# Patient Record
Sex: MALE | Race: WHITE | NOT HISPANIC OR LATINO | Employment: OTHER | URBAN - METROPOLITAN AREA
[De-identification: names, ages, dates, MRNs, and addresses within clinical notes are randomized per-mention and may not be internally consistent; named-entity substitution may affect disease eponyms.]

---

## 2018-04-24 ENCOUNTER — OFFICE VISIT (OUTPATIENT)
Dept: HEMATOLOGY | Facility: CLINIC | Age: 68
End: 2018-04-24
Attending: INTERNAL MEDICINE
Payer: COMMERCIAL

## 2018-04-24 VITALS
SYSTOLIC BLOOD PRESSURE: 133 MMHG | WEIGHT: 153.9 LBS | BODY MASS INDEX: 22.8 KG/M2 | HEART RATE: 81 BPM | RESPIRATION RATE: 12 BRPM | OXYGEN SATURATION: 96 % | HEIGHT: 69 IN | DIASTOLIC BLOOD PRESSURE: 81 MMHG | TEMPERATURE: 97.8 F

## 2018-04-24 DIAGNOSIS — Z86.718 PERSONAL HISTORY OF VENOUS THROMBOSIS AND EMBOLISM: ICD-10-CM

## 2018-04-24 DIAGNOSIS — D47.1 CHRONIC MYELOPROLIFERATIVE DISORDER (H): Primary | ICD-10-CM

## 2018-04-24 LAB
ALBUMIN SERPL-MCNC: 3.5 G/DL (ref 3.4–5)
ALP SERPL-CCNC: 145 U/L (ref 40–150)
ALT SERPL W P-5'-P-CCNC: 16 U/L (ref 0–70)
ANION GAP SERPL CALCULATED.3IONS-SCNC: 11 MMOL/L (ref 3–14)
AST SERPL W P-5'-P-CCNC: 23 U/L (ref 0–45)
BASOPHILS # BLD AUTO: 0.3 10E9/L (ref 0–0.2)
BASOPHILS NFR BLD AUTO: 0.9 %
BILIRUB SERPL-MCNC: 0.8 MG/DL (ref 0.2–1.3)
BUN SERPL-MCNC: 20 MG/DL (ref 7–30)
CALCIUM SERPL-MCNC: 8.6 MG/DL (ref 8.5–10.1)
CHLORIDE SERPL-SCNC: 105 MMOL/L (ref 94–109)
CO2 SERPL-SCNC: 22 MMOL/L (ref 20–32)
CREAT SERPL-MCNC: 1.11 MG/DL (ref 0.66–1.25)
DIFFERENTIAL METHOD BLD: ABNORMAL
EOSINOPHIL # BLD AUTO: 0.5 10E9/L (ref 0–0.7)
EOSINOPHIL NFR BLD AUTO: 1.4 %
ERYTHROCYTE [DISTWIDTH] IN BLOOD BY AUTOMATED COUNT: 19.8 % (ref 10–15)
GFR SERPL CREATININE-BSD FRML MDRD: 66 ML/MIN/1.7M2
GLUCOSE SERPL-MCNC: 83 MG/DL (ref 70–99)
HCT VFR BLD AUTO: 57.8 % (ref 40–53)
HGB BLD-MCNC: 17.9 G/DL (ref 13.3–17.7)
IMM GRANULOCYTES # BLD: 0.3 10E9/L (ref 0–0.4)
IMM GRANULOCYTES NFR BLD: 0.8 %
LYMPHOCYTES # BLD AUTO: 1.8 10E9/L (ref 0.8–5.3)
LYMPHOCYTES NFR BLD AUTO: 5.1 %
MCH RBC QN AUTO: 22.6 PG (ref 26.5–33)
MCHC RBC AUTO-ENTMCNC: 31 G/DL (ref 31.5–36.5)
MCV RBC AUTO: 73 FL (ref 78–100)
MONOCYTES # BLD AUTO: 0.6 10E9/L (ref 0–1.3)
MONOCYTES NFR BLD AUTO: 1.7 %
NEUTROPHILS # BLD AUTO: 31.4 10E9/L (ref 1.6–8.3)
NEUTROPHILS NFR BLD AUTO: 90.1 %
NRBC # BLD AUTO: 0.7 10*3/UL
NRBC BLD AUTO-RTO: 2 /100
PLATELET # BLD AUTO: 704 10E9/L (ref 150–450)
POTASSIUM SERPL-SCNC: 5 MMOL/L (ref 3.4–5.3)
PROT SERPL-MCNC: 8.5 G/DL (ref 6.8–8.8)
RBC # BLD AUTO: 7.91 10E12/L (ref 4.4–5.9)
SODIUM SERPL-SCNC: 138 MMOL/L (ref 133–144)
WBC # BLD AUTO: 34.9 10E9/L (ref 4–11)

## 2018-04-24 PROCEDURE — 85025 COMPLETE CBC W/AUTO DIFF WBC: CPT | Performed by: INTERNAL MEDICINE

## 2018-04-24 PROCEDURE — 99203 OFFICE O/P NEW LOW 30 MIN: CPT | Performed by: INTERNAL MEDICINE

## 2018-04-24 PROCEDURE — 80053 COMPREHEN METABOLIC PANEL: CPT | Performed by: INTERNAL MEDICINE

## 2018-04-24 PROCEDURE — G0463 HOSPITAL OUTPT CLINIC VISIT: HCPCS

## 2018-04-24 ASSESSMENT — PAIN SCALES - GENERAL: PAINLEVEL: NO PAIN (0)

## 2018-04-24 NOTE — MR AVS SNAPSHOT
After Visit Summary   4/24/2018    Jose Luis Parker    MRN: [id number]           Patient Information     Date Of Birth          1950        Visit Information        Provider Department      4/24/2018 1:30 PM Kwan Esposito MD [facility]        Today's Diagnoses     Chronic myeloproliferative disorder (H)    -  1    Personal history of venous thrombosis and embolism          Care Instructions    1. Continue anticoagulation with Eliquis.  2. Return to clinic in one month.                            [facility] [phone]    What is Apixaban (Eliquis  )? Eliquis   is a prescription medicine used to treat deep vein thrombosis (DVT) and pulmonary embolism, and to help reduce the risk of these conditions reoccurring.  It is also used to prevent clotting after knee & hip replacement.  It is an oral factor Xa inhibitor (it stops one of the clotting proteins). Your diet (or Vitamin K intake) does not affect this medication. This drug was FDA approved to treat DVT in 2014, but has been used in [place] since 2012 for atrial fibrillation.    How is Eliquis  usually dosed? For treatment for a new clot, Eliquis   is dosed 10mg twice daily for 7 days.  After that you take 5 mg twice daily. The dosing to prevent clots in your veins after surgery, long flights, or other high risk times: 2.5 mg twice daily  How do I know if I am taking the right dose? Everyone takes the same dose, no matter their weight.  It was not studied in obese patients.  Currently there is no lab test to check the Eliquis   blood level.  However, such testing is being developed and will likely be available in the future.  What are the possible side effects of Eliquis  ? The most common side effect of Eliquis   is bleeding (i.e. bleeding from gums, nosebleeds, heavier than normal menstrual bleeding, blood in urine or stool). This risk of bleeding is similar to other oral  blood thinners.    Get emergency medical help if you have any of these signs of allergic reaction: hives; difficulty breathing; swelling of your face, lips, tongue, throat; or if you should experience any significant bleeding.   Other side effects include: headaches, feeling dizzy or weak  What if I have surgery or a procedure scheduled? Please call your doctor about holding this medication prior to surgeries, injections into your joints or spine, or other invasive procedures (i.e. endoscopy/colonoscopy if biopsy or polyp removal needed).  Please notify your dentist that you are taking a blood thinner, although certain procedures may not require holding your Eliquis    Should I be worried that there is no reversal agent? There is no reversal agent for Eliquis   (i.e. like plasma or Vitamin K for Coumadin) However, it wears off fairly quickly. If emergent surgery is required or life-threatening bleeding should occur, blood products and medications that promote clotting may be given. Please get examined for potential bleeding after any trauma or head injury.  What should I tell my doctor before starting Eliquis  ? Please tell your doctor about past bleeding problems, liver or kidney problems, if you are pregnant or breastfeeding, or taking the following medications: mifepristone, certain antidepressants (including SSRIs such as fluoxetine, SNRIs such as venlafaxine), certain azole antifungals (such as itraconazole, ketoconazole, posaconazole), clarithromycin, conivaptan, HIV protease inhibitors (such as lopinavir, ritonavir), rifamycins (such as rifabutin), Lazara's wort, or drugs used to treat seizures (such as carbamazepine, phenytoin).            Follow-ups after your visit        Follow-up notes from your care team     Return in about 4 weeks (around 5/22/2018).      Who to contact     If you have questions or need follow up information about today's clinic visit or your schedule please contact [facility] directly at [phone].  Normal or non-critical lab and imaging results will be communicated to you by MyChart, letter or phone within 4 business days after the clinic has received the results. If you do not hear from us within 7 days, please contact the clinic through [facility] or phone. If you have a critical or abnormal lab result, we will notify you by phone as soon as possible.  Submit refill requests through [employer] or call your pharmacy and they will forward the refill request to us. Please allow 3 business days for your refill to be completed.          Additional Information About Your Visit        [facility] Information     [employer] lets you send messages to your doctor, view your test results, renew your prescriptions, schedule appointments and more. To sign up, go to [email or web address] . Click on \"Log in\" on the left side of the screen, which will take you to the Welcome page. Then click on \"Sign up Now\" on the right side of the page.     You will be asked to enter the access code listed below, as well as some personal information. Please follow the directions to create your username and password.     Your access code is: [email or web address]  Expires: 2018  3:27 PM     Your access code will  in 90 days. If you need help or a new code, please call your [facility] clinic or [phone].        Care EveryWhere ID     This is your Care EveryWhere ID. This could be used by other organizations to access your [facility] medical records  [id number]        Your Vitals Were     Pulse Temperature Respirations Height Pulse Oximetry BMI (Body Mass Index)    81 97.8  F (36.6  C) (Oral) 12 1.745 m (5' 8.7\") 96% 22.93 kg/m2       Blood Pressure from Last 3 Encounters:   18 133/81    Weight from Last 3 Encounters:   18 69.8 kg (153 lb 14.4 oz)               Primary Care Provider Office Phone # Fax #    Vivek Tamia [phone]       [facility] [address] [facility] [place] [address]        Equal Access to Services     PHYLLISANUPAMA REA : Hadii aris Hunter, wajonathan skelton, dongjessica garridomalitzy dudley, krissy ramirezmichelleliudmila proctor. So [facility] [phone].    ATENCIÓN: Si habla español, tiene a gonzales disposición servicios gratuitos de asistencia lingüística. Llame al [phone].    We comply with applicable federal civil rights laws and [place] laws. We do not discriminate on the basis of race, color, national origin, age, disability, sex, sexual orientation, or gender identity.            Thank you!     Thank you for choosing [facility]  for your care. Our goal is always to provide you with excellent care. Hearing back from our patients is one way we can continue to improve our services. Please take a few minutes to complete the written survey that you may receive in the mail after your visit with us. Thank you!             Your Updated Medication List - Protect others around you: Learn how to safely use, store and throw away your medicines at www.disposemymeds.org.          This list is accurate as of 4/24/18  3:29 PM.  Always use your most recent med list.                   Brand Name Dispense Instructions for use Diagnosis    ELIQUIS PO      Take 5 mg by mouth every 12 hours    Chronic myeloproliferative disorder (H), Personal history of venous thrombosis and embolism

## 2018-04-24 NOTE — PROGRESS NOTES
[facility]  Outpatient Clinic Visit    Jose Luis Parker is a 67-year-old male who is here today to establish care.  He is accompanied by his wife.  Coincidentally, I previously cared for her father at the [facility] many years ago.      Jose Luis presented in late 12/2017 with a left lower extremity deep vein thrombosis and bilateral pulmonary embolism.  This was diagnosed about a week after he arrived in [place] for the winter.  They did drive there so initially the thought was that this clot was provoked by a long travel.  However, upon further investigation, he was noted to have elevated blood counts (white blood cell count, hemoglobin and platelets were all elevated) along with mild hepatosplenomegaly.  This led to further investigation including a bone marrow biopsy, which was diagnostic of a chronic myeloproliferative neoplasm.  He is JAK2 positive.  No other mutations were identified.  Cytogenetics and flow cytometry were normal.      He was started on anticoagulation with apixaban and also placed on hydroxyurea.  Because they only spend the winter months in [place], they really did not have established medical care there.  There seemed to be some confusion and inconsistency with regard to the recommendations they were receiving.  Over the first few weeks, he struggled with intermittent episodes of pleuritic chest pain which he thought might be a side effect of hydroxyurea and so he eventually stopped taking it.  He has been off that now since sometime in late February (approximately 2 months ago).  Overall, he feels well.  He is tolerating the apixaban without any difficulty.  He feels he has returned to his full functional baseline.  He has no residual leg swelling or pain and no chest pain or shortness of breath.  He remains physically quite active.  He is having no bleeding issues.      They had blood counts checked in [place] about a month ago before they traveled  back to [place].  His white blood cell count was reportedly 22,000 with a platelet count of 417,000.  We do not know the hemoglobin.  He had been off hydroxyurea for about a month when these counts were obtained.      Otherwise, he has been quite healthy and has no chronic medical problems and previously had not been taking any medications.      PHYSICAL EXAMINATION:  He looks fit and in good overall health.  A detailed exam was not performed today due to time constraints.      LABORATORY DATA:  Pending at time of this dictation.        ASSESSMENT AND PLAN:   1.  Chronic myeloproliferative disorder.   2.  Left lower extremity deep vein thrombosis and bilateral pulmonary embolism in 12/2017.      I spent a total of 60 minutes today with Jose Luis and Tom, all of which was in counseling and coordination of care.  He presented with an episode of venous thromboembolism which was initially felt to be provoked by prolonged car travel.  However, in retrospect, he had a previously undiagnosed chronic myeloproliferative disorder which I explained is actually a much stronger risk factor for venous thrombosis than the long car trip.  We do not know how long he has had this chronic myeloproliferative disorder given that we do not have baseline labs.      I explained the natural history of chronic myeloproliferative disorders and the association with risk for thrombosis.  Given that he has this diagnosis and it is not curative (short of stem cell transplant), it will be imperative that he remain on anticoagulation long term.  In addition, I also explained the importance of making sure his blood counts are controlled.  I described use of Hydrea in this regard and tried my best to reassure him that it is almost always a very well tolerated and safe medication.  I do not believe the perceived side effects he was having were from the Hydrea but rather from evolution of his pulmonary emboli.  Again, all of that seems to have resolved  over the last few weeks.      Our plan is to obtain baseline labs today.  If his CBC is stable, we do not need to start Hydrea.  Either way, we will plan to see him back in 4 weeks for repeat labs and clinical evaluation.  In the meantime, he should remain on apixaban.  We did explain the importance of taking this at strict 12-hour dosing intervals.       Kwan Esposito MD  Associate Professor of Medicine  Division of Hematology, Oncology, and Transplantation  Director, [facility]

## 2018-04-24 NOTE — PATIENT INSTRUCTIONS
1. Continue anticoagulation with Eliquis.  2. Return to clinic in one month.                            [facility] [phone]    What is Apixaban (Eliquis  )? Eliquis   is a prescription medicine used to treat deep vein thrombosis (DVT) and pulmonary embolism, and to help reduce the risk of these conditions reoccurring.  It is also used to prevent clotting after knee & hip replacement.  It is an oral factor Xa inhibitor (it stops one of the clotting proteins). Your diet (or Vitamin K intake) does not affect this medication. This drug was FDA approved to treat DVT in 2014, but has been used in [place] since 2012 for atrial fibrillation.    How is Eliquis  usually dosed? For treatment for a new clot, Eliquis   is dosed 10mg twice daily for 7 days.  After that you take 5 mg twice daily. The dosing to prevent clots in your veins after surgery, long flights, or other high risk times: 2.5 mg twice daily  How do I know if I am taking the right dose? Everyone takes the same dose, no matter their weight.  It was not studied in obese patients.  Currently there is no lab test to check the Eliquis   blood level.  However, such testing is being developed and will likely be available in the future.  What are the possible side effects of Eliquis  ? The most common side effect of Eliquis   is bleeding (i.e. bleeding from gums, nosebleeds, heavier than normal menstrual bleeding, blood in urine or stool). This risk of bleeding is similar to other oral blood thinners.    Get emergency medical help if you have any of these signs of allergic reaction: hives; difficulty breathing; swelling of your face, lips, tongue, throat; or if you should experience any significant bleeding.   Other side effects include: headaches, feeling dizzy or weak  What if I have surgery or a procedure scheduled? Please call your doctor about holding this medication prior to surgeries, injections into your joints or spine, or other  invasive procedures (i.e. endoscopy/colonoscopy if biopsy or polyp removal needed).  Please notify your dentist that you are taking a blood thinner, although certain procedures may not require holding your Eliquis    Should I be worried that there is no reversal agent? There is no reversal agent for Eliquis   (i.e. like plasma or Vitamin K for Coumadin) However, it wears off fairly quickly. If emergent surgery is required or life-threatening bleeding should occur, blood products and medications that promote clotting may be given. Please get examined for potential bleeding after any trauma or head injury.  What should I tell my doctor before starting Eliquis  ? Please tell your doctor about past bleeding problems, liver or kidney problems, if you are pregnant or breastfeeding, or taking the following medications: mifepristone, certain antidepressants (including SSRIs such as fluoxetine, SNRIs such as venlafaxine), certain azole antifungals (such as itraconazole, ketoconazole, posaconazole), clarithromycin, conivaptan, HIV protease inhibitors (such as lopinavir, ritonavir), rifamycins (such as rifabutin), Kirkpatrick's wort, or drugs used to treat seizures (such as carbamazepine, phenytoin).

## 2018-04-25 ENCOUNTER — TELEPHONE (OUTPATIENT)
Dept: HEMATOLOGY | Facility: CLINIC | Age: 68
End: 2018-04-25

## 2018-04-25 DIAGNOSIS — D47.1 CHRONIC MYELOPROLIFERATIVE DISEASE (H): Primary | ICD-10-CM

## 2018-04-25 RX ORDER — HYDROXYUREA 500 MG/1
500 CAPSULE ORAL 2 TIMES DAILY
Status: DISCONTINUED | OUTPATIENT
Start: 2018-04-25 | End: 2018-07-19

## 2018-04-25 NOTE — TELEPHONE ENCOUNTER
After talking to Dr. Esposito about Mr. Torres's laboratory studies from yesterday (see below) I called him and requested that he begin hydroxyurea 500 mg BID.  He was in agreement with that plan and a prescription was sent to his pharmacy. We will check another CBC next Thursday   Results for JOHNATHAN TORRES (MRN [id number]) as of 4/25/2018 13:54   Ref. Range 4/24/2018 15:37   WBC Latest Ref Range: 4.0 - 11.0 10e9/L 34.9 (H)   Hemoglobin Latest Ref Range: 13.3 - 17.7 g/dL 17.9 (H)   Hematocrit Latest Ref Range: 40.0 - 53.0 % 57.8 (H)   Platelet Count Latest Ref Range: 150 - 450 10e9/L 704 (H)   RBC Count Latest Ref Range: 4.4 - 5.9 10e12/L 7.91 (H)   MCV Latest Ref Range: 78 - 100 fl 73 (L)   MCH Latest Ref Range: 26.5 - 33.0 pg 22.6 (L)   MCHC Latest Ref Range: 31.5 - 36.5 g/dL 31.0 (L)   RDW Latest Ref Range: 10.0 - 15.0 % 19.8 (H)   Diff Method Unknown Automated Method   % Neutrophils Latest Units: % 90.1   % Lymphocytes Latest Units: % 5.1   % Monocytes Latest Units: % 1.7   % Eosinophils Latest Units: % 1.4   % Basophils Latest Units: % 0.9   % Immature Granulocytes Latest Units: % 0.8   Nucleated RBCs Latest Ref Range: 0 /100 2 (H)   Absolute Neutrophil Latest Ref Range: 1.6 - 8.3 10e9/L 31.4 (H)   Absolute Lymphocytes Latest Ref Range: 0.8 - 5.3 10e9/L 1.8   Absolute Monocytes Latest Ref Range: 0.0 - 1.3 10e9/L 0.6   Absolute Eosinophils Latest Ref Range: 0.0 - 0.7 10e9/L 0.5   Absolute Basophils Latest Ref Range: 0.0 - 0.2 10e9/L 0.3 (H)   Abs Immature Granulocytes Latest Ref Range: 0 - 0.4 10e9/L 0.3   Results for JOHNATHAN TORRES (MRN [id number]) as of 4/25/2018 13:54   Ref. Range 4/24/2018 15:37   Sodium Latest Ref Range: 133 - 144 mmol/L 138   Potassium Latest Ref Range: 3.4 - 5.3 mmol/L 5.0   Chloride Latest Ref Range: 94 - 109 mmol/L 105   Carbon Dioxide Latest Ref Range: 20 - 32 mmol/L 22   Urea Nitrogen Latest Ref Range: 7 - 30 mg/dL 20   Creatinine Latest Ref Range: 0.66 - 1.25 mg/dL  1.11   GFR Estimate Latest Ref Range: >60 mL/min/1.7m2 66   GFR Estimate If Black Latest Ref Range: >60 mL/min/1.7m2 80   Calcium Latest Ref Range: 8.5 - 10.1 mg/dL 8.6   Anion Gap Latest Ref Range: 3 - 14 mmol/L 11   Albumin Latest Ref Range: 3.4 - 5.0 g/dL 3.5   Protein Total Latest Ref Range: 6.8 - 8.8 g/dL 8.5   Bilirubin Total Latest Ref Range: 0.2 - 1.3 mg/dL 0.8   Alkaline Phosphatase Latest Ref Range: 40 - 150 U/L 145   ALT Latest Ref Range: 0 - 70 U/L 16   AST Latest Ref Range: 0 - 45 U/L 23

## 2018-04-25 NOTE — NURSING NOTE
Patient with chronic myeloproliferative disorder and history of left LE DVT and pulmonary embolism (12/2017) here with his wife.  After discussion with Dr. Esposito it was decided that he would continue anticoagulation with abixaban and obtain current laboratory studies to evaluate the status of his myeloproliferative disorder.  VTE risk factors, signs, and symptoms discussed.  Abixaban reviewed.  Relationship between VTE and chronic myeloproliferative disorders discussed by Dr. Esposito.  Patient instructions reviewed.  Patient has contact information for the Center and was encouraged to call with questions or concerns.

## 2018-04-30 ENCOUNTER — TELEPHONE (OUTPATIENT)
Dept: HEMATOLOGY | Facility: CLINIC | Age: 68
End: 2018-04-30

## 2018-05-01 NOTE — TELEPHONE ENCOUNTER
During a dental check up Mr. Parker's dentist saw some possible oral thrush and prescribed a course of Nystatin. I explained that there was no direct relationship between his recent diagnosis of chronic myeloproliferative disorder and the possible thrush. I recommended that he use the nystatin as directed. He agreed to this plan.

## 2018-05-04 DIAGNOSIS — D47.1 CHRONIC MYELOPROLIFERATIVE DISORDER (H): ICD-10-CM

## 2018-05-04 DIAGNOSIS — D47.1 CHRONIC MYELOPROLIFERATIVE DISORDER (H): Primary | ICD-10-CM

## 2018-05-04 LAB
BASOPHILS # BLD AUTO: 0.2 10E9/L (ref 0–0.2)
BASOPHILS NFR BLD AUTO: 1 %
DIFFERENTIAL METHOD BLD: ABNORMAL
EOSINOPHIL # BLD AUTO: 0.4 10E9/L (ref 0–0.7)
EOSINOPHIL NFR BLD AUTO: 2 %
ERYTHROCYTE [DISTWIDTH] IN BLOOD BY AUTOMATED COUNT: 23.3 % (ref 10–15)
HCT VFR BLD AUTO: 55.2 % (ref 40–53)
HGB BLD-MCNC: 17.4 G/DL (ref 13.3–17.7)
LYMPHOCYTES # BLD AUTO: 1.7 10E9/L (ref 0.8–5.3)
LYMPHOCYTES NFR BLD AUTO: 9 %
MCH RBC QN AUTO: 22.6 PG (ref 26.5–33)
MCHC RBC AUTO-ENTMCNC: 31.5 G/DL (ref 31.5–36.5)
MCV RBC AUTO: 72 FL (ref 78–100)
MONOCYTES # BLD AUTO: 0.2 10E9/L (ref 0–1.3)
MONOCYTES NFR BLD AUTO: 1 %
NEUTROPHILS # BLD AUTO: 16.2 10E9/L (ref 1.6–8.3)
NEUTROPHILS NFR BLD AUTO: 87 %
PLATELET # BLD AUTO: 500 10E9/L (ref 150–450)
PLATELET # BLD EST: ABNORMAL 10*3/UL
POIKILOCYTOSIS BLD QL SMEAR: SLIGHT
RBC # BLD AUTO: 7.71 10E12/L (ref 4.4–5.9)
STOMATOCYTES BLD QL SMEAR: SLIGHT
VARIANT LYMPHS BLD QL SMEAR: PRESENT
WBC # BLD AUTO: 18.7 10E9/L (ref 4–11)

## 2018-05-04 PROCEDURE — 36415 COLL VENOUS BLD VENIPUNCTURE: CPT | Performed by: INTERNAL MEDICINE

## 2018-05-04 PROCEDURE — 85025 COMPLETE CBC W/AUTO DIFF WBC: CPT | Performed by: INTERNAL MEDICINE

## 2018-05-08 ENCOUNTER — TELEPHONE (OUTPATIENT)
Dept: HEMATOLOGY | Facility: CLINIC | Age: 68
End: 2018-05-08

## 2018-05-08 DIAGNOSIS — D47.1 CHRONIC MYELOPROLIFERATIVE DISORDER (H): Primary | ICD-10-CM

## 2018-05-08 NOTE — TELEPHONE ENCOUNTER
After speaking to Dr. Esposito I called Mr. Torres to review his CBC with differential which demonstrated improvement since he initiated the hydroxyurea (500 mg two times a day) on 4/25/2018.   Results for JOHNATHAN TORRES (MRN [id number]) as of 5/8/2018 05:54   Ref. Range 4/24/2018 15:37 5/4/2018 08:57   WBC Latest Ref Range: 4.0 - 11.0 10e9/L 34.9 (H) 18.7 (H)   Hemoglobin Latest Ref Range: 13.3 - 17.7 g/dL 17.9 (H) 17.4   Hematocrit Latest Ref Range: 40.0 - 53.0 % 57.8 (H) 55.2 (H)   Platelet Count Latest Ref Range: 150 - 450 10e9/L 704 (H) 500 (H)   RBC Count Latest Ref Range: 4.4 - 5.9 10e12/L 7.91 (H) 7.71 (H)   MCV Latest Ref Range: 78 - 100 fl 73 (L) 72 (L)   MCH Latest Ref Range: 26.5 - 33.0 pg 22.6 (L) 22.6 (L)   MCHC Latest Ref Range: 31.5 - 36.5 g/dL 31.0 (L) 31.5   RDW Latest Ref Range: 10.0 - 15.0 % 19.8 (H) 23.3 (H)   Diff Method Unknown Automated Method Automated Method   % Neutrophils Latest Units: % 90.1 87.0   % Lymphocytes Latest Units: % 5.1 9.0   % Monocytes Latest Units: % 1.7 1.0   % Eosinophils Latest Units: % 1.4 2.0   % Basophils Latest Units: % 0.9 1.0   % Immature Granulocytes Latest Units: % 0.8    Nucleated RBCs Latest Ref Range: 0 /100 2 (H)    Absolute Neutrophil Latest Ref Range: 1.6 - 8.3 10e9/L 31.4 (H) 16.2 (H)   Absolute Lymphocytes Latest Ref Range: 0.8 - 5.3 10e9/L 1.8 1.7   Absolute Monocytes Latest Ref Range: 0.0 - 1.3 10e9/L 0.6 0.2   Absolute Eosinophils Latest Ref Range: 0.0 - 0.7 10e9/L 0.5 0.4   Absolute Basophils Latest Ref Range: 0.0 - 0.2 10e9/L 0.3 (H) 0.2   Abs Immature Granulocytes Latest Ref Range: 0 - 0.4 10e9/L 0.3    Absolute Nucleated RBC Unknown 0.7    Poikilocytosis Unknown  Slight   Stomatocytes Unknown  Slight   Reactive Lymphs Unknown  Present   Platelet Estimate Unknown  Automated count c...     The plan is to continue the hydroxyurea at this dose and to check the CBC in two weeks.

## 2018-05-18 ENCOUNTER — TELEPHONE (OUTPATIENT)
Dept: HEMATOLOGY | Facility: CLINIC | Age: 68
End: 2018-05-18

## 2018-05-18 NOTE — TELEPHONE ENCOUNTER
Jose Luis Parker has an appt on 5/21 and needing lab work ahead of time.  I did reach out to [facility] to verify that  patient could get labs drawn there with results available for visit with Dr. Esposito.  He will plan to go there early on Monday to get his labs drawn. Confirmed appt time with patient and he plans to attend.  Trisha Marroquin, RN - Nurse Clinician - [facility] - [phone]

## 2018-05-21 ENCOUNTER — OFFICE VISIT (OUTPATIENT)
Dept: HEMATOLOGY | Facility: CLINIC | Age: 68
End: 2018-05-21
Attending: INTERNAL MEDICINE
Payer: COMMERCIAL

## 2018-05-21 VITALS
HEIGHT: 69 IN | SYSTOLIC BLOOD PRESSURE: 136 MMHG | DIASTOLIC BLOOD PRESSURE: 77 MMHG | OXYGEN SATURATION: 98 % | TEMPERATURE: 97.4 F | BODY MASS INDEX: 23.37 KG/M2 | RESPIRATION RATE: 12 BRPM | HEART RATE: 76 BPM | WEIGHT: 157.8 LBS

## 2018-05-21 DIAGNOSIS — D47.1 CHRONIC MYELOPROLIFERATIVE DISORDER (H): Primary | ICD-10-CM

## 2018-05-21 DIAGNOSIS — D47.1 CHRONIC MYELOPROLIFERATIVE DISORDER (H): ICD-10-CM

## 2018-05-21 DIAGNOSIS — Z86.718 PERSONAL HISTORY OF VENOUS THROMBOSIS AND EMBOLISM: ICD-10-CM

## 2018-05-21 LAB
ANISOCYTOSIS BLD QL SMEAR: SLIGHT
BASOPHILS # BLD AUTO: 0.3 10E9/L (ref 0–0.2)
BASOPHILS NFR BLD AUTO: 2 %
DIFFERENTIAL METHOD BLD: ABNORMAL
EOSINOPHIL # BLD AUTO: 0.3 10E9/L (ref 0–0.7)
EOSINOPHIL NFR BLD AUTO: 2 %
ERYTHROCYTE [DISTWIDTH] IN BLOOD BY AUTOMATED COUNT: 28.3 % (ref 10–15)
HCT VFR BLD AUTO: 54.7 % (ref 40–53)
HGB BLD-MCNC: 17.4 G/DL (ref 13.3–17.7)
LYMPHOCYTES # BLD AUTO: 1.3 10E9/L (ref 0.8–5.3)
LYMPHOCYTES NFR BLD AUTO: 9 %
MACROCYTES BLD QL SMEAR: PRESENT
MCH RBC QN AUTO: 24.6 PG (ref 26.5–33)
MCHC RBC AUTO-ENTMCNC: 31.8 G/DL (ref 31.5–36.5)
MCV RBC AUTO: 77 FL (ref 78–100)
MONOCYTES # BLD AUTO: 0.3 10E9/L (ref 0–1.3)
MONOCYTES NFR BLD AUTO: 2 %
NEUTROPHILS # BLD AUTO: 12.4 10E9/L (ref 1.6–8.3)
NEUTROPHILS NFR BLD AUTO: 85 %
PLATELET # BLD AUTO: 241 10E9/L (ref 150–450)
PLATELET # BLD EST: ABNORMAL 10*3/UL
RBC # BLD AUTO: 7.07 10E12/L (ref 4.4–5.9)
STOMATOCYTES BLD QL SMEAR: SLIGHT
VARIANT LYMPHS BLD QL SMEAR: PRESENT
WBC # BLD AUTO: 14.6 10E9/L (ref 4–11)

## 2018-05-21 PROCEDURE — 99215 OFFICE O/P EST HI 40 MIN: CPT | Performed by: INTERNAL MEDICINE

## 2018-05-21 PROCEDURE — G0463 HOSPITAL OUTPT CLINIC VISIT: HCPCS

## 2018-05-21 PROCEDURE — 85025 COMPLETE CBC W/AUTO DIFF WBC: CPT | Performed by: INTERNAL MEDICINE

## 2018-05-21 PROCEDURE — 36415 COLL VENOUS BLD VENIPUNCTURE: CPT | Performed by: INTERNAL MEDICINE

## 2018-05-21 RX ORDER — TAMSULOSIN HYDROCHLORIDE 0.4 MG/1
0.4 CAPSULE ORAL PRN
COMMUNITY
Start: 2016-10-14

## 2018-05-21 ASSESSMENT — PAIN SCALES - GENERAL: PAINLEVEL: NO PAIN (0)

## 2018-05-21 NOTE — NURSING NOTE
Jose Luis Parker is here for follow up visit on anticoagulation.  They are being seen history of DVT and chronic myeloproliferative disorder.  Welcomed and introduced them to our center and provided them with a contact card with our contact phone numbers.    I had helped them arrange for a CBC to be done at local clinic and I did let them know that those results were available and would be discussed with Dr. Esposito. a   Medications and allergies were reviewed and updated with outside records as available.    I thanked them for coming in and encouraged them to call with any concerns or questions.    Trisha Marroquin, RN - Nurse Clinician - [facility] - [phone]

## 2018-05-21 NOTE — PROGRESS NOTES
[facility]  Outpatient Clinic Visit    Jose Luis Parker is a 67-year-old male who returns today for followup.  He is accompanied by his wife.  As outlined in my previous note from 04/24/2018, he was diagnosed in 12/2017 with a left lower extremity deep vein thrombosis and bilateral pulmonary emboli.  These were initially felt to be provoked by prolonged car travel from [place] to [place].  However, soon thereafter, he was identified as having an underlying chronic myeloproliferative disorder (JAK2 positive).  Please see my previous note for full details.      We started Víctor on hydroxyurea 500 mg twice daily 1 month ago.  He is tolerating it without any side effects.  He also remains on apixaban, which he also is tolerating without any bleeding issues.  He continues to struggle with all of this as he was previously not on any medications.  He would very much like not to be on medications but does understand the importance of treating both of these conditions to prevent further potentially life-threatening complications.      He had questions today regarding possible environmental cause of his chronic myeloproliferative disorder.  Apparently, a neighbor was recently diagnosed with chronic myeloproliferative disorder and is also on hydroxyurea.      The remainder of his review of systems is negative.      PHYSICAL EXAMINATION:  He looks well.  His complexion is less xiomara than when I saw him a month ago.  A more detailed exam was not performed today.      LABORATORY:  CBC today shows a white count of 14.6 with 85% neutrophils, 9% lymphocytes, 2% monocytes, 2% eosinophils and 2% basophils.  Hemoglobin 17.4 with hematocrit of 54.7.  RBC count remains elevated at 7.07 with an MCV of 77.  His platelet count is 241,000.      We reviewed his CBC trends over the last month.  His white count and platelet count have decreased nicely since starting hydroxyurea.  His hemoglobin has decreased only  slightly, but overall things are all trending in the right direction.        ASSESSMENT AND PLAN:   1.  Chronic myeloproliferative disorder (JAK2 positive).   2.  History of left lower extremity deep vein thrombosis and bilateral pulmonary emboli 12/2017.      I spent a total of 40 minutes today with Víctor and Tom, all of which was counseling and coordination of care.  He is doing well on hydroxyurea and his blood counts are responding as expected.  We are going to keep him on the same dose of 500 mg twice daily for now given that his platelet count is continuing to trend down.  Hopefully, things will level off soon.  I reminded him that he is on a Hydrea dose that is lower than what we typically need to control this condition.  I think it is unlikely he will be able to decrease the dose further, but we will see as time goes on in terms of how his blood counts respond.  We did revisit the importance of controlling his blood counts to prevent the complications of chronic myeloproliferative disorders including both arterial and venous thrombosis.      He is doing well on apixaban and we will make no change in that today.  He did ask about decreasing the dose of the medication, again from the perspective of wanting to be on as little medication as possible.  I explained to him that he is on an appropriate dose for his current indication.  We did review the data regarding long-term secondary prevention with prophylactic doses of the direct oral anticoagulant drugs.  He would not have qualified for any of those studies given the underlying chronic myeloproliferative disorder which does represent an ongoing risk factor for thrombosis.  Thus, my recommendation is to remain on full-intensity anticoagulation unless he has other reasons to consider reducing the dose such as increased nuisance bleeding, with which he currently is not having any difficulty.      Thus, we will continue the same dose of hydroxyurea and apixaban.   He should have a CBC checked again in 2 weeks and we will contact him with that result.  Our plan is to see him back in 3 months for repeat clinical evaluation.  In the meantime, we will continue to monitor his blood counts, hopefully with increasing intervals between lab checks but depending on how the numbers look.  They were encouraged to call with any new questions or concerns in the meantime.       Kwan Esposito MD  Associate Professor of Medicine  Division of Hematology, Oncology, and Transplantation  Director, [facility]

## 2018-06-11 ENCOUNTER — DOCUMENTATION ONLY (OUTPATIENT)
Dept: LAB | Facility: CLINIC | Age: 68
End: 2018-06-11

## 2018-06-11 DIAGNOSIS — D47.1 CHRONIC MYELOPROLIFERATIVE DISORDER (H): Primary | ICD-10-CM

## 2018-06-11 NOTE — PROGRESS NOTES
..Please place or confirm orders for upcoming lab appointment on 06/12/2018    Thanks  Indira Cole

## 2018-06-12 DIAGNOSIS — D47.1 CHRONIC MYELOPROLIFERATIVE DISORDER (H): ICD-10-CM

## 2018-06-12 LAB
ANISOCYTOSIS BLD QL SMEAR: ABNORMAL
BASOPHILS # BLD AUTO: 0.1 10E9/L (ref 0–0.2)
BASOPHILS NFR BLD AUTO: 1 %
DIFFERENTIAL METHOD BLD: ABNORMAL
EOSINOPHIL # BLD AUTO: 0.1 10E9/L (ref 0–0.7)
EOSINOPHIL NFR BLD AUTO: 1 %
ERYTHROCYTE [DISTWIDTH] IN BLOOD BY AUTOMATED COUNT: ABNORMAL % (ref 10–15)
HCT VFR BLD AUTO: 50.8 % (ref 40–53)
HGB BLD-MCNC: 16.5 G/DL (ref 13.3–17.7)
LYMPHOCYTES # BLD AUTO: 1.6 10E9/L (ref 0.8–5.3)
LYMPHOCYTES NFR BLD AUTO: 12 %
MACROCYTES BLD QL SMEAR: PRESENT
MCH RBC QN AUTO: 27.2 PG (ref 26.5–33)
MCHC RBC AUTO-ENTMCNC: 32.5 G/DL (ref 31.5–36.5)
MCV RBC AUTO: 84 FL (ref 78–100)
MICROCYTES BLD QL SMEAR: PRESENT
MONOCYTES # BLD AUTO: 0.3 10E9/L (ref 0–1.3)
MONOCYTES NFR BLD AUTO: 2 %
NEUTROPHILS # BLD AUTO: 11 10E9/L (ref 1.6–8.3)
NEUTROPHILS NFR BLD AUTO: 84 %
PLATELET # BLD AUTO: 183 10E9/L (ref 150–450)
PLATELET # BLD EST: ABNORMAL 10*3/UL
RBC # BLD AUTO: 6.07 10E12/L (ref 4.4–5.9)
WBC # BLD AUTO: 13.1 10E9/L (ref 4–11)

## 2018-06-12 PROCEDURE — 36415 COLL VENOUS BLD VENIPUNCTURE: CPT | Performed by: INTERNAL MEDICINE

## 2018-06-12 PROCEDURE — 85025 COMPLETE CBC W/AUTO DIFF WBC: CPT | Performed by: INTERNAL MEDICINE

## 2018-07-10 ENCOUNTER — TELEPHONE (OUTPATIENT)
Dept: HEMATOLOGY | Facility: CLINIC | Age: 68
End: 2018-07-10

## 2018-07-12 NOTE — TELEPHONE ENCOUNTER
Phone call from Jose Luis Parker and his wife (on speaker phone) to report that he has gotten stung twice on his left hand by wasps on July 10.  Mr. Parker is on Eliquis and Hydroxyurea and was wondering if he should be specifically concerned.    He said that they have been applying cold to the area for pain and itch relief.  The swelling was more pronounced the day of the stings and the hand is improving since then.  I did tell them that there is nothing specific about being on those medications that should be of concern.  I did review signs of localized infection/cewllulitis to watch for and recommended that they see their primary care with new symptoms.    They appreciated the advice and will call with future concerns.    Trisha Marroquin, RN - Nurse Clinician - [facility] - [phone]

## 2018-07-19 DIAGNOSIS — D47.1 CHRONIC MYELOPROLIFERATIVE DISEASE (H): Primary | ICD-10-CM

## 2018-07-19 DIAGNOSIS — Z15.89 JAK2 GENE MUTATION: ICD-10-CM

## 2018-07-19 RX ORDER — HYDROXYUREA 500 MG/1
1000 CAPSULE ORAL DAILY
Qty: 60 CAPSULE | Refills: 2 | Status: SHIPPED | OUTPATIENT
Start: 2018-07-19 | End: 2018-10-30

## 2018-08-14 DIAGNOSIS — D47.1 CHRONIC MYELOPROLIFERATIVE DISORDER (H): ICD-10-CM

## 2018-08-14 DIAGNOSIS — Z86.718 PERSONAL HISTORY OF VENOUS THROMBOSIS AND EMBOLISM: ICD-10-CM

## 2018-08-14 NOTE — TELEPHONE ENCOUNTER
Jose Luis Parker has a history of LE DVT and PE and diagnosed with underlying myeloproliferative disorder.  He is on Hydroxy Urea and Eliquis.  He is calling today for a refill on his Eliquis.  He was last seen in clinic on 05/21/18. The prescription for Eliquis was placed and sent electronically to his [facility] pharmacy.  He is on the schedule for a follow up visit with Dr. Esposito on 9/4/18.  I left Víctor a voicemail message telling him that the Rx had been placed and reminded him about his clinic visit in September. Trisha Marroquin, RN - Nurse Clinician - [facility] - [phone]

## 2018-09-02 DIAGNOSIS — D47.1 CHRONIC MYELOPROLIFERATIVE DISORDER (H): ICD-10-CM

## 2018-09-02 LAB
BASOPHILS # BLD AUTO: 0.2 10E9/L (ref 0–0.2)
BASOPHILS NFR BLD AUTO: 1.2 %
DIFFERENTIAL METHOD BLD: ABNORMAL
EOSINOPHIL # BLD AUTO: 0.2 10E9/L (ref 0–0.7)
EOSINOPHIL NFR BLD AUTO: 1.5 %
ERYTHROCYTE [DISTWIDTH] IN BLOOD BY AUTOMATED COUNT: 15.1 % (ref 10–15)
HCT VFR BLD AUTO: 51 % (ref 40–53)
HGB BLD-MCNC: 16.8 G/DL (ref 13.3–17.7)
LYMPHOCYTES # BLD AUTO: 1.9 10E9/L (ref 0.8–5.3)
LYMPHOCYTES NFR BLD AUTO: 14.3 %
MCH RBC QN AUTO: 35.4 PG (ref 26.5–33)
MCHC RBC AUTO-ENTMCNC: 32.9 G/DL (ref 31.5–36.5)
MCV RBC AUTO: 107 FL (ref 78–100)
MONOCYTES # BLD AUTO: 0.4 10E9/L (ref 0–1.3)
MONOCYTES NFR BLD AUTO: 2.8 %
NEUTROPHILS # BLD AUTO: 10.4 10E9/L (ref 1.6–8.3)
NEUTROPHILS NFR BLD AUTO: 80.2 %
PLATELET # BLD AUTO: 233 10E9/L (ref 150–450)
RBC # BLD AUTO: 4.75 10E12/L (ref 4.4–5.9)
WBC # BLD AUTO: 13 10E9/L (ref 4–11)

## 2018-09-02 PROCEDURE — 36415 COLL VENOUS BLD VENIPUNCTURE: CPT | Performed by: INTERNAL MEDICINE

## 2018-09-02 PROCEDURE — 85025 COMPLETE CBC W/AUTO DIFF WBC: CPT | Performed by: INTERNAL MEDICINE

## 2018-09-04 ENCOUNTER — OFFICE VISIT (OUTPATIENT)
Dept: HEMATOLOGY | Facility: CLINIC | Age: 68
End: 2018-09-04
Attending: INTERNAL MEDICINE
Payer: COMMERCIAL

## 2018-09-04 VITALS
HEART RATE: 75 BPM | HEIGHT: 69 IN | DIASTOLIC BLOOD PRESSURE: 72 MMHG | RESPIRATION RATE: 12 BRPM | BODY MASS INDEX: 22.64 KG/M2 | OXYGEN SATURATION: 98 % | TEMPERATURE: 97.9 F | SYSTOLIC BLOOD PRESSURE: 139 MMHG | WEIGHT: 152.9 LBS

## 2018-09-04 DIAGNOSIS — D47.1 CHRONIC MYELOPROLIFERATIVE DISORDER (H): ICD-10-CM

## 2018-09-04 DIAGNOSIS — Z86.718 HISTORY OF DEEP VENOUS THROMBOSIS: Primary | ICD-10-CM

## 2018-09-04 DIAGNOSIS — Z86.711 HISTORY OF PULMONARY EMBOLISM: ICD-10-CM

## 2018-09-04 PROCEDURE — 99214 OFFICE O/P EST MOD 30 MIN: CPT | Performed by: INTERNAL MEDICINE

## 2018-09-04 ASSESSMENT — PAIN SCALES - GENERAL: PAINLEVEL: NO PAIN (0)

## 2018-09-04 NOTE — PROGRESS NOTES
[facility]  [facility] [place] [address]  Phone: [phone], Fax: [phone]      Outpatient Visit Note:    Patient: Jose Luis Parker  MRN: [id number]  : 1950  HANNAH: Sep 4, 2018    Reason for Visit:  Follow up for DVT and pulmonary embolism (provoked by travel and chronic myeloproliferative disorder)    Forward History:  Dec 2017 diagnosed with provoked DVT and pulmonary embolism after travel to [place] by car.  He was noted to have leuko and erythrocytoses, and bone marrow biopsy confirmed chronic myeloproliferative disorder, Camden-2 mutated.  Started on HU and apixaban and seen to establish care in [place] in 2018    Interval History: Jose Luis Parker is a 67 year old man with a history of Jak2 positive chronic myeloproliferative disorder (NOS) and DVT plus pulmonary embolism who returns for routine follow up.  He has been doing well since he was seen last by Dr Esposito in .  He was recently stung by a hornet in the left forearm which is extremely swollen today but he says is not have the ordinary for him.  He has been tolerating the hydroxyurea without any gastrointestinal complaints or any other intolerance to the medication.  He reports no chest pain shortness of breath or hemoptysis today.  He notes no swelling in his legs or pain.  In addition he notes no symptoms to suggest stroke such as cognitive changes or focal neurologic deficits.  He and his wife do express some concerns about the underlying cause of this disease given his high exposure to pesticides in the past.    I reviewed the patient's Past Medical History, Medicines and Allergies in EPIC    Objective:  Vitals: B/P: 139/72, T: 97.9, P: 75, R: 12, Wt: 152 lbs 14.4 oz  Exam:   Gen: Appears well, no distress, jose alfredo-faced  HEENT: no scleral icterus or hemorrhage, no wet purpura, no lymphadenopathy  Ext: no edema, large edematous left arm consistent with severe allergic  reaction    Labs:  Results for JOHNATHAN PARKER (MRN [id number]) as of 9/4/2018 16:53   Ref. Range 5/21/2018 09:48 6/12/2018 10:49 9/2/2018 14:09   WBC Latest Ref Range: 4.0 - 11.0 10e9/L 14.6 (H) 13.1 (H) 13.0 (H)   Hemoglobin Latest Ref Range: 13.3 - 17.7 g/dL 17.4 16.5 16.8   Hematocrit Latest Ref Range: 40.0 - 53.0 % 54.7 (H) 50.8 51.0   Platelet Count Latest Ref Range: 150 - 450 10e9/L 241 183 233       Assessment:  In summary, Johnathan Parker is a 67 year old man with a history of:  1. DVT and pulmonary embolism: He has been treated for > 6 months with apixaban and therefore has been fully treated. Given the high risk of recurrence with stopping anticoagulation, he will remain on indefinite anticoagulation for secondary prophylaxis.    2. Chronic myeloproliferative disorder (NOS, bone marrow pathology not available to review today).  His counts are stable but not at goal as of this visit.  I explained that generally with MPNs such as P Vera (a more common Jak2 pos disease), our goal would be to decrease his hematocrit to 45 as it is known to reduce risk of death and thrombotic disease significantly when compared with a more liberal goal of > 45.  I suggested that 2 options to reach this goal would be to add phlebotomy vs increasing his HU dose.  I explained that I would favor increasing HU as he is unlikely to experience problematic leukopenia or thrombocytopenia at this point.  My experience is that phlebotomy is more likely to result in toxicity due to iron deficiency.      Plan:  1. Majority of today's visit was spent counseling the patient regarding goals of treatment of his pulmonary embolism and chronic myeloproliferative disorder.  2. I will discuss with Dr Esposito about our clinic visit today and he will follow up with them regarding any changes in management as they are reticent to make changes today.  3. Our follow up and lab monitoring will change based on any plans to change therapy so I  have not made specific follow up plans as of this visit.    The patient is given our center's contact information and is instructed to call if he should have any further questions or concerns..      Total Time Spent:  I spent a total of 25 minutes face-to-face with Jose Luis Parker during today's office visit.  Over 50% of this time was spent counseling the patient and/or coordinating care regarding DVT and pulmonary embolism and its relationship with myeloid neoplasms.      Daniel Jc MD   of Medicine  [facility] [employer]

## 2018-09-04 NOTE — MR AVS SNAPSHOT
After Visit Summary   9/4/2018    Johnathan Torres    MRN: [id number]           Patient Information     Date Of Birth          1950        Visit Information        Provider Department      9/4/2018 1:00 PM Daniel Jc MD [facility]        Care Instructions    1.  Goal Hematocrit is 45.  2.  Consider increasing Hydroxyurea or phlebotomy  3.  Dr. Jc to discuss above with Dr. Esposito.  4.  Follow up per Dr. Esposito.    Results for JOHNATHAN TORRES (MRN [id number]) as of 9/4/2018 13:42   Ref. Range 4/24/2018 15:37 5/4/2018 08:57 5/21/2018 09:48 6/12/2018 10:49 9/2/2018 14:09   WBC Latest Ref Range: 4.0 - 11.0 10e9/L 34.9 (H) 18.7 (H) 14.6 (H) 13.1 (H) 13.0 (H)   Hemoglobin Latest Ref Range: 13.3 - 17.7 g/dL 17.9 (H) 17.4 17.4 16.5 16.8   Hematocrit Latest Ref Range: 40.0 - 53.0 % 57.8 (H) 55.2 (H) 54.7 (H) 50.8 51.0   Platelet Count Latest Ref Range: 150 - 450 10e9/L 704 (H) 500 (H) 241 183 233             Follow-ups after your visit        Who to contact     If you have questions or need follow up information about today's clinic visit or your schedule please contact [facility] directly at [phone].  Normal or non-critical lab and imaging results will be communicated to you by [employer]hart, letter or phone within 4 business days after the clinic has received the results. If you do not hear from us within 7 days, please contact the clinic through [employer] or phone. If you have a critical or abnormal lab result, we will notify you by phone as soon as possible.  Submit refill requests through [employer] or call your pharmacy and they will forward the refill request to us. Please allow 3 business days for your refill to be completed.          Additional Information About Your Visit        [employer] Information     [employer] lets you send messages to your doctor, view your test results, renew your prescriptions, schedule  "appointments and more. To sign up, go to [email or web address] . Click on \"Log in\" on the left side of the screen, which will take you to the Welcome page. Then click on \"Sign up Now\" on the right side of the page.     You will be asked to enter the access code listed below, as well as some personal information. Please follow the directions to create your username and password.     Your access code is: [email or web address]  Expires: 12/3/2018 12:45 PM     Your access code will  in 90 days. If you need help or a new code, please call your [facility] clinic or [phone].        Care EveryWhere ID     This is your Care EveryWhere ID. This could be used by other organizations to access your [facility] medical records  [id number]        Your Vitals Were     Pulse Temperature Respirations Height Pulse Oximetry BMI (Body Mass Index)    75 97.9  F (36.6  C) (Oral) 12 1.753 m (5' 9\") 98% 22.58 kg/m2       Blood Pressure from Last 3 Encounters:   18 139/72   18 136/77   18 133/81    Weight from Last 3 Encounters:   18 69.4 kg (152 lb 14.4 oz)   18 71.6 kg (157 lb 12.8 oz)   18 69.8 kg (153 lb 14.4 oz)              Today, you had the following     No orders found for display       Primary Care Provider Office Phone # Fax #    Kwan Perry Esposito [phone]       [address] [place] [address]        Equal Access to Services     ANUPAMA LUCIA : pepe Carpenter, krissy rivers. So [facility] [phone].    ATENCIÓN: Si habla español, tiene a gonzales disposición servicios gratuitos de asistencia lingüística. Llame al [phone].    We comply with applicable federal civil rights laws and [place] laws. We do not discriminate on the basis of race, color, national origin, age, disability, sex, sexual orientation, or gender identity.            Thank you!     Thank you for choosing [facility]  for your care. Our goal is always to provide you with excellent care. Hearing back from our patients is one way we can continue to improve our services. Please take a few minutes to complete the written survey that you may receive in the mail after your visit with us. Thank you!             Your Updated Medication List - Protect others around you: Learn how to safely use, store and throw away your medicines at www.disposemymeds.org.          This list is accurate as of 9/4/18  1:44 PM.  Always use your most recent med list.                   Brand Name Dispense Instructions for use Diagnosis    apixaban ANTICOAGULANT 5 MG tablet    ELIQUIS    60 tablet    Take 1 tablet (5 mg) by mouth every 12 hours    Chronic myeloproliferative disorder (H), Personal history of venous thrombosis and embolism       hydroxyurea 500 MG capsule CHEMO    HYDREA    60 capsule    Take 2 capsules (1,000 mg) by mouth daily    Chronic myeloproliferative disease (H), JAK2 gene mutation       MULTIVITAMINS PO      Take 1 tablet by mouth daily        tamsulosin 0.4 MG capsule    FLOMAX     Take 0.4 mg by mouth as needed

## 2018-09-04 NOTE — PATIENT INSTRUCTIONS
1.  Goal Hematocrit is 45.  2.  Consider increasing Hydroxyurea or phlebotomy  3.  Dr. Jc to discuss above with Dr. Esposito.  4.  Follow up per Dr. Esposito.    Results for JOHNATHAN TORRES (MRN [id number]) as of 9/4/2018 13:42   Ref. Range 4/24/2018 15:37 5/4/2018 08:57 5/21/2018 09:48 6/12/2018 10:49 9/2/2018 14:09   WBC Latest Ref Range: 4.0 - 11.0 10e9/L 34.9 (H) 18.7 (H) 14.6 (H) 13.1 (H) 13.0 (H)   Hemoglobin Latest Ref Range: 13.3 - 17.7 g/dL 17.9 (H) 17.4 17.4 16.5 16.8   Hematocrit Latest Ref Range: 40.0 - 53.0 % 57.8 (H) 55.2 (H) 54.7 (H) 50.8 51.0   Platelet Count Latest Ref Range: 150 - 450 10e9/L 704 (H) 500 (H) 241 183 233

## 2018-09-20 ENCOUNTER — TELEPHONE (OUTPATIENT)
Dept: HEMATOLOGY | Facility: CLINIC | Age: 68
End: 2018-09-20

## 2018-09-21 NOTE — TELEPHONE ENCOUNTER
Mr. Parker is a 67 year old man with chronic myeloproliferative disorder (Camden-2 mutated) with history of left LE DVT and bilateral pulmonary embolism currently anticoagulated with Abixaban and on HUS. He reports that from June 20th through   August 15th he was only taking 500 mg orally daily of the Hydrea. Since August 16th he has resumed 1000 mg daily.After Dr. Esposito reviewed the most recent CBC with differential Mr. Parker was instructed to continue Hydrea and Eliquis as prescribed and return to clinic in 4 weeks.

## 2018-10-30 DIAGNOSIS — Z15.89 JAK2 GENE MUTATION: ICD-10-CM

## 2018-10-30 DIAGNOSIS — D47.1 CHRONIC MYELOPROLIFERATIVE DISEASE (H): ICD-10-CM

## 2018-10-30 RX ORDER — HYDROXYUREA 500 MG/1
CAPSULE ORAL
Qty: 60 CAPSULE | Refills: 1 | Status: SHIPPED | OUTPATIENT
Start: 2018-10-30 | End: 2024-04-30

## 2018-11-13 ENCOUNTER — OFFICE VISIT (OUTPATIENT)
Dept: HEMATOLOGY | Facility: CLINIC | Age: 68
End: 2018-11-13
Attending: INTERNAL MEDICINE
Payer: COMMERCIAL

## 2018-11-13 ENCOUNTER — OFFICE VISIT (OUTPATIENT)
Dept: HEMATOLOGY | Facility: CLINIC | Age: 68
End: 2018-11-13
Payer: COMMERCIAL

## 2018-11-13 VITALS
BODY MASS INDEX: 21.74 KG/M2 | TEMPERATURE: 97.8 F | OXYGEN SATURATION: 98 % | HEIGHT: 71 IN | DIASTOLIC BLOOD PRESSURE: 76 MMHG | SYSTOLIC BLOOD PRESSURE: 134 MMHG | WEIGHT: 155.3 LBS | HEART RATE: 75 BPM | RESPIRATION RATE: 12 BRPM

## 2018-11-13 DIAGNOSIS — D47.1 CHRONIC MYELOPROLIFERATIVE DISORDER (H): ICD-10-CM

## 2018-11-13 DIAGNOSIS — D47.1 CHRONIC MYELOPROLIFERATIVE DISEASE (H): ICD-10-CM

## 2018-11-13 DIAGNOSIS — Z86.718 PERSONAL HISTORY OF VENOUS THROMBOSIS AND EMBOLISM: ICD-10-CM

## 2018-11-13 DIAGNOSIS — Z15.89 JAK2 GENE MUTATION: ICD-10-CM

## 2018-11-13 LAB
BASOPHILS # BLD AUTO: 0.1 10E9/L (ref 0–0.2)
BASOPHILS NFR BLD AUTO: 0.6 %
DIFFERENTIAL METHOD BLD: ABNORMAL
EOSINOPHIL # BLD AUTO: 0.1 10E9/L (ref 0–0.7)
EOSINOPHIL NFR BLD AUTO: 0.6 %
ERYTHROCYTE [DISTWIDTH] IN BLOOD BY AUTOMATED COUNT: 19.5 % (ref 10–15)
HCT VFR BLD AUTO: 43.8 % (ref 40–53)
HGB BLD-MCNC: 14.7 G/DL (ref 13.3–17.7)
IMM GRANULOCYTES # BLD: 0.1 10E9/L (ref 0–0.4)
IMM GRANULOCYTES NFR BLD: 0.3 %
LYMPHOCYTES # BLD AUTO: 2 10E9/L (ref 0.8–5.3)
LYMPHOCYTES NFR BLD AUTO: 11.3 %
MCH RBC QN AUTO: 38.1 PG (ref 26.5–33)
MCHC RBC AUTO-ENTMCNC: 33.6 G/DL (ref 31.5–36.5)
MCV RBC AUTO: 114 FL (ref 78–100)
MONOCYTES # BLD AUTO: 0.7 10E9/L (ref 0–1.3)
MONOCYTES NFR BLD AUTO: 3.9 %
NEUTROPHILS # BLD AUTO: 14.6 10E9/L (ref 1.6–8.3)
NEUTROPHILS NFR BLD AUTO: 83.3 %
NRBC # BLD AUTO: 0 10*3/UL
NRBC BLD AUTO-RTO: 0 /100
PLATELET # BLD AUTO: 250 10E9/L (ref 150–450)
RBC # BLD AUTO: 3.86 10E12/L (ref 4.4–5.9)
WBC # BLD AUTO: 17.5 10E9/L (ref 4–11)

## 2018-11-13 PROCEDURE — G0463 HOSPITAL OUTPT CLINIC VISIT: HCPCS

## 2018-11-13 PROCEDURE — 99214 OFFICE O/P EST MOD 30 MIN: CPT | Performed by: INTERNAL MEDICINE

## 2018-11-13 PROCEDURE — 85025 COMPLETE CBC W/AUTO DIFF WBC: CPT | Performed by: INTERNAL MEDICINE

## 2018-11-13 RX ORDER — HYDROXYUREA 500 MG/1
500 CAPSULE ORAL 2 TIMES DAILY
Qty: 180 CAPSULE | Refills: 3 | Status: SHIPPED | OUTPATIENT
Start: 2018-11-13 | End: 2019-12-09

## 2018-11-13 ASSESSMENT — PAIN SCALES - GENERAL: PAINLEVEL: NO PAIN (0)

## 2018-11-13 NOTE — PATIENT INSTRUCTIONS
1. Continue Eliquis and Hydroxea as prescribed.  2. Check your CBC with differential in December- fax results to [phone]  3. Return to clinic this April.

## 2018-11-13 NOTE — MR AVS SNAPSHOT
After Visit Summary   11/13/2018    Jose Luis Parker    MRN: [id number]           Patient Information     Date Of Birth          1950        Visit Information        Provider Department      11/13/2018 1:00 PM Kwan Esposito MD [facility]        Today's Diagnoses     Chronic myeloproliferative disease (H)        JAK2 gene mutation        Chronic myeloproliferative disorder (H)        Personal history of venous thrombosis and embolism          Care Instructions    1. Continue Eliquis and Hydroxea as prescribed.  2. Check your CBC with differential in December- fax results to [phone]  3. Return to clinic this April.          Follow-ups after your visit        Who to contact     If you have questions or need follow up information about today's clinic visit or your schedule please contact [facility] directly at [phone].  Normal or non-critical lab and imaging results will be communicated to you by [employer], letter or phone within 4 business days after the clinic has received the results. If you do not hear from us within 7 days, please contact the clinic through [employer] or phone. If you have a critical or abnormal lab result, we will notify you by phone as soon as possible.  Submit refill requests through [employer] or call your pharmacy and they will forward the refill request to us. Please allow 3 business days for your refill to be completed.          Additional Information About Your Visit        [employer] Information     [employer] gives you secure access to your electronic health record. If you see a primary care provider, you can also send messages to your care team and make appointments. If you have questions, please call your primary care clinic.  If you do not have a primary care provider, please call [phone] and they will assist you.        Care EveryWhere ID     This is your Care EveryWhere ID. This could be  "used by other organizations to access your [facility] medical records  [id number]        Your Vitals Were     Pulse Temperature Respirations Height Pulse Oximetry BMI (Body Mass Index)    75 97.8  F (36.6  C) (Oral) 12 1.791 m (5' 10.5\") 98% 21.97 kg/m2       Blood Pressure from Last 3 Encounters:   11/13/18 134/76   09/04/18 139/72   05/21/18 136/77    Weight from Last 3 Encounters:   11/13/18 70.4 kg (155 lb 4.8 oz)   09/04/18 69.4 kg (152 lb 14.4 oz)   05/21/18 71.6 kg (157 lb 12.8 oz)              Today, you had the following     No orders found for display         Today's Medication Changes          These changes are accurate as of 11/13/18  1:44 PM.  If you have any questions, ask your nurse or doctor.               These medicines have changed or have updated prescriptions.        Dose/Directions    * hydroxyurea 500 MG capsule CHEMO   Commonly known as:  HYDREA   This may have changed:  Another medication with the same name was added. Make sure you understand how and when to take each.   Used for:  Chronic myeloproliferative disease (H), JAK2 gene mutation   Changed by:  Kwan Esposito MD        TAKE 2 CAPSULES BY MOUTH ONCE DAILY   Quantity:  60 capsule   Refills:  1       * hydroxyurea 500 MG capsule CHEMO   Commonly known as:  HYDREA   This may have changed:  You were already taking a medication with the same name, and this prescription was added. Make sure you understand how and when to take each.   Used for:  Chronic myeloproliferative disease (H)   Changed by:  Kwan Esposito MD        Dose:  500 mg   Take 1 capsule (500 mg) by mouth 2 times daily   Quantity:  180 capsule   Refills:  3       * Notice:  This list has 2 medication(s) that are the same as other medications prescribed for you. Read the directions carefully, and ask your doctor or other care provider to review them with you.         Where to get your medicines      These medications were sent to [id number] - [place], [place] - [address] [address]  [address] [address] [place] [address]    Hours:  test fax successful 4/5/04 kr Phone:  [phone]     apixaban ANTICOAGULANT 5 MG tablet    hydroxyurea 500 MG capsule CHEMO                Primary Care Provider Office Phone # Fax #    Kwan Perry Esposito [phone]       [address] [place] [address]  [facility] [address]        Equal Access to Services     DEBRA LUCIA : Hadii aad ku hadasho Soomaali, waaxda luqadaha, qaybta kaalmada adeegyada, waxay idiin hayaan adeeg ashleyaraliudmila laadelfo . So [facility] [phone].    ATENCIÓN: Si habla español, tiene a gonzales disposición servicios gratuitos de asistencia lingüística. [facility] [phone].    We comply with applicable federal civil rights laws and [place] laws. We do not discriminate on the basis of race, color, national origin, age, disability, sex, sexual orientation, or gender identity.            Thank you!     Thank you for choosing [facility]  for your care. Our goal is always to provide you with excellent care. Hearing back from our patients is one way we can continue to improve our services. Please take a few minutes to complete the written survey that you may receive in the mail after your visit with us. Thank you!             Your Updated Medication List - Protect others around you: Learn how to safely use, store and throw away your medicines at www.disposemymeds.org.          This list is accurate as of 11/13/18  1:44 PM.  Always use your most recent med list.                   Brand Name Dispense Instructions for use Diagnosis    apixaban ANTICOAGULANT 5 MG tablet    ELIQUIS    60 tablet    Take 1 tablet (5 mg) by mouth every 12 hours    Chronic myeloproliferative disorder (H), Personal history of venous thrombosis and embolism       * hydroxyurea 500 MG capsule CHEMO    HYDREA    60 capsule    TAKE 2 CAPSULES BY MOUTH ONCE DAILY    Chronic myeloproliferative disease (H), JAK2  gene mutation       * hydroxyurea 500 MG capsule CHEMO    HYDREA    180 capsule    Take 1 capsule (500 mg) by mouth 2 times daily    Chronic myeloproliferative disease (H)       MULTIVITAMINS PO      Take 1 tablet by mouth daily        tamsulosin 0.4 MG capsule    FLOMAX     Take 0.4 mg by mouth as needed        * Notice:  This list has 2 medication(s) that are the same as other medications prescribed for you. Read the directions carefully, and ask your doctor or other care provider to review them with you.

## 2018-11-13 NOTE — PROGRESS NOTES
[facility]  Outpatient Clinic Visit    Jose Luis Parker is a 68-year-old male who returns today for followup.  He is accompanied by his wife.  He was diagnosed in 12/2017 with a left lower extremity deep vein thrombosis and bilateral pulmonary emboli.  These were initially felt to be provoked by prolonged car travel from [place] to [place].  However, soon thereafter, he was identified as having an underlying chronic myeloproliferative disorder (JAK2 positive).      We started Víctor on hydroxyurea which he is currently tolerating without difficulty.  He is on 1000 mg daily (500 mg bid).  He also remains on apixaban which he is tolerating well.  He denies any bleeding issues.  Overall he feels much better than he did earlier this year.  He states that he has much more energy which he attributes in part to being able to work out more regularly.  He and his wife are planning to return to [place] right after Thanksgiving and plan to spend most of the winter there, returning here probably in mid April.     The remainder of his review of systems is negative.      PHYSICAL EXAMINATION:  He looks well.  The ruddiness to his complexion that he had earlier this year has completely resolved.  A more detailed exam was not performed today.      LABORATORY:  CBC today shows a white count of 17.5 with a normal differential, hemoglobin 14.7, platelet count 250,000, his MCV is elevated at 114 (secondary to hydroxyurea).  Reviewing his CBC trends over the last few months, his hematocrit and platelet count have decreased nicely into the normal ranges as expected from his hydroxyurea therapy.        ASSESSMENT AND PLAN:   1.  Chronic myeloproliferative disorder (JAK2 positive).   2.  History of left lower extremity deep vein thrombosis and bilateral pulmonary emboli 12/2017.      Overall Víctor is doing quite well and tolerating hydroxyurea and apixaban without difficulty.  His CBC has responded  appropriately to the hydroxyurea therapy.  I again stressed the importance of continuing to take this medication as directed and the risk of thrombosis should we not keep his counts under good control.  He understands this clearly.  Our plan is to check labs again in about a month.  He will be in [place] by that time but will contact us with the name and location of the lab where he would like to have the testing performed so that we can arrange for orders to be sent.  Assuming things are stable at that point, we can probably plan to just recheck labs when he returns in the early spring.  Thus we will plan for return appointment in mid April 2019.      Total time 25 minutes, all in counseling and coordination of care.        Kwan Esposito MD  Associate Professor of Medicine  Division of Hematology, Oncology, and Transplantation  Director, [facility]

## 2018-11-14 NOTE — NURSING NOTE
Patient with chronic myeloproliferative disorder (Jak2 positive) with history of pulmonary embolism and DVT (12/2017) here with his wife.  After discussion with Dr. Esposito it was decided that, given the normalization of his CBC, no changes would be made in his medications.   A plan for monitoring his myeloproliferative disorder was established as they winter in [place].  Patient instructions discussed.  Mr. Parker has the contact information for the Center and was encouraged to call with questions or concerns.

## 2019-01-16 DIAGNOSIS — D47.1 CHRONIC MYELOPROLIFERATIVE DISORDER (H): ICD-10-CM

## 2019-01-16 DIAGNOSIS — Z86.718 PERSONAL HISTORY OF VENOUS THROMBOSIS AND EMBOLISM: ICD-10-CM

## 2019-01-21 LAB
ERYTHROCYTE [DISTWIDTH] IN BLOOD BY AUTOMATED COUNT: 13.1 %
HCT VFR BLD AUTO: 37.4 %
HEMOGLOBIN: 12.6 G/DL (ref 13.3–17.7)
MCH RBC QN AUTO: 42.6 PG
MCHC RBC AUTO-ENTMCNC: 33.7 G/DL
MCV RBC AUTO: 126 FL
PLATELET # BLD AUTO: 292 10^9/L
RBC # BLD AUTO: 2.96 10^12/L
WBC # BLD AUTO: 14.1 10^9/L

## 2019-02-12 DIAGNOSIS — D47.1 MYELOPROLIFERATIVE DISORDER (H): Primary | ICD-10-CM

## 2019-05-21 ENCOUNTER — OFFICE VISIT (OUTPATIENT)
Dept: HEMATOLOGY | Facility: CLINIC | Age: 69
End: 2019-05-21
Attending: INTERNAL MEDICINE
Payer: COMMERCIAL

## 2019-05-21 VITALS
OXYGEN SATURATION: 100 % | RESPIRATION RATE: 12 BRPM | SYSTOLIC BLOOD PRESSURE: 143 MMHG | BODY MASS INDEX: 22.33 KG/M2 | TEMPERATURE: 98 F | WEIGHT: 156 LBS | HEIGHT: 70 IN | DIASTOLIC BLOOD PRESSURE: 77 MMHG | HEART RATE: 65 BPM

## 2019-05-21 DIAGNOSIS — D47.1 CHRONIC MYELOPROLIFERATIVE DISORDER (H): ICD-10-CM

## 2019-05-21 DIAGNOSIS — D47.1 CHRONIC MYELOPROLIFERATIVE DISORDER (H): Primary | ICD-10-CM

## 2019-05-21 LAB
BASOPHILS # BLD AUTO: 0 10E9/L (ref 0–0.2)
BASOPHILS NFR BLD AUTO: 0.3 %
DIFFERENTIAL METHOD BLD: ABNORMAL
EOSINOPHIL # BLD AUTO: 0.1 10E9/L (ref 0–0.7)
EOSINOPHIL NFR BLD AUTO: 0.4 %
ERYTHROCYTE [DISTWIDTH] IN BLOOD BY AUTOMATED COUNT: 16.9 % (ref 10–15)
HCT VFR BLD AUTO: 44.3 % (ref 40–53)
HGB BLD-MCNC: 14.6 G/DL (ref 13.3–17.7)
IMM GRANULOCYTES # BLD: 0 10E9/L (ref 0–0.4)
IMM GRANULOCYTES NFR BLD: 0.3 %
LYMPHOCYTES # BLD AUTO: 1.5 10E9/L (ref 0.8–5.3)
LYMPHOCYTES NFR BLD AUTO: 12.4 %
MCH RBC QN AUTO: 36.8 PG (ref 26.5–33)
MCHC RBC AUTO-ENTMCNC: 33 G/DL (ref 31.5–36.5)
MCV RBC AUTO: 112 FL (ref 78–100)
MONOCYTES # BLD AUTO: 0.3 10E9/L (ref 0–1.3)
MONOCYTES NFR BLD AUTO: 2.6 %
NEUTROPHILS # BLD AUTO: 9.9 10E9/L (ref 1.6–8.3)
NEUTROPHILS NFR BLD AUTO: 84 %
NRBC # BLD AUTO: 0 10*3/UL
NRBC BLD AUTO-RTO: 0 /100
PLATELET # BLD AUTO: 276 10E9/L (ref 150–450)
RBC # BLD AUTO: 3.97 10E12/L (ref 4.4–5.9)
WBC # BLD AUTO: 11.8 10E9/L (ref 4–11)

## 2019-05-21 PROCEDURE — 99214 OFFICE O/P EST MOD 30 MIN: CPT | Performed by: INTERNAL MEDICINE

## 2019-05-21 PROCEDURE — 85025 COMPLETE CBC W/AUTO DIFF WBC: CPT | Performed by: INTERNAL MEDICINE

## 2019-05-21 PROCEDURE — 36415 COLL VENOUS BLD VENIPUNCTURE: CPT

## 2019-05-21 PROCEDURE — G0463 HOSPITAL OUTPT CLINIC VISIT: HCPCS

## 2019-05-21 ASSESSMENT — MIFFLIN-ST. JEOR: SCORE: 1478.86

## 2019-05-21 ASSESSMENT — PAIN SCALES - GENERAL: PAINLEVEL: NO PAIN (0)

## 2019-05-21 NOTE — NURSING NOTE
Patient with chronic myeloproliferative disorder (JAK2 positive) with history of pulmonary embolism and DVT (imaged 12/2017) here with his wife. After discussion with Dr. Esposito it was decided that he would continue

## 2019-05-21 NOTE — PROGRESS NOTES
[facility]  [address] [place] [address] [address], [place] [address]  Main: [phone], Fax: [phone]        Outpatient Clinic Visit  Date:  05/21/2019    Jose Luis Parker is a 68-year-old male who returns today for followup.  He is accompanied by his wife.  He was diagnosed in 12/2017 with a left lower extremity deep vein thrombosis and bilateral pulmonary emboli.  These were initially felt to be provoked by prolonged car travel from [place] to [place].  However, soon thereafter, he was identified as having an underlying chronic myeloproliferative disorder (JAK2 positive).      We started Víctor on hydroxyurea which he is currently tolerating without difficulty.  He is on 1000 mg daily (500 mg bid).  He also remains on apixaban which he is tolerating well.  He denies any bleeding issues.  Overall he continues to feel very healthy.  He and his wife spent the winter in [place] and plans to return there again later this fall.    He denies any fevers or night sweats.  No new lumps or bumps.  Appetite and energy level are good.  He remains physically quite active.  The remainder of his review of systems is negative.      PHYSICAL EXAMINATION:  He looks healthy.  There is no ruddiness to his complexion (he did have this at the time of his initial diagnosis).  He is afebrile, blood pressure 143/77, pulse 65 and regular, respirations unlabored, O2 saturation 100% on room air.  Head and neck exam is normal.  Lungs are clear.  Heart -- RRR, S1S2, no murmur.  Abdomen is soft and nontender.  Liver and spleen are not palpable.  There is no palpable lymphadenopathy.  No lower extremity edema.     LABORATORY:  CBC today shows a white count of 11.8 with a normal differential, hemoglobin 14.6, platelet count 276,000, his MCV is elevated at 112 (secondary to hydroxyurea).  Reviewing his CBC trends over the last few months, his hematocrit and platelet count have decreased nicely into the normal  ranges as expected from his hydroxyurea therapy, and have remained stable.        ASSESSMENT AND PLAN:   1.  Chronic myeloproliferative disorder (JAK2 positive).   2.  History of left lower extremity deep vein thrombosis and bilateral pulmonary emboli 12/2017.      Overall Víctor is doing quite well and tolerating hydroxyurea and apixaban without difficulty.  His CBC has responded appropriately to the hydroxyurea therapy.  I again stressed the importance of continuing to take this medication as directed and the risk of thrombosis should we not keep his counts under good control.  He understands this clearly.  Our plan is to check labs again when we see him back in 6 months.  He will call with new questions or concerns in the meantime.        Kwan Esposito MD  Associate Professor of Medicine  Division of Hematology, Oncology, and Transplantation  Director, [facility]

## 2019-10-04 ENCOUNTER — HEALTH MAINTENANCE LETTER (OUTPATIENT)
Age: 69
End: 2019-10-04

## 2019-10-19 NOTE — PROGRESS NOTES
"    [facility]  [facility]  [address] [place] [address] [address], [place] [address]  Main: [phone], Fax: [phone]        Outpatient Clinic Visit  Date of Visit: Oct 21 2019      Jose Luis Parker is a 69-year-old male who returns today for follow-up, accompanied by his wife Tom.  He presented in December 2017 with a left lower extremity deep vein thrombosis and bilateral pulmonary emboli.  These were initially felt to be provoked by prolonged car travel from [place] to [place].  However, soon thereafter, he was identified as having an underlying chronic myeloproliferative disorder (JAK2 positive).   Following diagnosis, he was placed on hydroxyurea and apixaban, both of which he continues to take at this time and tolerates well.  We last saw him in a 2019.  He returns today continuing to feel well.  He recently completed a 5K run with his nephew and nephew in law and notes that he out performed both of them.  They are planning to go to [place] again in mid November, returning in March.    Currently on hydrea 500 mg twice daily and apixaban 5 mg every 12 hours.    He denies any bleeding, weight loss, fevers or night sweats. The remainder of his complete review of systems is negative.       PHYSICAL EXAMINATION:    /66 (BP Location: Right arm, Patient Position: Sitting, Cuff Size: Adult Regular)   Pulse 57   Temp 97.8  F (36.6  C) (Oral)   Resp 12   Ht 1.791 m (5' 10.5\")   Wt 72.6 kg (160 lb)   SpO2 99%   BMI 22.63 kg/m      Gen: He looks healthy and younger than his age.  Cardio: mild bradycardia regular w/o murmur  Pulm: CTA  Abdomen: soft, BS, NT, ND, no HSM  Ext: no edema, no cyanosis       LABORATORY:       Ref. Range 9/2/2018 14:09 11/13/2018 12:28 1/21/2019 00:00 5/21/2019 12:39 10/21/2019 12:35   WBC Latest Ref Range: 4.0 - 11.0 10e9/L 13.0 (H) 17.5 (H) 14.1 11.8 (H) 14.9 (H)   Hemoglobin Latest Ref Range: 13.3 - 17.7 g/dL 16.8 14.7 12.6 (A) 14.6 " 15.0   Hematocrit Latest Ref Range: 40.0 - 53.0 % 51.0 43.8 37.4 44.3 44.6   Platelet Count Latest Ref Range: 150 - 450 10e9/L 233 250 292 276 310   RBC Count Latest Ref Range: 4.4 - 5.9 10e12/L 4.75 3.86 (L) 2.96 3.97 (L) 3.65 (L)   MCV Latest Ref Range: 78 - 100 fl 107 (H) 114 (H) 126 112 (H) 122 (H)       ASSESSMENT AND PLAN:   1.  Chronic myeloproliferative disorder (JAK2 positive).   2.  History of left lower extremity deep vein thrombosis and bilateral pulmonary emboli  December 2017.      Overall Víctor is doing quite well and tolerating hydroxyurea and apixaban without difficulty.  His CBC has responded appropriately to the hydroxyurea therapy. No changes in treatment plan.    Will have another CBC w/ diff when he is back to [place] next spring. Either lab check only or clinic visit are reasonable. He will contact us if he needs an appointment, otherwise lab check only in 6 months and follow up visit in a year.     He will call with new questions or concerns in the meantime.    The patient was seen and care plans discussed with Dr. Esposito.     Se chelsi Selby MD  [facility]  Hematology oncology and transplantation fellow  Pager [phone]        HEMATOLOGY STAFF:  Seen with fellow, whose note reflects our joint evaluation, assessment, and plan.        Kwan Esposito MD  Associate Professor of Medicine  Division of Hematology, Oncology, and Transplantation  Director, [facility]

## 2019-10-21 ENCOUNTER — ALLIED HEALTH/NURSE VISIT (OUTPATIENT)
Dept: HEMATOLOGY | Facility: CLINIC | Age: 69
End: 2019-10-21
Payer: COMMERCIAL

## 2019-10-21 ENCOUNTER — OFFICE VISIT (OUTPATIENT)
Dept: HEMATOLOGY | Facility: CLINIC | Age: 69
End: 2019-10-21
Attending: INTERNAL MEDICINE
Payer: COMMERCIAL

## 2019-10-21 VITALS
WEIGHT: 160 LBS | TEMPERATURE: 97.8 F | HEIGHT: 71 IN | SYSTOLIC BLOOD PRESSURE: 137 MMHG | RESPIRATION RATE: 12 BRPM | HEART RATE: 57 BPM | OXYGEN SATURATION: 99 % | DIASTOLIC BLOOD PRESSURE: 66 MMHG | BODY MASS INDEX: 22.4 KG/M2

## 2019-10-21 DIAGNOSIS — D47.1 CHRONIC MYELOPROLIFERATIVE DISORDER (H): Primary | ICD-10-CM

## 2019-10-21 DIAGNOSIS — D47.1 CHRONIC MYELOPROLIFERATIVE DISORDER (H): ICD-10-CM

## 2019-10-21 LAB
ERYTHROCYTE [DISTWIDTH] IN BLOOD BY AUTOMATED COUNT: 11.7 % (ref 10–15)
HCT VFR BLD AUTO: 44.6 % (ref 40–53)
HGB BLD-MCNC: 15 G/DL (ref 13.3–17.7)
MCH RBC QN AUTO: 41.1 PG (ref 26.5–33)
MCHC RBC AUTO-ENTMCNC: 33.6 G/DL (ref 31.5–36.5)
MCV RBC AUTO: 122 FL (ref 78–100)
PLATELET # BLD AUTO: 310 10E9/L (ref 150–450)
RBC # BLD AUTO: 3.65 10E12/L (ref 4.4–5.9)
WBC # BLD AUTO: 14.9 10E9/L (ref 4–11)

## 2019-10-21 PROCEDURE — 99213 OFFICE O/P EST LOW 20 MIN: CPT | Mod: GC | Performed by: INTERNAL MEDICINE

## 2019-10-21 PROCEDURE — 85027 COMPLETE CBC AUTOMATED: CPT | Performed by: INTERNAL MEDICINE

## 2019-10-21 PROCEDURE — G0463 HOSPITAL OUTPT CLINIC VISIT: HCPCS

## 2019-10-21 PROCEDURE — 36415 COLL VENOUS BLD VENIPUNCTURE: CPT

## 2019-10-21 ASSESSMENT — PAIN SCALES - GENERAL: PAINLEVEL: NO PAIN (0)

## 2019-10-21 ASSESSMENT — MIFFLIN-ST. JEOR: SCORE: 1499.95

## 2019-10-21 NOTE — PATIENT INSTRUCTIONS
1.Continue the Eliquis (5 mg orally every 12 hours) and Hydrea (500 mg orally twice a day).  2. Return to clinic annually (laboratory studies in 6 months).

## 2020-02-08 ENCOUNTER — HEALTH MAINTENANCE LETTER (OUTPATIENT)
Age: 70
End: 2020-02-08

## 2020-04-27 DIAGNOSIS — D47.1 CHRONIC MYELOPROLIFERATIVE DISORDER (H): ICD-10-CM

## 2020-04-27 DIAGNOSIS — Z86.718 PERSONAL HISTORY OF VENOUS THROMBOSIS AND EMBOLISM: ICD-10-CM

## 2020-04-27 NOTE — TELEPHONE ENCOUNTER
Jose Luis Parker is on long term anticoagulation with Eliquis.  He was last seen in clinic in October 2019.    Received a request from a [facility] for a refill on Eliquis.  I reached out to Mr. Parker to verify that this is legitimate request.  He confirmed and I told him we would send off the prescription for him.    Trisha Marroquin, RN - Nurse Clinician - [facility] - [phone]

## 2020-09-17 ENCOUNTER — TELEPHONE (OUTPATIENT)
Dept: HEMATOLOGY | Facility: CLINIC | Age: 70
End: 2020-09-17

## 2020-09-17 DIAGNOSIS — D47.1 CHRONIC MYELOPROLIFERATIVE DISEASE (H): Primary | ICD-10-CM

## 2020-09-17 RX ORDER — HYDROXYUREA 500 MG/1
CAPSULE ORAL
Qty: 60 CAPSULE | Refills: 0 | Status: SHIPPED | OUTPATIENT
Start: 2020-09-17 | End: 2024-04-30

## 2020-09-17 NOTE — TELEPHONE ENCOUNTER
Approved electronic refill request for Hydroxyurea for one month.  Patient on schedule with provider for 10/19/2020.  Trisha Marroquin, RN - Nurse Clinician - [facility] - [phone]

## 2020-09-22 DIAGNOSIS — D47.1 CHRONIC MYELOPROLIFERATIVE DISEASE (H): ICD-10-CM

## 2020-09-22 RX ORDER — HYDROXYUREA 500 MG/1
500 CAPSULE ORAL 2 TIMES DAILY
Qty: 180 CAPSULE | Refills: 0 | Status: SHIPPED | OUTPATIENT
Start: 2020-09-22 | End: 2020-10-19

## 2020-10-19 ENCOUNTER — ALLIED HEALTH/NURSE VISIT (OUTPATIENT)
Dept: HEMATOLOGY | Facility: CLINIC | Age: 70
End: 2020-10-19
Payer: COMMERCIAL

## 2020-10-19 ENCOUNTER — OFFICE VISIT (OUTPATIENT)
Dept: HEMATOLOGY | Facility: CLINIC | Age: 70
End: 2020-10-19
Attending: INTERNAL MEDICINE
Payer: COMMERCIAL

## 2020-10-19 VITALS
DIASTOLIC BLOOD PRESSURE: 65 MMHG | WEIGHT: 162.8 LBS | RESPIRATION RATE: 14 BRPM | TEMPERATURE: 97.8 F | OXYGEN SATURATION: 99 % | BODY MASS INDEX: 22.79 KG/M2 | HEART RATE: 75 BPM | SYSTOLIC BLOOD PRESSURE: 137 MMHG | HEIGHT: 71 IN

## 2020-10-19 DIAGNOSIS — D47.1 CHRONIC MYELOPROLIFERATIVE DISEASE (H): ICD-10-CM

## 2020-10-19 DIAGNOSIS — D47.1 CHRONIC MYELOPROLIFERATIVE DISORDER (H): ICD-10-CM

## 2020-10-19 DIAGNOSIS — Z15.89 JAK2 GENE MUTATION: ICD-10-CM

## 2020-10-19 LAB
BASOPHILS # BLD AUTO: 0.1 10E9/L (ref 0–0.2)
BASOPHILS NFR BLD AUTO: 0.7 %
DIFFERENTIAL METHOD BLD: ABNORMAL
EOSINOPHIL # BLD AUTO: 0.1 10E9/L (ref 0–0.7)
EOSINOPHIL NFR BLD AUTO: 0.7 %
ERYTHROCYTE [DISTWIDTH] IN BLOOD BY AUTOMATED COUNT: 13.2 % (ref 10–15)
HCT VFR BLD AUTO: 38.8 % (ref 40–53)
HGB BLD-MCNC: 13.4 G/DL (ref 13.3–17.7)
IMM GRANULOCYTES # BLD: 0.1 10E9/L (ref 0–0.4)
IMM GRANULOCYTES NFR BLD: 0.8 %
LYMPHOCYTES # BLD AUTO: 1.6 10E9/L (ref 0.8–5.3)
LYMPHOCYTES NFR BLD AUTO: 8.7 %
MCH RBC QN AUTO: 42 PG (ref 26.5–33)
MCHC RBC AUTO-ENTMCNC: 34.5 G/DL (ref 31.5–36.5)
MCV RBC AUTO: 122 FL (ref 78–100)
MONOCYTES # BLD AUTO: 0.4 10E9/L (ref 0–1.3)
MONOCYTES NFR BLD AUTO: 2.4 %
NEUTROPHILS # BLD AUTO: 15.8 10E9/L (ref 1.6–8.3)
NEUTROPHILS NFR BLD AUTO: 86.7 %
NRBC # BLD AUTO: 0 10*3/UL
NRBC BLD AUTO-RTO: 0 /100
PLATELET # BLD AUTO: 353 10E9/L (ref 150–450)
RBC # BLD AUTO: 3.19 10E12/L (ref 4.4–5.9)
WBC # BLD AUTO: 18.2 10E9/L (ref 4–11)

## 2020-10-19 PROCEDURE — 36415 COLL VENOUS BLD VENIPUNCTURE: CPT

## 2020-10-19 PROCEDURE — G0463 HOSPITAL OUTPT CLINIC VISIT: HCPCS

## 2020-10-19 PROCEDURE — 99213 OFFICE O/P EST LOW 20 MIN: CPT | Performed by: INTERNAL MEDICINE

## 2020-10-19 PROCEDURE — 85025 COMPLETE CBC W/AUTO DIFF WBC: CPT | Performed by: INTERNAL MEDICINE

## 2020-10-19 RX ORDER — HYDROXYUREA 500 MG/1
CAPSULE ORAL
Qty: 60 CAPSULE | Refills: 1 | Status: CANCELLED | OUTPATIENT
Start: 2020-10-19

## 2020-10-19 RX ORDER — HYDROXYUREA 500 MG/1
500 CAPSULE ORAL 2 TIMES DAILY
Qty: 180 CAPSULE | Refills: 0 | Status: SHIPPED | OUTPATIENT
Start: 2020-10-19 | End: 2021-03-12

## 2020-10-19 ASSESSMENT — PAIN SCALES - GENERAL: PAINLEVEL: NO PAIN (0)

## 2020-10-19 ASSESSMENT — MIFFLIN-ST. JEOR: SCORE: 1507.65

## 2020-10-19 NOTE — PROGRESS NOTES
[facility]  [facility]  [facility] [address], [place] [address]  Main: [phone], Fax: [phone]          Outpatient Clinic Visit  Date of Visit: 10/19/2020      Jose Luis Parker is a 70-year-old male who returns today for follow-up, accompanied by his wife Tom.  He presented in December 2017 with a left lower extremity deep vein thrombosis and bilateral pulmonary emboli.  These were initially felt to be provoked by prolonged car travel from [place] to [place].  However, soon thereafter, he was identified as having an underlying chronic myeloproliferative disorder (JAK2 positive).   Following diagnosis, he was placed on hydroxyurea and apixaban, both of which he continues to take at this time and tolerates well.      We last saw him in October 2019.  He returns today continuing to feel well. They are planning to go to [place] again in mid November, returning in March.    Currently on hydrea 500 mg twice daily and apixaban 5 mg every 12 hours.  Note that he is receiving the apixaban from [facility], due to cost issues.    He denies any bleeding, weight loss, fevers or night sweats. The remainder of his complete review of systems is negative.       PHYSICAL EXAMINATION:    He looks healthy and younger than his stated age.  Detailed exam not performed today.     LABORATORY:    CBC today shows a white count of 18.2 with an unremarkable differential, hemoglobin 13.4, hematocrit 38.8, platelet count 353,000.  MCV is elevated at 122 which is expected effect of Hydrea.  Note that his white count has been persistently elevated since his diagnosis of the chronic myeloproliferative disorder.        ASSESSMENT AND PLAN:   1.  Chronic myeloproliferative disorder (JAK2 positive).   2.  History of left lower extremity deep vein thrombosis and bilateral pulmonary emboli  December 2017.      Overall Víctor is doing quite well and tolerating hydroxyurea and apixaban  without difficulty.  His blood counts remain controlled on hydroxyurea.  His white count has not decreased in the normal range, but his hematocrit and platelet count had been well controlled.  Thus we will make no changes in Hydrea dosing today.    I encouraged him to have another CBC checked in about 6 months.  He will be in [place] at this time and will contact us to help coordinate orders being sent to a local clinic.    He continues to do well on long-term anticoagulation with apixaban.  We will make no change in that part of his care plan.  I reminded him of the importance of taking this at strict 12-hour intervals.    We will plan to see him back when he returns to [place] next spring.  He will call with new questions or concerns in the meantime.      Total time 15 minutes, all in counseling and coordination of care.        Kwan Esposito MD  Associate Professor of Medicine  Division of Hematology, Oncology, and Transplantation  Director, [facility]

## 2020-11-08 ENCOUNTER — HEALTH MAINTENANCE LETTER (OUTPATIENT)
Age: 70
End: 2020-11-08

## 2021-03-12 DIAGNOSIS — D47.1 CHRONIC MYELOPROLIFERATIVE DISEASE (H): ICD-10-CM

## 2021-03-12 RX ORDER — HYDROXYUREA 500 MG/1
500 CAPSULE ORAL 2 TIMES DAILY
Qty: 180 CAPSULE | Refills: 1 | Status: SHIPPED | OUTPATIENT
Start: 2021-03-12 | End: 2024-04-30

## 2021-03-27 ENCOUNTER — HEALTH MAINTENANCE LETTER (OUTPATIENT)
Age: 71
End: 2021-03-27

## 2021-08-03 DIAGNOSIS — Z86.718 PERSONAL HISTORY OF VENOUS THROMBOSIS AND EMBOLISM: ICD-10-CM

## 2021-08-03 DIAGNOSIS — D47.1 MYELOPROLIFERATIVE DISORDER (H): Primary | ICD-10-CM

## 2021-08-06 ENCOUNTER — LAB (OUTPATIENT)
Dept: LAB | Facility: OTHER | Age: 71
End: 2021-08-06
Payer: COMMERCIAL

## 2021-08-06 DIAGNOSIS — D47.1 MYELOPROLIFERATIVE DISORDER (H): ICD-10-CM

## 2021-08-06 DIAGNOSIS — Z86.718 PERSONAL HISTORY OF VENOUS THROMBOSIS AND EMBOLISM: ICD-10-CM

## 2021-08-06 LAB
ALBUMIN SERPL-MCNC: 3.2 G/DL (ref 3.4–5)
ALP SERPL-CCNC: 73 U/L (ref 40–150)
ALT SERPL W P-5'-P-CCNC: 13 U/L (ref 0–70)
ANION GAP SERPL CALCULATED.3IONS-SCNC: 3 MMOL/L (ref 3–14)
AST SERPL W P-5'-P-CCNC: 13 U/L (ref 0–45)
BASOPHILS # BLD MANUAL: 0.2 10E3/UL (ref 0–0.2)
BASOPHILS NFR BLD MANUAL: 1 %
BILIRUB SERPL-MCNC: 0.9 MG/DL (ref 0.2–1.3)
BUN SERPL-MCNC: 21 MG/DL (ref 7–30)
CALCIUM SERPL-MCNC: 8.5 MG/DL (ref 8.5–10.1)
CHLORIDE BLD-SCNC: 106 MMOL/L (ref 94–109)
CO2 SERPL-SCNC: 27 MMOL/L (ref 20–32)
CREAT SERPL-MCNC: 1.24 MG/DL (ref 0.66–1.25)
EOSINOPHIL # BLD MANUAL: 0.4 10E3/UL (ref 0–0.7)
EOSINOPHIL NFR BLD MANUAL: 2 %
ERYTHROCYTE [DISTWIDTH] IN BLOOD BY AUTOMATED COUNT: 13 % (ref 10–15)
GFR SERPL CREATININE-BSD FRML MDRD: 58 ML/MIN/1.73M2
GLUCOSE BLD-MCNC: 85 MG/DL (ref 70–99)
HCT VFR BLD AUTO: 44.4 % (ref 40–53)
HGB BLD-MCNC: 14.8 G/DL (ref 13.3–17.7)
LYMPHOCYTES # BLD MANUAL: 1.6 10E3/UL (ref 0.8–5.3)
LYMPHOCYTES NFR BLD MANUAL: 9 %
MCH RBC QN AUTO: 38 PG (ref 26.5–33)
MCHC RBC AUTO-ENTMCNC: 33.3 G/DL (ref 31.5–36.5)
MCV RBC AUTO: 114 FL (ref 78–100)
MONOCYTES # BLD MANUAL: 0.5 10E3/UL (ref 0–1.3)
MONOCYTES NFR BLD MANUAL: 3 %
MYELOCYTES # BLD MANUAL: 0.2 10E3/UL
MYELOCYTES NFR BLD MANUAL: 1 %
NEUTROPHILS # BLD MANUAL: 14.9 10E3/UL (ref 1.6–8.3)
NEUTROPHILS NFR BLD MANUAL: 84 %
PLAT MORPH BLD: ABNORMAL
PLATELET # BLD AUTO: 293 10E3/UL (ref 150–450)
POTASSIUM BLD-SCNC: 4.3 MMOL/L (ref 3.4–5.3)
PROT SERPL-MCNC: 7.5 G/DL (ref 6.8–8.8)
RBC # BLD AUTO: 3.89 10E6/UL (ref 4.4–5.9)
RBC MORPH BLD: ABNORMAL
SODIUM SERPL-SCNC: 136 MMOL/L (ref 133–144)
WBC # BLD AUTO: 17.7 10E3/UL (ref 4–11)

## 2021-08-06 PROCEDURE — 80053 COMPREHEN METABOLIC PANEL: CPT

## 2021-08-06 PROCEDURE — 36415 COLL VENOUS BLD VENIPUNCTURE: CPT

## 2021-08-06 PROCEDURE — 85027 COMPLETE CBC AUTOMATED: CPT

## 2021-08-09 ENCOUNTER — OFFICE VISIT (OUTPATIENT)
Dept: HEMATOLOGY | Facility: CLINIC | Age: 71
End: 2021-08-09
Attending: INTERNAL MEDICINE
Payer: COMMERCIAL

## 2021-08-09 VITALS
RESPIRATION RATE: 14 BRPM | OXYGEN SATURATION: 99 % | WEIGHT: 159 LBS | HEART RATE: 72 BPM | BODY MASS INDEX: 22.26 KG/M2 | DIASTOLIC BLOOD PRESSURE: 75 MMHG | SYSTOLIC BLOOD PRESSURE: 133 MMHG | TEMPERATURE: 97.8 F | HEIGHT: 71 IN

## 2021-08-09 DIAGNOSIS — Z15.89 JAK2 GENE MUTATION: ICD-10-CM

## 2021-08-09 DIAGNOSIS — Z86.718 PERSONAL HISTORY OF VENOUS THROMBOSIS AND EMBOLISM: ICD-10-CM

## 2021-08-09 DIAGNOSIS — Z79.01 CURRENT USE OF LONG TERM ANTICOAGULATION: ICD-10-CM

## 2021-08-09 DIAGNOSIS — D47.1 CHRONIC MYELOPROLIFERATIVE DISORDER (H): Primary | ICD-10-CM

## 2021-08-09 PROCEDURE — G0463 HOSPITAL OUTPT CLINIC VISIT: HCPCS

## 2021-08-09 PROCEDURE — 99214 OFFICE O/P EST MOD 30 MIN: CPT | Performed by: INTERNAL MEDICINE

## 2021-08-09 ASSESSMENT — PAIN SCALES - GENERAL: PAINLEVEL: NO PAIN (0)

## 2021-08-09 ASSESSMENT — MIFFLIN-ST. JEOR: SCORE: 1490.41

## 2021-08-09 NOTE — PROGRESS NOTES
[facility]  [facility]  [facility] [address], [place] [address]  Main: [phone], Fax: [phone]          Outpatient Clinic Visit  Date of Visit: 08/09/2021      Jose Luis Parker is a 70-year-old male who returns today for follow-up of his chronic myeloproliferative disorder and history of venous thromboembolism, accompanied by his wife Tom.  He presented in December 2017 with a left lower extremity deep vein thrombosis and bilateral pulmonary emboli.  These were initially felt to be provoked by prolonged car travel from [place] to [place].  However, soon thereafter, he was identified as having an underlying chronic myeloproliferative disorder (JAK2 positive).   Following diagnosis, he was placed on hydroxyurea and apixaban, both of which he continues to take at this time and tolerates well.      We last saw him in October 2020.  He returns today continuing to feel well. They are planning to go to [place] again in mid November, returning in March.    Currently on hydrea 500 mg twice daily and apixaban 2.5 mg every 12 hours.  Note that he is receiving the apixaban from [facility], due to cost issues.  He also mentions today that although he had initially been on 5 mg of apixaban twice daily, approximately 2 years ago he began splitting the tablets in half and so he is only taking 2.5 mg twice daily.    He denies any bleeding, weight loss, fevers or night sweats. The remainder of his complete review of systems is negative.       PHYSICAL EXAMINATION:    He looks healthy and younger than his stated age.  Detailed exam not performed today.     LABORATORY:    CBC shows a white count of 17.7 with an unremarkable differential, hemoglobin 14.8, hematocrit 44.4, platelet count 293,000.  MCV is elevated at 114 which is expected effect of Hydrea.  Note that his white count has been persistently elevated since his diagnosis of the chronic myeloproliferative  disorder.  Comprehensive metabolic panel shows normal liver and renal function.        ASSESSMENT AND PLAN:   1.  Chronic myeloproliferative disorder (JAK2 positive).   2.  History of left lower extremity deep vein thrombosis and bilateral pulmonary emboli  December 2017.      Overall Víctor is doing quite well and tolerating hydroxyurea and apixaban without difficulty.  His blood counts remain controlled on hydroxyurea.  His white count has not decreased in the normal range, but his hematocrit and platelet count had been well controlled.  Thus we will make no changes in Hydrea dosing today.  I encouraged him to have another CBC checked in about 6 months.  He will be in [place] at this time but has had his blood counts checked there before and will forward results to us.  He will continue on apixaban 2.5 mg every 12 hours.    We will plan to see him back when he returns to [place] next spring.  He will call with new questions or concerns in the meantime.      Total time 30 minutes, including review of medical records and labs, clinic visit, and documentation.        Kwan Esposito MD  Professor of Medicine  Division of Hematology, Oncology, and Transplantation  Director, [facility]

## 2021-08-09 NOTE — NURSING NOTE
Jose Luis Parker  [id number]  1950    Jose Luis Parker is here for Return Clot visit with Dr. Esposito .   Vital signs were taken and assessed.  Allergies and medications were reviewed and updated by [facility] staff.   Lety Blake, MSN, RN, PHN -Nurse Clinician, [facility] [phone]

## 2021-09-11 ENCOUNTER — HEALTH MAINTENANCE LETTER (OUTPATIENT)
Age: 71
End: 2021-09-11

## 2022-04-09 DIAGNOSIS — D47.1 CHRONIC MYELOPROLIFERATIVE DISORDER (H): Primary | ICD-10-CM

## 2022-04-11 ENCOUNTER — ALLIED HEALTH/NURSE VISIT (OUTPATIENT)
Dept: HEMATOLOGY | Facility: CLINIC | Age: 72
End: 2022-04-11
Payer: COMMERCIAL

## 2022-04-11 ENCOUNTER — OFFICE VISIT (OUTPATIENT)
Dept: HEMATOLOGY | Facility: CLINIC | Age: 72
End: 2022-04-11
Payer: COMMERCIAL

## 2022-04-11 VITALS
OXYGEN SATURATION: 98 % | RESPIRATION RATE: 16 BRPM | HEIGHT: 71 IN | DIASTOLIC BLOOD PRESSURE: 88 MMHG | BODY MASS INDEX: 22.6 KG/M2 | HEART RATE: 81 BPM | WEIGHT: 161.4 LBS | SYSTOLIC BLOOD PRESSURE: 179 MMHG | TEMPERATURE: 96.4 F

## 2022-04-11 DIAGNOSIS — D47.1 CHRONIC MYELOPROLIFERATIVE DISORDER (H): ICD-10-CM

## 2022-04-11 LAB
ALBUMIN SERPL-MCNC: 3.3 G/DL (ref 3.4–5)
ALP SERPL-CCNC: 117 U/L (ref 40–150)
ALT SERPL W P-5'-P-CCNC: 37 U/L (ref 0–70)
ANION GAP SERPL CALCULATED.3IONS-SCNC: 5 MMOL/L (ref 3–14)
AST SERPL W P-5'-P-CCNC: 46 U/L (ref 0–45)
BASOPHILS # BLD MANUAL: 0 10E3/UL (ref 0–0.2)
BASOPHILS NFR BLD MANUAL: 0 %
BILIRUB SERPL-MCNC: 1.2 MG/DL (ref 0.2–1.3)
BUN SERPL-MCNC: 21 MG/DL (ref 7–30)
CALCIUM SERPL-MCNC: 8.8 MG/DL (ref 8.5–10.1)
CHLORIDE BLD-SCNC: 105 MMOL/L (ref 94–109)
CO2 SERPL-SCNC: 24 MMOL/L (ref 20–32)
CREAT SERPL-MCNC: 1.28 MG/DL (ref 0.66–1.25)
EOSINOPHIL # BLD MANUAL: 0.3 10E3/UL (ref 0–0.7)
EOSINOPHIL NFR BLD MANUAL: 1 %
ERYTHROCYTE [DISTWIDTH] IN BLOOD BY AUTOMATED COUNT: 14.3 % (ref 10–15)
FRAGMENTS BLD QL SMEAR: SLIGHT
GFR SERPL CREATININE-BSD FRML MDRD: 59 ML/MIN/1.73M2
GLUCOSE BLD-MCNC: 90 MG/DL (ref 70–99)
HCT VFR BLD AUTO: 40.4 % (ref 40–53)
HGB BLD-MCNC: 13.7 G/DL (ref 13.3–17.7)
LYMPHOCYTES # BLD MANUAL: 1.8 10E3/UL (ref 0.8–5.3)
LYMPHOCYTES NFR BLD MANUAL: 7 %
MCH RBC QN AUTO: 40.3 PG (ref 26.5–33)
MCHC RBC AUTO-ENTMCNC: 33.9 G/DL (ref 31.5–36.5)
MCV RBC AUTO: 119 FL (ref 78–100)
MONOCYTES # BLD MANUAL: 0 10E3/UL (ref 0–1.3)
MONOCYTES NFR BLD MANUAL: 0 %
NEUTROPHILS # BLD MANUAL: 23 10E3/UL (ref 1.6–8.3)
NEUTROPHILS NFR BLD MANUAL: 92 %
PLAT MORPH BLD: ABNORMAL
PLATELET # BLD AUTO: 405 10E3/UL (ref 150–450)
POTASSIUM BLD-SCNC: 4.7 MMOL/L (ref 3.4–5.3)
PROT SERPL-MCNC: 8.4 G/DL (ref 6.8–8.8)
RBC # BLD AUTO: 3.4 10E6/UL (ref 4.4–5.9)
RBC MORPH BLD: ABNORMAL
SODIUM SERPL-SCNC: 134 MMOL/L (ref 133–144)
WBC # BLD AUTO: 25 10E3/UL (ref 4–11)

## 2022-04-11 PROCEDURE — 80053 COMPREHEN METABOLIC PANEL: CPT

## 2022-04-11 PROCEDURE — G0463 HOSPITAL OUTPT CLINIC VISIT: HCPCS

## 2022-04-11 PROCEDURE — 99214 OFFICE O/P EST MOD 30 MIN: CPT | Performed by: INTERNAL MEDICINE

## 2022-04-11 PROCEDURE — 85027 COMPLETE CBC AUTOMATED: CPT

## 2022-04-11 PROCEDURE — 999N000103 HC STATISTIC NO CHARGE FACILITY FEE

## 2022-04-11 PROCEDURE — 36415 COLL VENOUS BLD VENIPUNCTURE: CPT

## 2022-04-11 RX ORDER — HYDROXYUREA 500 MG/1
CAPSULE ORAL
Qty: 180 CAPSULE | Refills: 4 | Status: SHIPPED | OUTPATIENT
Start: 2022-04-11 | End: 2023-04-10

## 2022-04-11 NOTE — PROGRESS NOTES
"    [facility]  [facility]  [address], [address], [place] [address]  Main: [phone], Fax: [phone]      Outpatient Visit Note:    Patient: Jose Luis Parker  MRN: [id number]  : 1950  HANNAH: 2022      Víctor Parker is a 71 year old man with a history of chronic myeloproliferative disorder (JAK2+) and left lower extremity DVT + bilateral PE in , here today for routine follow-up.    Background history:  He presented in 2017 with a left lower extremity deep vein thrombosis and bilateral pulmonary emboli.  These were initially felt to be provoked by prolonged car travel from [place] to [place].  However, soon thereafter, he was identified as having an underlying chronic myeloproliferative disorder (JAK2 +). Following diagnosis, he was placed on hydroxyurea and apixaban.      Interval History:   Víctor returns to clinic today for routine follow-up. He is accompanied by his wife, Tom. They spend goodson in [place], [place] and returned back to [place] recently. They are physically active, hiking and working out daily.  He moved 300 bags of [place] yesterday. He takes hydrea and apixaban daily as prescribed. He denies bleeding or bruising symptoms. No fever, chills, night sweats, weight loss or recent infections.  No recent history of headache, vision changes, lightheadedness, sob, cough, chest pain, palpitations, n/v/d, bowel or bladder changes, abdominal pain, fatigue.     Medications:  Hydroxyurea 1000 mg daily  Apixaban 5 mg twice daily    Exam:  BP (!) 179/88   Pulse 81   Temp (!) 96.4  F (35.8  C) (Oral)   Resp 16   Ht 1.791 m (5' 10.5\")   Wt 73.2 kg (161 lb 6.4 oz)   SpO2 98%   BMI 22.83 kg/m    GENERAL:  Alert, no acute distress  HEAD/NECK:  PERRL, EOMI, no scleral icterus  LYMPH NODES: No palpable cervical, axillary, or inguinal lymph nodes  LUNGS:  Clear to auscultation b/l, no wheezes or crackles  CARDIOVASCULAR:  " Regular rate and rhythm, normal S1, S2 without rub, gallop or murmur  EXTREMITIES:  No lower extremity edema.   NEURO: CN 3-12 grossly intact, equal motor strength in major muscle groups of all four extremities  SKIN: No petechiae or ecchymoses on visible skin  PSYCH: Normal affect       Labs:   Latest Reference Range & Units 10/19/20 13:05 08/06/21 11:17 04/11/22 13:58   WBC 4.0 - 11.0 10e3/uL 18.2 (H) 17.7 (H) 25.0 (H)   Hemoglobin 13.3 - 17.7 g/dL 13.4 14.8 13.7   Hematocrit 40.0 - 53.0 % 38.8 (L) 44.4 40.4   Platelet Count 150 - 450 10e3/uL 353 293 405   RBC Count 4.40 - 5.90 10e6/uL 3.19 (L) 3.89 (L) 3.40 (L)   MCV 78 - 100 fL 122 (H) 114 (H) 119 (H)   MCH 26.5 - 33.0 pg 42.0 (H) 38.0 (H) 40.3 (H)   MCHC 31.5 - 36.5 g/dL 34.5 33.3 33.9   RDW 10.0 - 15.0 % 13.2 13.0 14.3   Diff Method  Automated Method     % Neutrophils % 86.7 84 92   % Lymphocytes % 8.7 9 7   % Monocytes % 2.4 3 0   % Eosinophils % 0.7 2 1   % Basophils % 0.7 1 0   % Myelocytes %  1    % Immature Granulocytes % 0.8     Absolute Basophils 0.0 - 0.2 10e3/uL  0.2 0.0   Nucleated RBCs 0 /100 0     Absolute Neutrophil 1.6 - 8.3 10e3/uL 15.8 (H) 14.9 (H) 23.0 (H)   Absolute Lymphocytes 0.8 - 5.3 10e3/uL 1.6 1.6 1.8   Absolute Monocytes 0.0 - 1.3 10e3/uL 0.4 0.5 0.0   Absolute Eosinophils 0.0 - 0.7 10e3/uL 0.1 0.4 0.3   Absolute Basophils 0.0 - 0.2 10e9/L 0.1     Absolute Myelocytes <=0.0 10e3/uL  0.2 (H)    Abs Immature Granulocytes 0 - 0.4 10e9/L 0.1     Absolute Nucleated RBC  0.0     RBC Morphology   Confirmed RBC Indices Confirmed RBC Indices   Platelet Morphology Automated Count Confirmed. Platelet morphology is normal.   Automated Count Confirmed. Platelet morphology is normal. Automated Count Confirmed. Platelet morphology is normal.   RBC Fragments None Seen    Slight !   (H): Data is abnormally high  (L): Data is abnormally low  !: Data is abnormal    Imaging:  No new imaging      Assessment:  In summary, Víctor Parker is a 71 year old male  with history of chronic myeloproliferative disorder (JAK2+) and left lower extremity DVT + bilateral PE in 2017. He is on hydrea and apixaban.     He is tolerating hydrea and apixaban without any side effects. Reviewed recent labs including CBC and CMP. We reviewed that his counts are stable. We discussed that he has room to go up on the hydrea in the future if needed but given stable counts and overall clinical status, no indication for any dose changes at present. We discussed repeating labs around Oct/Nov before they return to [place] for the winter and visit with Dr. Esposito at that time.       Plan:  -- Continue hydrea 500 mg BID and apixaban 5 mg BID  -- RTC in fall with labs for visit with Dr. Esposito    Patient was seen and plan discussed with attending physician, Dr. Esposito.    Thalia Linares  Hematology, Oncology & Transplantation Fellow        HEMATOLOGY STAFF:  Seen with fellow, whose note reflects our joint evaluation, assessment, and plan.      Kwan Esposito MD  Professor of Medicine  Division of Hematology, Oncology, and Transplantation  Director, [facility]

## 2022-04-23 ENCOUNTER — HEALTH MAINTENANCE LETTER (OUTPATIENT)
Age: 72
End: 2022-04-23

## 2022-10-06 ENCOUNTER — TELEPHONE (OUTPATIENT)
Dept: HEMATOLOGY | Facility: CLINIC | Age: 72
End: 2022-10-06

## 2022-10-06 NOTE — TELEPHONE ENCOUNTER
Víctor Parker is a 71 year old man with a history of chronic myeloproliferative disorder (JAK2+) and left lower extremity DVT + bilateral PE in 2017.    Víctor called today to inform the [facility] that he will not be able to attend his upcoming appointment on 10/17 @130 d/t needing to attend a . Discussed with Dr. Esposito. Offered Víctor  @9am for a return visit with Dr. Esposito. Víctor accepted this appointment and expressed his gratitude for the flexibility.    He denies other concerns or questions at this time.    Precious CROFTN, RN, PHN   [facility]   Office: [phone]  Fax: [phone]

## 2022-10-29 ENCOUNTER — HEALTH MAINTENANCE LETTER (OUTPATIENT)
Age: 72
End: 2022-10-29

## 2022-11-06 ENCOUNTER — LAB (OUTPATIENT)
Dept: LAB | Facility: CLINIC | Age: 72
End: 2022-11-06
Payer: COMMERCIAL

## 2022-11-06 DIAGNOSIS — D47.1 CHRONIC MYELOPROLIFERATIVE DISORDER (H): ICD-10-CM

## 2022-11-06 LAB
BASOPHILS # BLD AUTO: 0.1 10E3/UL (ref 0–0.2)
BASOPHILS NFR BLD AUTO: 0 %
EOSINOPHIL # BLD AUTO: 0.2 10E3/UL (ref 0–0.7)
EOSINOPHIL NFR BLD AUTO: 1 %
ERYTHROCYTE [DISTWIDTH] IN BLOOD BY AUTOMATED COUNT: 13.5 % (ref 10–15)
HCT VFR BLD AUTO: 48 % (ref 40–53)
HGB BLD-MCNC: 16.4 G/DL (ref 13.3–17.7)
LYMPHOCYTES # BLD AUTO: 1.6 10E3/UL (ref 0.8–5.3)
LYMPHOCYTES NFR BLD AUTO: 7 %
MCH RBC QN AUTO: 37.1 PG (ref 26.5–33)
MCHC RBC AUTO-ENTMCNC: 34.2 G/DL (ref 31.5–36.5)
MCV RBC AUTO: 109 FL (ref 78–100)
MONOCYTES # BLD AUTO: 0.5 10E3/UL (ref 0–1.3)
MONOCYTES NFR BLD AUTO: 2 %
NEUTROPHILS # BLD AUTO: 20.2 10E3/UL (ref 1.6–8.3)
NEUTROPHILS NFR BLD AUTO: 89 %
PLATELET # BLD AUTO: 268 10E3/UL (ref 150–450)
RBC # BLD AUTO: 4.42 10E6/UL (ref 4.4–5.9)
WBC # BLD AUTO: 22.6 10E3/UL (ref 4–11)

## 2022-11-06 PROCEDURE — 36415 COLL VENOUS BLD VENIPUNCTURE: CPT

## 2022-11-06 PROCEDURE — 80053 COMPREHEN METABOLIC PANEL: CPT

## 2022-11-06 PROCEDURE — 85025 COMPLETE CBC W/AUTO DIFF WBC: CPT

## 2022-11-07 LAB
ALBUMIN SERPL-MCNC: 3.4 G/DL (ref 3.4–5)
ALP SERPL-CCNC: 79 U/L (ref 40–150)
ALT SERPL W P-5'-P-CCNC: 18 U/L (ref 0–70)
ANION GAP SERPL CALCULATED.3IONS-SCNC: 7 MMOL/L (ref 3–14)
AST SERPL W P-5'-P-CCNC: 21 U/L (ref 0–45)
BILIRUB SERPL-MCNC: 0.7 MG/DL (ref 0.2–1.3)
BUN SERPL-MCNC: 24 MG/DL (ref 7–30)
CALCIUM SERPL-MCNC: 8.9 MG/DL (ref 8.5–10.1)
CHLORIDE BLD-SCNC: 106 MMOL/L (ref 94–109)
CO2 SERPL-SCNC: 26 MMOL/L (ref 20–32)
CREAT SERPL-MCNC: 1.35 MG/DL (ref 0.66–1.25)
GFR SERPL CREATININE-BSD FRML MDRD: 55 ML/MIN/1.73M2
GLUCOSE BLD-MCNC: 97 MG/DL (ref 70–99)
POTASSIUM BLD-SCNC: 4.1 MMOL/L (ref 3.4–5.3)
PROT SERPL-MCNC: 8.7 G/DL (ref 6.8–8.8)
SODIUM SERPL-SCNC: 139 MMOL/L (ref 133–144)

## 2022-11-08 ENCOUNTER — OFFICE VISIT (OUTPATIENT)
Dept: HEMATOLOGY | Facility: CLINIC | Age: 72
End: 2022-11-08
Attending: INTERNAL MEDICINE
Payer: COMMERCIAL

## 2022-11-08 VITALS
HEART RATE: 80 BPM | SYSTOLIC BLOOD PRESSURE: 177 MMHG | OXYGEN SATURATION: 99 % | BODY MASS INDEX: 22.35 KG/M2 | DIASTOLIC BLOOD PRESSURE: 87 MMHG | WEIGHT: 158 LBS

## 2022-11-08 DIAGNOSIS — Z79.01 CURRENT USE OF LONG TERM ANTICOAGULATION: ICD-10-CM

## 2022-11-08 DIAGNOSIS — Z86.718 PERSONAL HISTORY OF VENOUS THROMBOSIS AND EMBOLISM: ICD-10-CM

## 2022-11-08 DIAGNOSIS — D47.1 CHRONIC MYELOPROLIFERATIVE DISORDER (H): Primary | ICD-10-CM

## 2022-11-08 PROCEDURE — 99215 OFFICE O/P EST HI 40 MIN: CPT | Performed by: INTERNAL MEDICINE

## 2022-11-08 PROCEDURE — G0463 HOSPITAL OUTPT CLINIC VISIT: HCPCS

## 2022-11-08 NOTE — PROGRESS NOTES
[facility]  Center for Bleeding [facility] Clotting [facility]  [facility] [address], [address], [place] [address]  Main: [phone], Fax: [phone]          Outpatient Clinic Visit  Date:  11/08/2022      Víctor Parker is a 72 year old man with a history of chronic myeloproliferative disorder (JAK2+) and left lower extremity DVT + bilateral PE in 2017, here today for routine follow-up.    Background history:  He presented in December 2017 with a left lower extremity deep vein thrombosis and bilateral pulmonary emboli.  These were initially felt to be provoked by prolonged car travel from [place] to [place].  However, soon thereafter, he was identified as having an underlying chronic myeloproliferative disorder (JAK2 +). Following diagnosis, he was placed on hydroxyurea and apixaban.      Interval History:   Víctor returns to clinic today for routine follow-up. He is accompanied by his wife, Tom. They continue to spend goodson in [place], [place] and will be leaving for the winter in the next few days with plans to return in March.  Overall, he continues to feel well and remains vigorously active, exercising daily.  He continues to take hydroxyurea and apixaban as prescribed.  He denies any bleeding or bruising symptoms.  Complete review of systems is negative.    Medications:  Hydroxyurea 1000 mg daily  Apixaban 5 mg twice daily    Exam:  He looks healthy.  Detailed exam not performed today.     Labs:  White count 22.6 (89% neutrophils, 7% lymphocytes, 2% monocytes, 1% eosinophils, 0% basophils).  Hemoglobin 16.4, platelet count 268,000.  MCV elevated at 109 (expected effect of hydroxyurea).  Serum electrolytes are normal, creatinine mildly elevated at 1.35 (this is not new, and likely secondary to under hydration).  LFTs normal.      ASSESSMENT AND PLAN:   1.  Chronic myeloproliferative disorder (JAK2 positive).   2.  History of left lower extremity deep vein thrombosis and bilateral pulmonary emboli   December 2017.      Víctor continues to do well on hydroxyurea and apixaban.  His CBC is stable.  We will make no change in his treatment plan.  We will plan to see him back in the spring when he returns from [place].  He does have medical care established there should any issues develop over the winter.    Total time 40 minutes, including review of medical records and labs, clinic visit, and documentation.      Kwan Esposito MD  Professor of Medicine  Division of Hematology, Oncology, and Transplantation  Director, [facility]

## 2023-04-05 DIAGNOSIS — Z86.711 HISTORY OF PULMONARY EMBOLISM: ICD-10-CM

## 2023-04-05 DIAGNOSIS — D47.1 CHRONIC MYELOPROLIFERATIVE DISORDER (H): ICD-10-CM

## 2023-04-05 DIAGNOSIS — Z86.718 HISTORY OF DEEP VENOUS THROMBOSIS: Primary | ICD-10-CM

## 2023-04-08 ENCOUNTER — LAB (OUTPATIENT)
Dept: LAB | Facility: CLINIC | Age: 73
End: 2023-04-08
Payer: COMMERCIAL

## 2023-04-08 DIAGNOSIS — D47.1 CHRONIC MYELOPROLIFERATIVE DISORDER (H): ICD-10-CM

## 2023-04-08 DIAGNOSIS — Z86.711 HISTORY OF PULMONARY EMBOLISM: ICD-10-CM

## 2023-04-08 DIAGNOSIS — Z86.718 HISTORY OF DEEP VENOUS THROMBOSIS: ICD-10-CM

## 2023-04-08 LAB
BASOPHILS # BLD AUTO: 0.2 10E3/UL (ref 0–0.2)
BASOPHILS NFR BLD AUTO: 1 %
EOSINOPHIL # BLD AUTO: 0.2 10E3/UL (ref 0–0.7)
EOSINOPHIL NFR BLD AUTO: 1 %
ERYTHROCYTE [DISTWIDTH] IN BLOOD BY AUTOMATED COUNT: 13.9 % (ref 10–15)
HCT VFR BLD AUTO: 41.2 % (ref 40–53)
HGB BLD-MCNC: 13.9 G/DL (ref 13.3–17.7)
LYMPHOCYTES # BLD AUTO: 1.8 10E3/UL (ref 0.8–5.3)
LYMPHOCYTES NFR BLD AUTO: 10 %
MCH RBC QN AUTO: 40.6 PG (ref 26.5–33)
MCHC RBC AUTO-ENTMCNC: 33.7 G/DL (ref 31.5–36.5)
MCV RBC AUTO: 121 FL (ref 78–100)
MONOCYTES # BLD AUTO: 0.5 10E3/UL (ref 0–1.3)
MONOCYTES NFR BLD AUTO: 3 %
NEUTROPHILS # BLD AUTO: 15 10E3/UL (ref 1.6–8.3)
NEUTROPHILS NFR BLD AUTO: 85 %
PLATELET # BLD AUTO: 289 10E3/UL (ref 150–450)
RBC # BLD AUTO: 3.42 10E6/UL (ref 4.4–5.9)
WBC # BLD AUTO: 17.6 10E3/UL (ref 4–11)

## 2023-04-08 PROCEDURE — 85025 COMPLETE CBC W/AUTO DIFF WBC: CPT

## 2023-04-08 PROCEDURE — 36415 COLL VENOUS BLD VENIPUNCTURE: CPT

## 2023-04-08 PROCEDURE — 80053 COMPREHEN METABOLIC PANEL: CPT

## 2023-04-10 ENCOUNTER — OFFICE VISIT (OUTPATIENT)
Dept: HEMATOLOGY | Facility: CLINIC | Age: 73
End: 2023-04-10
Attending: INTERNAL MEDICINE
Payer: COMMERCIAL

## 2023-04-10 VITALS
TEMPERATURE: 97.9 F | WEIGHT: 164.1 LBS | SYSTOLIC BLOOD PRESSURE: 167 MMHG | HEART RATE: 79 BPM | BODY MASS INDEX: 23.21 KG/M2 | OXYGEN SATURATION: 98 % | DIASTOLIC BLOOD PRESSURE: 78 MMHG

## 2023-04-10 DIAGNOSIS — D47.1 CHRONIC MYELOPROLIFERATIVE DISORDER (H): ICD-10-CM

## 2023-04-10 LAB
ALBUMIN SERPL-MCNC: 3.4 G/DL (ref 3.4–5)
ALP SERPL-CCNC: 72 U/L (ref 40–150)
ALT SERPL W P-5'-P-CCNC: 13 U/L (ref 0–70)
ANION GAP SERPL CALCULATED.3IONS-SCNC: 5 MMOL/L (ref 3–14)
AST SERPL W P-5'-P-CCNC: 14 U/L (ref 0–45)
BILIRUB SERPL-MCNC: 0.6 MG/DL (ref 0.2–1.3)
BUN SERPL-MCNC: 24 MG/DL (ref 7–30)
CALCIUM SERPL-MCNC: 8.5 MG/DL (ref 8.5–10.1)
CHLORIDE BLD-SCNC: 107 MMOL/L (ref 94–109)
CO2 SERPL-SCNC: 27 MMOL/L (ref 20–32)
CREAT SERPL-MCNC: 1.34 MG/DL (ref 0.66–1.25)
GFR SERPL CREATININE-BSD FRML MDRD: 56 ML/MIN/1.73M2
GLUCOSE BLD-MCNC: 98 MG/DL (ref 70–99)
POTASSIUM BLD-SCNC: 3.9 MMOL/L (ref 3.4–5.3)
PROT SERPL-MCNC: 7.6 G/DL (ref 6.8–8.8)
SODIUM SERPL-SCNC: 139 MMOL/L (ref 133–144)

## 2023-04-10 PROCEDURE — 99215 OFFICE O/P EST HI 40 MIN: CPT | Performed by: INTERNAL MEDICINE

## 2023-04-10 PROCEDURE — G0463 HOSPITAL OUTPT CLINIC VISIT: HCPCS | Performed by: INTERNAL MEDICINE

## 2023-04-10 RX ORDER — HYDROXYUREA 500 MG/1
CAPSULE ORAL
Qty: 180 CAPSULE | Refills: 4 | Status: SHIPPED | OUTPATIENT
Start: 2023-04-10 | End: 2024-04-30

## 2023-04-26 ENCOUNTER — TELEPHONE (OUTPATIENT)
Dept: HEMATOLOGY | Facility: CLINIC | Age: 73
End: 2023-04-26
Payer: COMMERCIAL

## 2023-04-26 NOTE — TELEPHONE ENCOUNTER
"[id number]  Jose Luis Parker  72 year old male  CBCD Diagnosis: Chronic Myeloproliferative Disorder/LLE DVT & PE  CBCD Provider: Cate VALDEZ called Víctor and his wife Tom to discuss recent clinic visits elevated BP. They report he was \"worked up\" and had just had a \"sauna\". They did get is rechecked and he was within normal range. RN will alert Dr. Esposito they are aware and we encouraged them to follow up with a PCP as needed for continued elevated BP.     Sunita Ariza  MSN, RN, PHN  [facility]otting [facility]  Office: [phone]  Fax: [phone]          "

## 2023-04-26 NOTE — PROGRESS NOTES
[facility]  Center for Bleeding and Clotting Disorders  [facility] [address], [address], [place] [address]  Main: [phone], Fax: [phone]          Outpatient Clinic Visit  Date:  04/10/2023      Víctor Parker is a 72 year old man with a history of chronic myeloproliferative disorder (JAK2+) and left lower extremity DVT with bilateral PE in 2017, here today for routine follow-up.     Background history:  He presented in December 2017 with a left lower extremity deep vein thrombosis and bilateral pulmonary emboli.  These were initially felt to be provoked by prolonged car travel from [place] to [place].  However, soon thereafter, he was identified as having an underlying chronic myeloproliferative disorder (JAK2 +). Following diagnosis, he was placed on hydroxyurea and apixaban.      Interval History:   Víctor returns to clinic today for routine follow-up. He is accompanied by his wife, Tom. They recently returned from their winter holiday in [place], [place].  Overall, he continues to feel well and remains very active, exercising daily.  He continues to take hydroxyurea and apixaban as prescribed.  He denies any bleeding or bruising symptoms.  No new symptoms or concerns.    Medications:  Hydroxyurea 1000 mg daily  Apixaban 5 mg twice daily    Exam:  He appears to be in good health.  He is afebrile, blood pressure is a bit elevated at 167/78, pulse 79 and regular.  Respirations are unlabored with O2 saturation 98% on room air.  Weight is 164 pounds which is up a few pounds from his baseline.  Lungs are clear.  RRR S1S2, no murmur.  No lower extremity edema.  Abdomen is benign.  No hepatosplenomegaly.     Labs:  White count 17.6 (85% neutrophils, 10% lymphocytes, 3% monocytes, 1% eosinophils, 1% basophils).  Hemoglobin 13.9, platelet count 289,000.  MCV elevated at 121 (expected effect of hydroxyurea).  Serum electrolytes are normal, creatinine mildly elevated at 1.34 (this is not new).  LFTs  normal.      ASSESSMENT AND PLAN:   1.  Chronic myeloproliferative disorder (JAK2 positive).   2.  History of left lower extremity deep vein thrombosis and bilateral pulmonary emboli  December 2017.      Víctor continues to do well on hydroxyurea and apixaban.  His CBC is stable.  We will make no change in his treatment plan.  We will plan to see him back in 6 months, sooner with new questions or concerns.    Total time 40 minutes, including review of medical records and labs, clinic visit, and documentation.      Kwan Esposito MD  Professor of Medicine  Division of Hematology, Oncology, and Transplantation  Director, [facility]ot[facility]

## 2023-06-01 ENCOUNTER — HEALTH MAINTENANCE LETTER (OUTPATIENT)
Age: 73
End: 2023-06-01

## 2023-10-03 DIAGNOSIS — D47.1 CHRONIC MYELOPROLIFERATIVE DISORDER (H): Primary | ICD-10-CM

## 2023-10-03 DIAGNOSIS — Z79.01 CURRENT USE OF LONG TERM ANTICOAGULATION: ICD-10-CM

## 2023-10-03 DIAGNOSIS — Z86.711 HISTORY OF PULMONARY EMBOLISM: ICD-10-CM

## 2023-10-03 DIAGNOSIS — Z86.718 HISTORY OF DEEP VENOUS THROMBOSIS: ICD-10-CM

## 2023-10-03 DIAGNOSIS — Z86.718 PERSONAL HISTORY OF VENOUS THROMBOSIS AND EMBOLISM: ICD-10-CM

## 2023-10-07 ENCOUNTER — LAB (OUTPATIENT)
Dept: LAB | Facility: CLINIC | Age: 73
End: 2023-10-07
Payer: COMMERCIAL

## 2023-10-07 DIAGNOSIS — Z79.01 CURRENT USE OF LONG TERM ANTICOAGULATION: ICD-10-CM

## 2023-10-07 DIAGNOSIS — Z86.718 PERSONAL HISTORY OF VENOUS THROMBOSIS AND EMBOLISM: ICD-10-CM

## 2023-10-07 DIAGNOSIS — D47.1 CHRONIC MYELOPROLIFERATIVE DISORDER (H): ICD-10-CM

## 2023-10-07 LAB
ALBUMIN SERPL BCG-MCNC: 3.6 G/DL (ref 3.5–5.2)
ALP SERPL-CCNC: 81 U/L (ref 40–129)
ALT SERPL W P-5'-P-CCNC: <5 U/L (ref 0–70)
ANION GAP SERPL CALCULATED.3IONS-SCNC: 9 MMOL/L (ref 7–15)
AST SERPL W P-5'-P-CCNC: 19 U/L (ref 0–45)
BASO+EOS+MONOS # BLD AUTO: ABNORMAL 10*3/UL
BASO+EOS+MONOS NFR BLD AUTO: ABNORMAL %
BASOPHILS # BLD AUTO: 0.1 10E3/UL (ref 0–0.2)
BASOPHILS NFR BLD AUTO: 0 %
BILIRUB SERPL-MCNC: 0.5 MG/DL
BUN SERPL-MCNC: 18.7 MG/DL (ref 8–23)
CALCIUM SERPL-MCNC: 8.6 MG/DL (ref 8.8–10.2)
CHLORIDE SERPL-SCNC: 103 MMOL/L (ref 98–107)
CREAT SERPL-MCNC: 1.14 MG/DL (ref 0.67–1.17)
DEPRECATED HCO3 PLAS-SCNC: 24 MMOL/L (ref 22–29)
EGFRCR SERPLBLD CKD-EPI 2021: 67 ML/MIN/1.73M2
EOSINOPHIL # BLD AUTO: 0.2 10E3/UL (ref 0–0.7)
EOSINOPHIL NFR BLD AUTO: 1 %
ERYTHROCYTE [DISTWIDTH] IN BLOOD BY AUTOMATED COUNT: 15.2 % (ref 10–15)
GLUCOSE SERPL-MCNC: 86 MG/DL (ref 70–99)
HCT VFR BLD AUTO: 42.5 % (ref 40–53)
HGB BLD-MCNC: 14.3 G/DL (ref 13.3–17.7)
IMM GRANULOCYTES # BLD: 0 10E3/UL
IMM GRANULOCYTES NFR BLD: 0 %
LYMPHOCYTES # BLD AUTO: 1.2 10E3/UL (ref 0.8–5.3)
LYMPHOCYTES NFR BLD AUTO: 8 %
MCH RBC QN AUTO: 40.3 PG (ref 26.5–33)
MCHC RBC AUTO-ENTMCNC: 33.6 G/DL (ref 31.5–36.5)
MCV RBC AUTO: 120 FL (ref 78–100)
MONOCYTES # BLD AUTO: 0.3 10E3/UL (ref 0–1.3)
MONOCYTES NFR BLD AUTO: 2 %
NEUTROPHILS # BLD AUTO: 13.8 10E3/UL (ref 1.6–8.3)
NEUTROPHILS NFR BLD AUTO: 89 %
PLATELET # BLD AUTO: 337 10E3/UL (ref 150–450)
POTASSIUM SERPL-SCNC: 4.6 MMOL/L (ref 3.4–5.3)
PROT SERPL-MCNC: 7.9 G/DL (ref 6.4–8.3)
RBC # BLD AUTO: 3.55 10E6/UL (ref 4.4–5.9)
SODIUM SERPL-SCNC: 136 MMOL/L (ref 135–145)
WBC # BLD AUTO: 15.6 10E3/UL (ref 4–11)

## 2023-10-07 PROCEDURE — 85025 COMPLETE CBC W/AUTO DIFF WBC: CPT

## 2023-10-07 PROCEDURE — 36415 COLL VENOUS BLD VENIPUNCTURE: CPT

## 2023-10-07 PROCEDURE — 80053 COMPREHEN METABOLIC PANEL: CPT

## 2023-10-09 ENCOUNTER — OFFICE VISIT (OUTPATIENT)
Dept: HEMATOLOGY | Facility: CLINIC | Age: 73
End: 2023-10-09
Attending: INTERNAL MEDICINE
Payer: COMMERCIAL

## 2023-10-09 VITALS
WEIGHT: 157.8 LBS | DIASTOLIC BLOOD PRESSURE: 80 MMHG | HEART RATE: 89 BPM | TEMPERATURE: 96.1 F | SYSTOLIC BLOOD PRESSURE: 166 MMHG | BODY MASS INDEX: 22.32 KG/M2 | OXYGEN SATURATION: 100 %

## 2023-10-09 DIAGNOSIS — Z86.718 PERSONAL HISTORY OF VENOUS THROMBOSIS AND EMBOLISM: ICD-10-CM

## 2023-10-09 DIAGNOSIS — D47.1 CHRONIC MYELOPROLIFERATIVE DISORDER (H): Primary | ICD-10-CM

## 2023-10-09 DIAGNOSIS — Z79.01 CURRENT USE OF LONG TERM ANTICOAGULATION: ICD-10-CM

## 2023-10-09 PROCEDURE — 99214 OFFICE O/P EST MOD 30 MIN: CPT | Performed by: INTERNAL MEDICINE

## 2023-10-09 PROCEDURE — G0463 HOSPITAL OUTPT CLINIC VISIT: HCPCS | Performed by: INTERNAL MEDICINE

## 2023-10-09 NOTE — PROGRESS NOTES
[facility]  Center for Bleeding and Clotting Disorders  [facility] [address], [address], [place] [address]  Main: [phone], Fax: [phone]          Outpatient Clinic Visit  Date:  10/09/2023      Víctor Parker is a 73 year old man with a history of chronic myeloproliferative disorder (JAK2+) and left lower extremity DVT with bilateral PE in 2017, here today for routine follow-up.     Background history:  He presented in December 2017 with a left lower extremity deep vein thrombosis and bilateral pulmonary emboli.  These were initially felt to be provoked by prolonged car travel from [place] to [place].  However, soon thereafter, he was identified as having an underlying chronic myeloproliferative disorder (JAK2 +). Following diagnosis, he was placed on hydroxyurea and apixaban.      Interval History:   Víctor returns to clinic today for routine follow-up. He is accompanied by his wife, Tom. They will soon be leaving for [place], [place] where they spend the winter months.  Overall, he continues to feel well and remains very active, exercising daily.  He continues to take hydroxyurea and apixaban as prescribed.  He denies any bleeding or bruising symptoms.  No new symptoms or concerns.    Medications:  Hydroxyurea 1000 mg daily  Apixaban 5 mg twice daily    Exam:  He appears to be in good health.  He is afebrile, blood pressure 166/80 (typically elevated at clinic visits, but he checks daily at home and typically has readings of 120-130/80s.), pulse 89 and regular.  Respirations are unlabored with O2 saturation 100% on room air.  Weight is 157 pounds.  Lungs are clear.  RRR S1S2, no murmur.  No lower extremity edema.     Labs:  White count 15.6 (unremarkable differential), hemoglobin 14.3, hematocrit 42.5, platelets 337,000.  MCV is elevated at 120 (on hydroxyurea).  Comprehensive metabolic panel shows normal liver and renal function.      ASSESSMENT AND PLAN:   1.  Chronic myeloproliferative  disorder (JAK2 positive).   2.  History of left lower extremity deep vein thrombosis and bilateral pulmonary emboli  December 2017.      Víctor continues to do well on hydroxyurea and apixaban.  His CBC is stable.  We will make no change in his treatment plan.  We will plan to see him back in 6 months, sooner with new questions or concerns.    Total time 30 minutes, including review of medical records and labs, clinic visit, and documentation.      Kwan Esposito MD  Professor of Medicine  Division of Hematology, Oncology, and Transplantation  Director, [facility]

## 2024-04-18 DIAGNOSIS — Z79.01 CURRENT USE OF LONG TERM ANTICOAGULATION: ICD-10-CM

## 2024-04-18 DIAGNOSIS — D47.1 CHRONIC MYELOPROLIFERATIVE DISORDER (H): Primary | ICD-10-CM

## 2024-04-28 ENCOUNTER — LAB (OUTPATIENT)
Dept: LAB | Facility: CLINIC | Age: 74
End: 2024-04-28
Payer: COMMERCIAL

## 2024-04-28 DIAGNOSIS — D47.1 CHRONIC MYELOPROLIFERATIVE DISORDER (H): ICD-10-CM

## 2024-04-28 DIAGNOSIS — Z79.01 CURRENT USE OF LONG TERM ANTICOAGULATION: ICD-10-CM

## 2024-04-28 LAB
ALBUMIN SERPL BCG-MCNC: 3.7 G/DL (ref 3.5–5.2)
ALP SERPL-CCNC: 108 U/L (ref 40–150)
ALT SERPL W P-5'-P-CCNC: 7 U/L (ref 0–70)
ANION GAP SERPL CALCULATED.3IONS-SCNC: 10 MMOL/L (ref 7–15)
AST SERPL W P-5'-P-CCNC: 25 U/L (ref 0–45)
BILIRUB SERPL-MCNC: 0.6 MG/DL
BUN SERPL-MCNC: 21.9 MG/DL (ref 8–23)
CALCIUM SERPL-MCNC: 8.5 MG/DL (ref 8.8–10.2)
CHLORIDE SERPL-SCNC: 106 MMOL/L (ref 98–107)
CREAT SERPL-MCNC: 1.39 MG/DL (ref 0.67–1.17)
DEPRECATED HCO3 PLAS-SCNC: 22 MMOL/L (ref 22–29)
EGFRCR SERPLBLD CKD-EPI 2021: 53 ML/MIN/1.73M2
ERYTHROCYTE [DISTWIDTH] IN BLOOD BY AUTOMATED COUNT: 14.1 % (ref 10–15)
GLUCOSE SERPL-MCNC: 86 MG/DL (ref 70–99)
HCT VFR BLD AUTO: 40.9 % (ref 40–53)
HGB BLD-MCNC: 13.6 G/DL (ref 13.3–17.7)
MCH RBC QN AUTO: 41 PG (ref 26.5–33)
MCHC RBC AUTO-ENTMCNC: 33.3 G/DL (ref 31.5–36.5)
MCV RBC AUTO: 123 FL (ref 78–100)
PLATELET # BLD AUTO: 313 10E3/UL (ref 150–450)
POTASSIUM SERPL-SCNC: 4.4 MMOL/L (ref 3.4–5.3)
PROT SERPL-MCNC: 7.7 G/DL (ref 6.4–8.3)
RBC # BLD AUTO: 3.32 10E6/UL (ref 4.4–5.9)
SODIUM SERPL-SCNC: 138 MMOL/L (ref 135–145)
WBC # BLD AUTO: 23.8 10E3/UL (ref 4–11)

## 2024-04-28 PROCEDURE — 36415 COLL VENOUS BLD VENIPUNCTURE: CPT

## 2024-04-28 PROCEDURE — 80053 COMPREHEN METABOLIC PANEL: CPT

## 2024-04-28 PROCEDURE — 85027 COMPLETE CBC AUTOMATED: CPT

## 2024-04-29 ENCOUNTER — OFFICE VISIT (OUTPATIENT)
Dept: HEMATOLOGY | Facility: CLINIC | Age: 74
End: 2024-04-29
Attending: INTERNAL MEDICINE
Payer: COMMERCIAL

## 2024-04-29 VITALS
OXYGEN SATURATION: 100 % | BODY MASS INDEX: 22.82 KG/M2 | SYSTOLIC BLOOD PRESSURE: 140 MMHG | DIASTOLIC BLOOD PRESSURE: 82 MMHG | HEART RATE: 110 BPM | WEIGHT: 163 LBS | HEIGHT: 71 IN | TEMPERATURE: 98.2 F

## 2024-04-29 DIAGNOSIS — Z86.718 PERSONAL HISTORY OF VENOUS THROMBOSIS AND EMBOLISM: ICD-10-CM

## 2024-04-29 DIAGNOSIS — D47.1 CHRONIC MYELOPROLIFERATIVE DISORDER (H): Primary | ICD-10-CM

## 2024-04-29 DIAGNOSIS — Z79.01 CURRENT USE OF LONG TERM ANTICOAGULATION: ICD-10-CM

## 2024-04-29 DIAGNOSIS — D47.1 CHRONIC MYELOPROLIFERATIVE DISEASE (H): ICD-10-CM

## 2024-04-29 PROCEDURE — 99215 OFFICE O/P EST HI 40 MIN: CPT | Performed by: INTERNAL MEDICINE

## 2024-04-29 PROCEDURE — G0463 HOSPITAL OUTPT CLINIC VISIT: HCPCS | Performed by: INTERNAL MEDICINE

## 2024-04-29 RX ORDER — HYDROXYUREA 500 MG/1
500 CAPSULE ORAL 2 TIMES DAILY
Qty: 60 CAPSULE | Refills: 5 | Status: SHIPPED | OUTPATIENT
Start: 2024-04-29 | End: 2024-04-30

## 2024-04-29 NOTE — PROGRESS NOTES
[facility]  Center for Bleeding and Clotting Disorders  [facility] [address], [address], [place] [address]  Main: [phone], Fax: [phone]          Outpatient Clinic Visit  Date:  04/29/2024      Víctor Parker is a 73 year old man with a history of chronic myeloproliferative disorder (JAK2+) and left lower extremity DVT with bilateral PE in 2017, here today for routine follow-up.     Background history:  He presented in December 2017 with a left lower extremity deep vein thrombosis and bilateral pulmonary emboli.  These were initially felt to be provoked by prolonged car travel from [place] to [place].  However, soon thereafter, he was identified as having an underlying chronic myeloproliferative disorder (JAK2 +). Following diagnosis, he was placed on hydroxyurea and apixaban.      Interval History:   Víctor returns to clinic today for routine follow-up. He is accompanied by his wife, Tom.  They recently returned from [facility] where they spend the winter months.  Overall he continues to feel well and remains physically very active.  He exercises daily and enjoys spending time gardening.  Remains on hydroxyurea and apixaban without any difficulty.  No bleeding symptoms.  No new issues or concerns.    He noticed his white blood cell count was a bit higher on his labs today, and acknowledges that he has missed a few doses of hydroxyurea recently.  This is not due to side effects, but rather just being very busy and forgetting to take the second dose.    He is having cataract surgery in mid August and again in early September.  He needs some preoperative paperwork filled out, but this must be done within 30 days of the procedure, so we will have him return for that later this summer.    Medications:  Hydroxyurea 1000 mg daily  Apixaban 2.5 mg twice daily    Exam:  He appears to be in good health.  He is afebrile, blood pressure 140/82 (typically elevated at clinic visits, but he  checks daily at home and typically has readings of 120-130/80s.), pulse 110 and regular.  Respirations are unlabored with O2 saturation 100% on room air.  Weight is 163 pounds.  Lungs are clear.  RRR S1S2, no murmur.  Liver and spleen are not palpable.  No lower extremity edema.     Labs:  White count 23.8 (differential not done), hemoglobin 13.6, hematocrit 40.9, platelets 313,000.  MCV is elevated at 123 (on hydroxyurea).  His CBC parameters are within his baseline over the last 2 years.  Comprehensive metabolic panel shows a mildly elevated creatinine of 1.39.  This is not a new finding, and has been stable for at least the last 2 years).      ASSESSMENT AND PLAN:   1.  Chronic myeloproliferative disorder (JAK2 positive).   2.  History of left lower extremity deep vein thrombosis and bilateral pulmonary emboli (December 2017).      Overall, Víctor continues to do well on hydroxyurea and apixaban.  His CBC is stable.  We will make no change in his treatment plan.  We will plan to see him back in in late July, so that we can fill out the preoperative paperwork for his cataract surgery.  He will call with new questions or concerns in the    Total time on date of encounter 40 minutes, including review of medical records and labs, clinic visit, and documentation.        Kwan Esposito MD  Professor of Medicine  Division of Hematology, Oncology, and Transplantation  Director, [facility]ot[facility]

## 2024-04-30 ENCOUNTER — TELEPHONE (OUTPATIENT)
Dept: HEMATOLOGY | Facility: CLINIC | Age: 74
End: 2024-04-30
Payer: COMMERCIAL

## 2024-04-30 DIAGNOSIS — D47.1 CHRONIC MYELOPROLIFERATIVE DISEASE (H): ICD-10-CM

## 2024-04-30 RX ORDER — HYDROXYUREA 500 MG/1
500 CAPSULE ORAL 2 TIMES DAILY
Qty: 180 CAPSULE | Refills: 1 | Status: SHIPPED | OUTPATIENT
Start: 2024-04-30

## 2024-04-30 NOTE — TELEPHONE ENCOUNTER
Jose Luis called stating that he went to  his hydroxyurea today and found it was only a 30 day fill. Historically he has had 90 day fills. I checked with the nurse that filled his med yesterday who states it is OK to give 90 day fill. Order entered.    Matt VILLASEÑOR RN  [facility] Bleeding and Clotting Disorders  Office: [phone]  Clinic: [phone]  Fax: [phone]

## 2024-05-01 ENCOUNTER — TELEPHONE (OUTPATIENT)
Dept: HEMATOLOGY | Facility: CLINIC | Age: 74
End: 2024-05-01
Payer: COMMERCIAL

## 2024-05-01 NOTE — CONFIDENTIAL NOTE
[id number]  Jose Luis Parker  73 year old male  CBCD Diagnosis: Chronic Myeloproliferative Disorder  CBCD Provider: Cate    RN took a phone call from Jose Luis. He states he is scheduled to see Dr. Esposito on July 29th at 12:30 pm prior to a cataract surgery on August 15th for his pre-op physical. He is scheduled for a second surgery on September 5th. He called the surgical team and they will not accept the pre-op physical on 7/29 for both the 8/15 and 9/5 procedure. They will need him to undergo a second physical or a later scheduled one. Aiden Dahl wife is wondering if they should see Dr. Esposito on 7/29 and then again in late August. If so could that late August appointment count as his October appointment that he normally has scheduled.     RN will discuss with Dr. Esposito and call Jose Luis and Nabila back with guidance.     Sunita MACIEL, RN, N  [facility] for Bleeding and Clotting Disorders  Office: [phone]  Fax: [phone]    Addendum  Per Dr. Esposito, he would like to see Jose Luis back on 7/29 at 12:30 pm (1 hour return), no labs needed prior.  He will also see him on 8/20 at 9:00 am (30 min return). He will need labs the day prior (CBC and CMP) this can be completed at [place] if that is the patients preference. Lab orders placed. RN called and left a voicemail for Jose Luis and requested a call back to confirm plan.     Sunita MACIEL, RN, [facility] for Bleeding and Clotting Disorders  Office: [phone]  Fax: [phone]

## 2024-06-09 ENCOUNTER — HEALTH MAINTENANCE LETTER (OUTPATIENT)
Age: 74
End: 2024-06-09

## 2024-07-29 ENCOUNTER — OFFICE VISIT (OUTPATIENT)
Dept: HEMATOLOGY | Facility: CLINIC | Age: 74
End: 2024-07-29
Attending: INTERNAL MEDICINE
Payer: COMMERCIAL

## 2024-07-29 VITALS
HEART RATE: 80 BPM | HEIGHT: 70 IN | WEIGHT: 156.5 LBS | TEMPERATURE: 98.2 F | OXYGEN SATURATION: 99 % | DIASTOLIC BLOOD PRESSURE: 74 MMHG | BODY MASS INDEX: 22.41 KG/M2 | SYSTOLIC BLOOD PRESSURE: 159 MMHG

## 2024-07-29 DIAGNOSIS — Z86.718 PERSONAL HISTORY OF VENOUS THROMBOSIS AND EMBOLISM: ICD-10-CM

## 2024-07-29 DIAGNOSIS — D47.1 CHRONIC MYELOPROLIFERATIVE DISORDER (H): ICD-10-CM

## 2024-07-29 DIAGNOSIS — Z79.01 CURRENT USE OF LONG TERM ANTICOAGULATION: ICD-10-CM

## 2024-07-29 LAB
ALBUMIN SERPL BCG-MCNC: 4 G/DL (ref 3.5–5.2)
ALP SERPL-CCNC: 89 U/L (ref 40–150)
ALT SERPL W P-5'-P-CCNC: 11 U/L (ref 0–70)
ANION GAP SERPL CALCULATED.3IONS-SCNC: 11 MMOL/L (ref 7–15)
AST SERPL W P-5'-P-CCNC: 18 U/L (ref 0–45)
BASOPHILS # BLD AUTO: 0.1 10E3/UL (ref 0–0.2)
BASOPHILS NFR BLD AUTO: 1 %
BILIRUB SERPL-MCNC: 0.9 MG/DL
BUN SERPL-MCNC: 28.6 MG/DL (ref 8–23)
CALCIUM SERPL-MCNC: 9.3 MG/DL (ref 8.8–10.4)
CHLORIDE SERPL-SCNC: 102 MMOL/L (ref 98–107)
CREAT SERPL-MCNC: 1.56 MG/DL (ref 0.67–1.17)
EGFRCR SERPLBLD CKD-EPI 2021: 46 ML/MIN/1.73M2
EOSINOPHIL # BLD AUTO: 0.2 10E3/UL (ref 0–0.7)
EOSINOPHIL NFR BLD AUTO: 1 %
ERYTHROCYTE [DISTWIDTH] IN BLOOD BY AUTOMATED COUNT: 14.6 % (ref 10–15)
GLUCOSE SERPL-MCNC: 84 MG/DL (ref 70–99)
HCO3 SERPL-SCNC: 22 MMOL/L (ref 22–29)
HCT VFR BLD AUTO: 39.9 % (ref 40–53)
HGB BLD-MCNC: 13.8 G/DL (ref 13.3–17.7)
IMM GRANULOCYTES # BLD: 0.1 10E3/UL
IMM GRANULOCYTES NFR BLD: 1 %
LYMPHOCYTES # BLD AUTO: 1.6 10E3/UL (ref 0.8–5.3)
LYMPHOCYTES NFR BLD AUTO: 9 %
MCH RBC QN AUTO: 41.4 PG (ref 26.5–33)
MCHC RBC AUTO-ENTMCNC: 34.6 G/DL (ref 31.5–36.5)
MCV RBC AUTO: 120 FL (ref 78–100)
MONOCYTES # BLD AUTO: 0.4 10E3/UL (ref 0–1.3)
MONOCYTES NFR BLD AUTO: 2 %
NEUTROPHILS # BLD AUTO: 15.5 10E3/UL (ref 1.6–8.3)
NEUTROPHILS NFR BLD AUTO: 86 %
NRBC # BLD AUTO: 0 10E3/UL
NRBC BLD AUTO-RTO: 0 /100
PLATELET # BLD AUTO: 279 10E3/UL (ref 150–450)
POTASSIUM SERPL-SCNC: 4.7 MMOL/L (ref 3.4–5.3)
PROT SERPL-MCNC: 8.4 G/DL (ref 6.4–8.3)
RBC # BLD AUTO: 3.33 10E6/UL (ref 4.4–5.9)
SODIUM SERPL-SCNC: 135 MMOL/L (ref 135–145)
WBC # BLD AUTO: 17.8 10E3/UL (ref 4–11)

## 2024-07-29 PROCEDURE — 36415 COLL VENOUS BLD VENIPUNCTURE: CPT | Performed by: INTERNAL MEDICINE

## 2024-07-29 PROCEDURE — 84075 ASSAY ALKALINE PHOSPHATASE: CPT | Performed by: INTERNAL MEDICINE

## 2024-07-29 PROCEDURE — 99215 OFFICE O/P EST HI 40 MIN: CPT | Performed by: INTERNAL MEDICINE

## 2024-07-29 PROCEDURE — 85025 COMPLETE CBC W/AUTO DIFF WBC: CPT | Performed by: INTERNAL MEDICINE

## 2024-07-29 PROCEDURE — G0463 HOSPITAL OUTPT CLINIC VISIT: HCPCS | Performed by: INTERNAL MEDICINE

## 2024-07-29 PROCEDURE — 84155 ASSAY OF PROTEIN SERUM: CPT | Performed by: INTERNAL MEDICINE

## 2024-07-29 NOTE — PROGRESS NOTES
[facility]  Center for Bleeding and Clotting Disorders  [address], [address], [place] [address]  Main: [phone], Fax: [phone]          Outpatient Clinic Visit  Date:  07/29/2024      Víctor Parker is a 73 year old man with a history of chronic myeloproliferative disorder (JAK2+) and left lower extremity DVT with bilateral PE in 2017, here today for routine follow-up.  He is scheduled for cataract surgery and also needs preoperative paperwork filled out.    Background history:  He presented in December 2017 with a left lower extremity deep vein thrombosis and bilateral pulmonary emboli.  These were initially felt to be provoked by prolonged car travel from [place] to [place].  However, soon thereafter, he was identified as having an underlying chronic myeloproliferative disorder (JAK2 +). Following diagnosis, he was placed on hydroxyurea and apixaban.      Interval History:   Víctor is here today for preoperative evaluation prior to bilateral cataract surgery.  He has the first procedure scheduled for 8/21/2024 and the follow-up procedure on 9/5/2024.  This will be done at the [facility] eye surgery center in [place].    He continues to feel well and remains physically very active.  He exercises daily and enjoys spending time gardening.  Remains on hydroxyurea and apixaban without any difficulty.  No bleeding symptoms.  He has no history of heart or lung disease.  He is not diabetic.  He is a non-smoker.  He has no chronic active medical problems other than those for which we are following him here.    Medications:  Hydroxyurea 1000 mg daily  Apixaban 2.5 mg twice daily   Daily multivitamin      Exam:  He appears to be in good health.  He is afebrile, blood pressure 159/74 here, but recent home readings are lower, in the 114-132/58-81 range.  Pulse is 80 and regular.  Respirations are unlabored with O2 saturation 99% on room air.  Weight is 156 pounds.  Lungs are clear.  RRR S1S2,  no murmur.  Liver and spleen are not palpable.  No lower extremity edema.     Labs:  Labs from today are pending.  Most recent labs from April 2024 were unremarkable, with liver and renal function, and CBC all stable and within his baseline.      ASSESSMENT AND PLAN:   1.  Chronic myeloproliferative disorder (JAK2 positive).   2.  History of left lower extremity deep vein thrombosis and bilateral pulmonary emboli (December 2017).      Overall, Víctor continues to do well on hydroxyurea and apixaban.  His CBC is stable.  He has had no bleeding issues and remains appropriate for long-term anticoagulation.    From our perspective, there is no contraindication to the upcoming cataract surgeries.  Given that this is a bloodless surgery, he does not need to hold his anticoagulation.  He should continue take his medications as scheduled, unless otherwise directed by the eye clinic.  We will fax a copy of our office note from today to the eye clinic for their reference.    Will plan to see him back later this fall, prior to his return trip to [place] for the winter.  In the meantime, he will call with new questions or concerns.    Total time on date of encounter 40 minutes, including review of medical records and labs, clinic visit, and documentation.        Kwan Esposito MD  Professor of Medicine  Division of Hematology, Oncology, and Transplantation  Director, Center [facility] Ble[facility] Clotting Disorders      ADDENDUM:  Labs from 7/29/2024:  Serum electrolytes normal, creatinine mildly elevated at 1.56 (not new) LFTs normal.  White count 17.8, hemoglobin 13.8, , platelets 279,000.  The CBC parameters are all within his baseline.  Leukocytosis is stable and is related to his underlying bone marrow disorder.  MCV is also stable and is elevated because he is taking hydroxyurea.    MTR.

## 2024-07-30 NOTE — PROGRESS NOTES
Note faxed to [facility] #[phone]    Precious CROFTN, RN, PHN   [facility] for Bleeding and Clotting Disorders   Office: [phone]  Fax: [phone]

## 2024-09-09 ENCOUNTER — TELEPHONE (OUTPATIENT)
Dept: HEMATOLOGY | Facility: CLINIC | Age: 74
End: 2024-09-09
Payer: COMMERCIAL

## 2024-09-09 NOTE — TELEPHONE ENCOUNTER
Jose Luis spoke with his opthalmology team. They do not think the back pain is related to Diamox. Jose Luis reiterated that he is looking for PCP recs from Dr. Esposito. I told him we would follow up with him. Encouraged him to go to urgent care if his sx worsen. He states he is getting around just fine as of now.    Addendum 9/10/2024 4:24 PM:  RN called patient back and let him know Dr. Esposito's recommendations which include seeing a PCP that he gets along with that is local to him. Gave him [facility] phone number. He will call them to inquire about making PCP appt.     His back pain has improved and cataract surgery went well. RN updated Dr. Esposito.      Virgie Funes RN, BSN, PCCN  Nurse Clinician    [facility] for Bleeding and Clotting Disorders  [address] [place] [address], [address], [place] [address]   Office, direct: [phone]  Main office number: [phone]  Pronouns: She, her, hers

## 2024-09-09 NOTE — TELEPHONE ENCOUNTER
[id number]  Jose Luis Parker  73 year old male  CBCD Diagnosis: JAK2, Hx of DVT  CBCD Provider: Dr. Esposito    Call received from Jose Luis looking to ask Dr. Esposito a few questions.    Jose Luis states that he had the 2nd of two cataract surgeries on 9/5/24. After his procedure he was given Diamox. The following day he had some back pain that has since improved. He wants to know if Dr. Esposito is familiar with this medication and had thoughts on it. Staff informed Jose Luis that diamox (Acetazolamide) is a dieretic since it was given to him by his ophthalmology team, we would recommend that he follow up with them regarding questions about side effects or symptoms post procedure.    Jose Luis also states that he does not have a PCP. He is interested in getting established with one and is wondering if Dr. Esposito has any recommendations. Plan made to review this question with Dr. Esposito and staff will get back to Jose Luis in the coming week or so.    Precious Iqbal  BSN, RN, PHN   [facility] for Bleeding and Clotting Disorders   Office: [phone]  Fax: [phone]

## 2024-09-23 ENCOUNTER — OFFICE VISIT (OUTPATIENT)
Dept: URGENT CARE | Facility: URGENT CARE | Age: 74
End: 2024-09-23
Payer: COMMERCIAL

## 2024-09-23 ENCOUNTER — OFFICE VISIT (OUTPATIENT)
Dept: PEDIATRICS | Facility: CLINIC | Age: 74
End: 2024-09-23
Attending: NURSE PRACTITIONER
Payer: COMMERCIAL

## 2024-09-23 ENCOUNTER — ANCILLARY PROCEDURE (OUTPATIENT)
Dept: ULTRASOUND IMAGING | Facility: CLINIC | Age: 74
End: 2024-09-23
Attending: INTERNAL MEDICINE
Payer: COMMERCIAL

## 2024-09-23 VITALS
HEIGHT: 70 IN | WEIGHT: 156 LBS | HEART RATE: 77 BPM | OXYGEN SATURATION: 100 % | SYSTOLIC BLOOD PRESSURE: 143 MMHG | BODY MASS INDEX: 22.33 KG/M2 | TEMPERATURE: 97.6 F | DIASTOLIC BLOOD PRESSURE: 79 MMHG | RESPIRATION RATE: 14 BRPM

## 2024-09-23 VITALS
OXYGEN SATURATION: 99 % | RESPIRATION RATE: 20 BRPM | SYSTOLIC BLOOD PRESSURE: 165 MMHG | HEART RATE: 87 BPM | TEMPERATURE: 97.6 F | DIASTOLIC BLOOD PRESSURE: 84 MMHG

## 2024-09-23 DIAGNOSIS — N17.9 AKI (ACUTE KIDNEY INJURY) (H): ICD-10-CM

## 2024-09-23 DIAGNOSIS — Z86.718 HISTORY OF DEEP VENOUS THROMBOSIS: ICD-10-CM

## 2024-09-23 DIAGNOSIS — M79.89 PAIN AND SWELLING OF RIGHT LOWER EXTREMITY: Primary | ICD-10-CM

## 2024-09-23 DIAGNOSIS — D47.1 CHRONIC MYELOPROLIFERATIVE DISORDER (H): ICD-10-CM

## 2024-09-23 DIAGNOSIS — R60.0 EDEMA OF RIGHT LOWER EXTREMITY: Primary | ICD-10-CM

## 2024-09-23 DIAGNOSIS — R60.0 EDEMA OF RIGHT LOWER EXTREMITY: ICD-10-CM

## 2024-09-23 DIAGNOSIS — M79.604 PAIN AND SWELLING OF RIGHT LOWER EXTREMITY: Primary | ICD-10-CM

## 2024-09-23 DIAGNOSIS — Z86.711 HISTORY OF PULMONARY EMBOLISM: ICD-10-CM

## 2024-09-23 LAB
ANION GAP SERPL CALCULATED.3IONS-SCNC: 10 MMOL/L (ref 7–15)
BUN SERPL-MCNC: 23.7 MG/DL (ref 8–23)
CALCIUM SERPL-MCNC: 8.9 MG/DL (ref 8.8–10.4)
CHLORIDE SERPL-SCNC: 102 MMOL/L (ref 98–107)
CREAT SERPL-MCNC: 1.34 MG/DL (ref 0.67–1.17)
EGFRCR SERPLBLD CKD-EPI 2021: 56 ML/MIN/1.73M2
GLUCOSE SERPL-MCNC: 97 MG/DL (ref 70–99)
HCO3 SERPL-SCNC: 22 MMOL/L (ref 22–29)
POTASSIUM SERPL-SCNC: 4.7 MMOL/L (ref 3.4–5.3)
SODIUM SERPL-SCNC: 134 MMOL/L (ref 135–145)

## 2024-09-23 PROCEDURE — 93971 EXTREMITY STUDY: CPT | Mod: RT

## 2024-09-23 PROCEDURE — 80048 BASIC METABOLIC PNL TOTAL CA: CPT | Performed by: INTERNAL MEDICINE

## 2024-09-23 PROCEDURE — 36415 COLL VENOUS BLD VENIPUNCTURE: CPT | Performed by: INTERNAL MEDICINE

## 2024-09-23 PROCEDURE — 99204 OFFICE O/P NEW MOD 45 MIN: CPT | Performed by: INTERNAL MEDICINE

## 2024-09-23 PROCEDURE — 99207 PR FIRST ORDER ACUTE REFERRAL: CPT | Performed by: NURSE PRACTITIONER

## 2024-09-23 ASSESSMENT — PAIN SCALES - GENERAL: PAINLEVEL: MODERATE PAIN (5)

## 2024-09-23 NOTE — PATIENT INSTRUCTIONS
Your ultrasound shows a ruptured baker's cyst. I recommend elevating the leg to help with swelling and returning to activity as able.

## 2024-09-23 NOTE — PROGRESS NOTES
Assessment & Plan         Diagnosis Comments   1. Pain and swelling of right lower extremity  Referral to Acute and Diagnostic Services (Day of diagnostic / First order acute) Discussed the next best step for the patient would be to obtain an US to rule out a DVT. The patient is in agreement and will report to the [facility].   [place] established patient with concerning history for blood clotting  Report given to provider at [facility] with concerns of DVT due to history and presentation  Patient denies, CXP, SOB, H/A  Patient states he will drive directly to [facility] from [facility] we discussed not eating may drink water.      2. Chronic myeloproliferative disorder (H)  Referral to Acute and Diagnostic Services (Day of diagnostic / First order acute) History of myeloproliferation disorder. Followed by hematology      3. History of deep venous thrombosis  Referral to Acute and Diagnostic Services (Day of diagnostic / First order acute) History of DVT in 2017 on Abixaban      4. History of pulmonary embolism  Referral to Acute and Diagnostic Services (Day of diagnostic / First order acute) History of Bilateral PE in 2017 on Abixaban            Sindy Peña, MSN, FNP Student on 9/23/2024 at 1:26 PM     I saw and evaluated the patient and agree with the findings and plan of care as documented in the note.    Regla Gao [facility] [place]    Keny Amaya is a 73 year old male who presents to clinic today for the following health issues:  Chief Complaint   Patient presents with    Musculoskeletal Problem     Behind Rt knee swelling x 2-3 days. Poss strain. Tender, no warm/hot to touch, little pain but tolerable when walking behind Rt knee. No tingling or numbing sensation at toes         9/23/2024     1:08 PM   Additional Questions   Roomed by Rosanna Suarez MA   Accompanied by Spouse     HPI  Patient is here in urgent care today with a sore swollen right calf. He states he was shoveling dirt  yesterday and using his leg to press the shove into the ground. He then noticed that his calf was swollen and he is having pain in the popliteal region and radiates to his right calf. Has a history of DVT and PE, Myeloproliferative disorder follows hematology. Denies chest pain, shortness of breath, headache      Review of Systems  Constitutional, HEENT, cardiovascular, pulmonary, gi and gu systems are negative, except as otherwise noted.      Objective    BP (!) 165/84   Pulse 87   Temp 97.6  F (36.4  C) (Tympanic)   Resp 20   SpO2 99%   Physical Exam   GENERAL: alert and no distress  RESP: lungs clear to auscultation - no rales, rhonchi or wheezes  CV: regular rate and rhythm, normal S1 S2, no S3 or S4, no murmur, click or rub, no peripheral edema  MS: no gross musculoskeletal defects noted, right calf swelling measuring 8cm left calf measures 5cm. Right 1 cm abrasion on anterior RLE Pedal pulse +2 CMS intact, no palpable nodules, pain with palpation in right upper calf and popliteal region. Normal gait.  SKIN: no suspicious lesions or rashes  NEURO: Normal strength and tone of BLE

## 2024-09-23 NOTE — PROGRESS NOTES
"Acute and Diagnostic Services Clinic Visit    Assessment & Plan     Edema of right lower extremity  With history of DVT and recent subtherapeutic dosing of apixaban, he has significant risk of DVT. US negative for thrombuis and suggestive of ruptured baker's cyst. Discussed this diagnosis and recommend return to normal activities along with elevation as needed for swelling at the knee.     - US Lower Extremity Venous Duplex Right; Future  - Basic metabolic panel; Future  - Basic metabolic panel      LILLIAM:     Creatinine was elevated on his regular labs one month ago. He was likely slightly dehydrated at the time. Rechecked today with moderate improvement in his creatinine.                Return in about 2 weeks (around 10/7/2024), or if symptoms worsen or fail to improve, for follow up with PCP.    Keny Amaya is a 73 year old, presenting for the following health issues:  Deep Vein Thrombosis (Behind Right Knee and Calf)    HPI     He has pain and swelling in the right lower leg and knee. The swelling started 2-3 days ago and has been staying the same. Yesteday was not bothering him but he does have some discomfort today primarily located posterior to the right knee.     He has a history of DVT and has an rx of apixaban but had been taking a lower dose recently of his own accord. Has been doin gthe lower dose for \"quite a while.\"    Evaluation for possible DVT  Onset/Duration: A couple days ago   Description:       Location: Behind Right Knee, Calf        Redness: no        Pain: 5/10       Warmth: no        Joint swelling no   Progression of symptoms same  Accompanying signs and symptoms:       Fevers: no        Numbness/tingling/weakness: no        Chest pain/pleurisy: no        Shortness of breath: no   History        Trauma: YES- Hit Leg while gardening.         Recent travel/when: no         Previous history of DVT: YES- 2017        Family history of DVT: no         Recent surgery: YES- Cataract surgery " "  Aggravating factors include: walking  Therapies tried and outcome: nothing  Prior surgery on arteries of veins in this area: No              Objective    BP (!) 143/79 (BP Location: Right arm, Patient Position: Sitting, Cuff Size: Adult Regular)   Pulse 77   Temp 97.6  F (36.4  C) (Oral)   Resp 14   Ht 1.778 m (5' 10\")   Wt 70.8 kg (156 lb)   SpO2 100%   BMI 22.38 kg/m    Body mass index is 22.38 kg/m .  Physical Exam               Signed Electronically by: Jenn Shay MD    "

## 2024-10-15 ENCOUNTER — OFFICE VISIT (OUTPATIENT)
Dept: URGENT CARE | Facility: URGENT CARE | Age: 74
End: 2024-10-15
Payer: COMMERCIAL

## 2024-10-15 VITALS
HEART RATE: 96 BPM | BODY MASS INDEX: 22.38 KG/M2 | DIASTOLIC BLOOD PRESSURE: 82 MMHG | OXYGEN SATURATION: 95 % | WEIGHT: 156 LBS | SYSTOLIC BLOOD PRESSURE: 150 MMHG | TEMPERATURE: 99.9 F | RESPIRATION RATE: 20 BRPM

## 2024-10-15 DIAGNOSIS — R05.1 ACUTE COUGH: Primary | ICD-10-CM

## 2024-10-15 DIAGNOSIS — R07.81 RIB PAIN ON LEFT SIDE: ICD-10-CM

## 2024-10-15 PROCEDURE — 87635 SARS-COV-2 COVID-19 AMP PRB: CPT | Performed by: INTERNAL MEDICINE

## 2024-10-15 PROCEDURE — 99214 OFFICE O/P EST MOD 30 MIN: CPT | Performed by: INTERNAL MEDICINE

## 2024-10-15 RX ORDER — BENZONATATE 100 MG/1
100 CAPSULE ORAL 3 TIMES DAILY PRN
Qty: 30 CAPSULE | Refills: 0 | Status: SHIPPED | OUTPATIENT
Start: 2024-10-15 | End: 2024-10-28

## 2024-10-15 ASSESSMENT — PAIN SCALES - GENERAL: PAINLEVEL: MILD PAIN (3)

## 2024-10-15 NOTE — PROGRESS NOTES
SUBJECTIVE:  Jose Luis Parker is an 74 year old male who presents for not feeling well.  Last week was spreading some old bags of mulch and the next day had a temp of 102.  Took some tylenol which helped.  No fevers since then.  After the fever improved developed a cough.  Felt like cough was congested but difficult to get phlegm up.  Had mucinex which helped.  Cough improved some.  Has some right lower rib pain if moved the wrong way or coughs over past couple days.   Not have cough now but will try to clear some phlegm in throat.  No nasal congestion.  Not check home covid test. No n/v/d.  Eating okay.  No ear pain.  No throat pain.  No falls or injuries.    PMH:  Patient Active Problem List   Diagnosis    History of deep venous thrombosis    History of pulmonary embolism    Chronic myeloproliferative disorder (H)     Social History     Socioeconomic History    Marital status:      Spouse name: None    Number of children: None    Years of education: None    Highest education level: None   Tobacco Use    Smoking status: Never    Smokeless tobacco: Never     No family history on file.    ALLERGIES:  Levofloxacin    Current Outpatient Medications   Medication Sig Dispense Refill    apixaban ANTICOAGULANT (ELIQUIS) 2.5 MG tablet Take 2.5 mg by mouth 2 times daily      hydroxyurea (HYDREA) 500 MG capsule Take 1 capsule (500 mg) by mouth 2 times daily 180 capsule 1    Multiple Vitamin (MULTIVITAMINS PO) Take 1 tablet by mouth daily      tamsulosin (FLOMAX) 0.4 MG capsule Take 0.4 mg by mouth as needed       No current facility-administered medications for this visit.         ROS:  ROS is done and is negative for general/constitutional, eye, ENT, Respiratory, cardiovascular, GI, , Skin, musculoskeletal except as noted elsewhere.  All other review of systems negative except as noted elsewhere.      OBJECTIVE:  BP (!) 150/82   Pulse 96   Temp 99.9  F (37.7  C) (Tympanic)   Resp 20   Wt 70.8 kg (156 lb)    SpO2 95%   BMI 22.38 kg/m    GENERAL APPEARANCE: Alert, in no acute distress  EYES: normal  EARS: External ears normal. Canals clear. TM's normal.  NOSE:moderate clear discharge and mildly inflamed mucosa  OROPHARYNX:normal  NECK:No adenopathy,masses or thyromegaly  RESP: normal and clear to auscultation  CV:regular rate and rhythm and no murmurs, clicks, or gallops  ABDOMEN: Abdomen soft, non-tender. BS normal. No masses, organomegaly  SKIN: no ulcers, lesions or rash  MUSCULOSKELETAL:Musculoskeletal normal      RESULTS  No results found for any visits on 10/15/24..  No results found for this or any previous visit (from the past 48 hour(s)).    ASSESSMENT/PLAN:    ASSESSMENT / PLAN:  (R05.1) Acute cough  (primary encounter diagnosis)  Comment: currently most c/with viral etiology.  Will check for covid  Plan: Symptomatic COVID-19 Virus (Coronavirus) by PCR        Nose, benzonatate (TESSALON) 100 MG capsule        Reviewed medication instructions and side effects. Follow up if experiences side effects.. I reviewed supportive care, otc meds to use if needed, expected course, and signs of concern.  Follow up as needed or if does not improve within 1 week(s) or if worsens in any way.  Reviewed red flag symptoms and is to go to the ER if experiences any of these.    (R07.81) Rib pain on left side  Comment: currently c/with muscular strain, likely from combination of lifting bags of mulch and coughing  Plan: I reviewed supportive care, otc meds to use if needed, expected course, and signs of concern.  Follow up as needed or if does not improve within 1 week(s) or if worsens in any way.  Reviewed red flag symptoms and is to go to the ER if experiences any of these.    See [facility] for orders, medications, letters, patient instructions  Time spent: 30 minutes spent during visit, reviewing test results, chart review, and charting on day of encounter  Eulalia Velazquez M.D.

## 2024-10-16 LAB — SARS-COV-2 RNA RESP QL NAA+PROBE: NEGATIVE

## 2024-10-18 ENCOUNTER — ANCILLARY PROCEDURE (OUTPATIENT)
Dept: GENERAL RADIOLOGY | Facility: CLINIC | Age: 74
End: 2024-10-18
Attending: FAMILY MEDICINE
Payer: COMMERCIAL

## 2024-10-18 ENCOUNTER — TELEPHONE (OUTPATIENT)
Dept: SCHEDULING | Facility: CLINIC | Age: 74
End: 2024-10-18

## 2024-10-18 ENCOUNTER — OFFICE VISIT (OUTPATIENT)
Dept: FAMILY MEDICINE | Facility: CLINIC | Age: 74
End: 2024-10-18
Payer: COMMERCIAL

## 2024-10-18 VITALS
DIASTOLIC BLOOD PRESSURE: 78 MMHG | HEIGHT: 69 IN | WEIGHT: 156 LBS | OXYGEN SATURATION: 95 % | SYSTOLIC BLOOD PRESSURE: 147 MMHG | BODY MASS INDEX: 23.11 KG/M2 | TEMPERATURE: 97.9 F | HEART RATE: 105 BPM | RESPIRATION RATE: 20 BRPM

## 2024-10-18 DIAGNOSIS — R05.1 ACUTE COUGH: Primary | ICD-10-CM

## 2024-10-18 DIAGNOSIS — R05.1 ACUTE COUGH: ICD-10-CM

## 2024-10-18 DIAGNOSIS — Z86.711 HISTORY OF PULMONARY EMBOLISM: ICD-10-CM

## 2024-10-18 DIAGNOSIS — R07.81 RIB PAIN ON RIGHT SIDE: ICD-10-CM

## 2024-10-18 PROCEDURE — 99214 OFFICE O/P EST MOD 30 MIN: CPT | Performed by: FAMILY MEDICINE

## 2024-10-18 PROCEDURE — 71101 X-RAY EXAM UNILAT RIBS/CHEST: CPT | Mod: TC | Performed by: RADIOLOGY

## 2024-10-18 NOTE — PROGRESS NOTES
"SUBJECTIVE:  Jose Luis Parker, a 74 year old male scheduled an appointment to discuss the following issues:  Follow-up urgent care 10/15 for cough. Negative covid. No xray done. Given tessalon perles. Non-smoker. History PE/DVT and CLL. Taking epliquis.   No sick contacts. No history pneumonia or bronchitis. No MDI. No ALLERGIC RHINITIS in past.   Past week. Fevers on/off past week. No chills.   Productive cough. Right lower rib pain -sharping. No rashes noted. Might be after cough. No nausea, vomiting or diarrhea or black/bloody stools. No urine changes or hematuria.   No kidney stones.   Pain overall improving. Was positionally and worse after cough.  Productive green cough. No history sinus infection.   Mild sinus congestion. Some fatigue. Fluids ok.   Runner in past.   Her with wife.    Medical, social, surgical, and family histories reviewed.    ROS:      OBJECTIVE:  BP (!) 147/78   Pulse 105   Temp 97.9  F (36.6  C) (Tympanic)   Resp 20   Ht 1.753 m (5' 9\")   Wt 70.8 kg (156 lb)   SpO2 95%   BMI 23.04 kg/m    EXAM:  GENERAL APPEARANCE: healthy, alert and no distress  EYES: EOMI,  PERRL  HENT: ear canals and TM's normal and nose and mouth without ulcers or lesions  RESP: good overall airlfow but some crackles right lung base. No wheezing  CV: regular rates and rhythm, normal S1 S2, no S3 or S4 and no murmur, click or rub -  ABDOMEN:  soft, nontender, no HSM or masses and bowel sounds normal  MS: extremities normal- no gross deformities noted, no evidence of inflammation in joints, FROM in all extremities.  MS: mild tenderness right lower ribs  SKIN: no suspicious lesions or rashes  NEURO: Normal strength and tone, sensory exam grossly normal, mentation intact and speech normal  PSYCH: mentation appears normal and affect normal/bright    ASSESSMENT / PLAN:  (R05.1) Acute cough  (primary encounter diagnosis)  Comment: overall more productive but less fatigue and fevers improving. Likely viral " pneumonia  Plan: amoxicillin-clavulanate (AUGMENTIN) 875-125 MG         tablet, XR Ribs & Chest Right G/E 3 Views        HOLD augmentin. Will await rad report but take if not continue improve overall by early next week or if worse or fevers or chills/fatigue reoccur. To ER if worseing pain/shortness of breath    (R07.81) Rib pain on right side  Comment: strain vs pneumonia  Plan: XR Ribs & Chest Right G/E 3 Views        Heatingpain/tylenol. Er if worse. Ct if persists.     (Z86.711) History of pulmonary embolism  Comment: on blood thinner. No leg swelling  Plan: Expected course and warning signs reviewed. To ER if wrosening shortness of breath/pain. Ct if persists. Expected course and warning signs reviewed. Call/email with questions/concerns      Jean Pierre Peng MD      Answers submitted by the patient for this visit:  General Questionnaire (Submitted on 10/18/2024)  Chief Complaint: Chronic problems general questions HPI Form  What is the reason for your visit today? : congestion cough fever  How many servings of fruits and vegetables do you eat daily?: 4 or more  On average, how many sweetened beverages do you drink each day (Examples: soda, juice, sweet tea, etc.  Do NOT count diet or artificially sweetened beverages)?: 0  How many minutes a day do you exercise enough to make your heart beat faster?: 60 or more  How many days a week do you exercise enough to make your heart beat faster?: 6  How many days per week do you miss taking your medication?: 0

## 2024-10-18 NOTE — TELEPHONE ENCOUNTER
Called and informed patient that I sent this in a [employer] message.Akosua HOOD [facility]

## 2024-10-18 NOTE — TELEPHONE ENCOUNTER
General Call      Reason for Call:  avs    What are your questions or concerns:  patient did not get an after visit summary at his appt today. Please email it to [email or web address]    Date of last appointment with provider: 10-18-24    Could we send this information to you in [employer] or would you prefer to receive a phone call?:   Patient would prefer a phone call   Okay to leave a detailed message?: Yes [phone]

## 2024-10-20 ENCOUNTER — NURSE TRIAGE (OUTPATIENT)
Dept: NURSING | Facility: CLINIC | Age: 74
End: 2024-10-20
Payer: COMMERCIAL

## 2024-10-20 NOTE — TELEPHONE ENCOUNTER
Nurse Triage SBAR    Is this a 2nd Level Triage? NO    Situation:  Fever while on abx    Background:  Pt and his spouse on the line, consent given. Pt reports his symptoms started as a bad cough on Monday last wk. Pt reports he would get rib pain from the coughing. He was seen in clinic on Friday and was started on Amoxicillin. Reports his CXR showed small pneumonia and pleural effusion. He reports he has still been continuing to get fevers on and off but his cough has improved slightly and seems less productive.    Assessment:  Pt reports he has a productive cough & is still running fevers on and off. States the fevers haven't gotten worse but they haven't completely gone away. Reports his temp right now is 101.1.     Protocol Recommended Disposition:   Call PCP Within 24 Hours    Recommendation:  Advised calling PCP within 24 hrs to discuss continued fever while on abx & to also mention the provider put in his note further imaging may be needed if symptoms persist. Protocol and care advice reviewed. Advised to call back with any new or worsening signs, symptoms, concerns, or questions. They verbalized understanding and agreed to follow advice given.       Reason for Disposition   [1] Taking antibiotic > 48 hours (2 days) AND [2] fever still present (SAME)    Additional Information   Negative: SEVERE difficulty breathing (e.g., struggling for each breath, speaks in single words)   Negative: Sounds like a life-threatening emergency to the triager   Negative: MODERATE difficulty breathing (e.g., speaks in phrases, SOB even at rest, pulse 100-120)   Negative: Fever > 103 F (39.4 C)   Negative: Patient sounds very sick or weak to the triager   Negative: [1] Taking antibiotics > 24 hours AND [2] symptoms WORSE   Negative: [1] Recent hospitalization AND [2] symptoms WORSE   Negative: [1] Diabetes mellitus or weak immune system (e.g., HIV positive, cancer chemo, splenectomy, organ transplant, chronic steroids) AND [2]  new-onset of fever > 100.0 F (37.8 C)   Negative: [1] Caller has URGENT question AND [2] triager unable to answer question   Negative: [1] Taking antibiotic AND [2] new-onset of fever    Protocols used: Infection on Antibiotic Follow-up Call-A-[facility]

## 2024-10-21 ENCOUNTER — TELEPHONE (OUTPATIENT)
Dept: FAMILY MEDICINE | Facility: CLINIC | Age: 74
End: 2024-10-21
Payer: COMMERCIAL

## 2024-10-21 NOTE — TELEPHONE ENCOUNTER
Pt notified of provider message as written. Pt verbalized good understanding. Pt will continue to monitor at home. Pt reports that he is taking it easy but making sure that he gets up and moves around. No additional questions at this time.     Debbi Floyd, GUERDAN, RN

## 2024-10-21 NOTE — TELEPHONE ENCOUNTER
Patient should start his antibiotic as given and will take 3-4 days to fully start working.  Will need repeat xray/exam in a month but should be seen at end of week (any provider ok) if not improving overall. If worse to er. Jean Pierre Peng MD

## 2024-10-21 NOTE — TELEPHONE ENCOUNTER
"Patient's wife is calling to schedule an apt with Danish Greenfield.    Patient was triaged over the weekend by FNA for fever and was advised to call the clinic today.    Today temp is 98.9.  Patient still has right side chest pain.    Writer advised to visit urgent care today if fever spikes.  \"Urgent care doesn't do anything.\"    SAME DAY  is available tomorrow.  Requesting to be scheduled tomorrow.  Can patient be scheduled tomorrow?    Please review and advise when able.  Paulina Navas RN    "

## 2024-10-23 ENCOUNTER — TELEPHONE (OUTPATIENT)
Dept: HEMATOLOGY | Facility: CLINIC | Age: 74
End: 2024-10-23
Payer: COMMERCIAL

## 2024-10-23 DIAGNOSIS — Z86.718 PERSONAL HISTORY OF VENOUS THROMBOSIS AND EMBOLISM: ICD-10-CM

## 2024-10-23 DIAGNOSIS — D47.1 CHRONIC MYELOPROLIFERATIVE DISEASE (H): ICD-10-CM

## 2024-10-23 DIAGNOSIS — Z79.01 CURRENT USE OF LONG TERM ANTICOAGULATION: ICD-10-CM

## 2024-10-23 DIAGNOSIS — D47.1 CHRONIC MYELOPROLIFERATIVE DISORDER (H): Primary | ICD-10-CM

## 2024-10-23 NOTE — TELEPHONE ENCOUNTER
Per routing comments sent to care team:    Virgie Funes, RN  Jean Pierre Peng MD; [facility]13 minutes ago (1:41 PM)     FYI. Update on a patient who your team has been in contact with the past couple weeks regarding pneumonia/cough.    Thanks,    Virgie Funes RN, BSN, PCCN  Nurse Clinician    [facility]  [address] [place] [address], [facility] [address], [place] [address]  Office, direct: [phone]  Main office number: [phone]  Pronouns: She, her, hers       Routing to PCP to update.

## 2024-10-23 NOTE — TELEPHONE ENCOUNTER
"[id number]  Jose Luis Parker  74 year old male  CBCD Diagnosis:JAK2+ and VTE  CBCD Provider: Cate    Incoming call from Nabila, Jose Luis's spouse, and Jose Luis. Jose Luis had a cough start about two weeks ago. On 10/15, he had a sharp pain in his right side (just below rib cage). Based on the intensity of pain and new fever, he went to urgent care on 10/15 for fever and cough. He was advised at that appointment to rest and take cough medication as needed.     On 10/18, symptoms persisted and he saw Dr. Peng. Dr. Peng had an X-ray completed which showed small pleural effusion and some pneumonia. Jose Luis was prescribed a 10 day course of Augmentin.    Today, patient and spouse call to check in and see if Dr. Esposito would like any other action taken such as a blood draw. Jose Luis has a history of VTE and chronic myeloproliferative disorder (JAK2+).     Jose Luis had a slightly elevated temperature of 99F this AM, he took some tylenol and temp has been normal since then.   He has a mild right side ache below rib cage (this has improved since 10/15). He denies any chest pain. He does feel slightly short of breath when \"running up the stairs.\" Otherwise, can walk around the house without any trouble breathing. He is comfortable at rest. He has an occasional productive cough. He feels his heart rate is slightly higher than usual because he is not exercising. He has not missed any days of anticoagulation.    RN reviewed symptoms with Dr. Esposito. Dr. Esposito agrees with PCP recommendations to continue course of Augmentin and rest. He should get a CBC completed when he is feeling better and before he lives to winter in [place]. Order placed. Nabila and patient will set that up.    Reviewed when to go to emergency department:  shortness of breath during rest and everyday activity, chest pain, worsening fever, etc.     Nabila and Jose Luis understand and will see PCP on 10/31 as scheduled.    Virgie Funes RN, BSN, PCCN  Nurse " Johann [facility]AMOR[facility]  [address] [place] [address], [address], [place] [address]   Office, direct: [phone]  Main office number: [phone]  Pronouns: She, her, hers

## 2024-10-27 ENCOUNTER — OFFICE VISIT (OUTPATIENT)
Dept: URGENT CARE | Facility: URGENT CARE | Age: 74
End: 2024-10-27
Payer: COMMERCIAL

## 2024-10-27 ENCOUNTER — ANCILLARY PROCEDURE (OUTPATIENT)
Dept: GENERAL RADIOLOGY | Facility: CLINIC | Age: 74
End: 2024-10-27
Payer: COMMERCIAL

## 2024-10-27 VITALS
OXYGEN SATURATION: 95 % | HEART RATE: 94 BPM | TEMPERATURE: 99.1 F | BODY MASS INDEX: 21.27 KG/M2 | WEIGHT: 144 LBS | DIASTOLIC BLOOD PRESSURE: 79 MMHG | SYSTOLIC BLOOD PRESSURE: 152 MMHG | RESPIRATION RATE: 22 BRPM

## 2024-10-27 DIAGNOSIS — Z87.01 HISTORY OF PNEUMONIA: ICD-10-CM

## 2024-10-27 DIAGNOSIS — R05.1 ACUTE COUGH: Primary | ICD-10-CM

## 2024-10-27 LAB — RADIOLOGIST FLAGS: NORMAL

## 2024-10-27 PROCEDURE — 71046 X-RAY EXAM CHEST 2 VIEWS: CPT | Mod: TC | Performed by: RADIOLOGY

## 2024-10-27 PROCEDURE — 99214 OFFICE O/P EST MOD 30 MIN: CPT

## 2024-10-27 NOTE — PROGRESS NOTES
ASSESSMENT:  (R05.1) Acute cough  (primary encounter diagnosis)  Plan: XR Chest 2 Views, Referral to Acute and         Diagnostic Services (Day of diagnostic / First         order acute)    (Z87.01) History of pneumonia  Plan: XR Chest 2 Views, Referral to Acute and         Diagnostic Services (Day of diagnostic / First         order acute)    PLAN:  Informed the patient that the chest x-ray shows the following per the radiologist report: Persistent, indeterminate opacity at the medial right lung base. Again, CT imaging is recommended for further assessment.     Recommend left interstitial prominence is slightly free of the previous examination and may be attributable to a mild pulmonary vascular congestion pattern. Small right pleural effusion. No pneumothorax.     Heart size cannot be adequately assessed, due to opacity involving the medial right lung base. Attention on chest CT follow-up.     Age-indeterminate, mild midthoracic compression deformity.    We discussed the need to follow-up at the [facility] diagnostic services clinic tomorrow due to the need for chest CT for further diagnosis and/or treatment.  Informed the patient to avoid eating or drinking prior to their arrival.  We discussed contacting the number on the after visit summary if he does not hear from the staff prior to 10 AM.  Patient acknowledged his understanding of the above plan.    The use of Dragon/[employer] dictation services may have been used to construct the content in this note; any grammatical or spelling errors are non-intentional. Please contact the author of this note directly if you are in need of any clarification.      GLORIA Call CNP      SUBJECTIVE:  Jose Luis Parker is a 74 year old male who presents to the clinic today with a chief complaint of cough  for 10 day(s).  His cough is described as slightly productive.    The patient's symptoms are mild and improving.  Associated symptoms include fever.  The patient's symptoms are exacerbated by no particular triggers  Patient has been using finished antibiotics for pneumonia to improve symptoms.    ROS  Negative except noted above.     OBJECTIVE:  BP (!) 152/79   Pulse 94   Temp 99.1  F (37.3  C) (Tympanic)   Resp 22   Wt 65.3 kg (144 lb)   SpO2 95%   BMI 21.27 kg/m    GENERAL APPEARANCE: healthy, alert and no distress  EYES: EOMI,  PERRL, conjunctiva clear  HENT: nose and mouth without erythema, ulcers or lesions and oral mucous membranes moist, no erythema noted  NECK: supple, nontender, no lymphadenopathy  RESP: lungs clear to auscultation - no rales, rhonchi or wheezes  CV: regular rates and rhythm, normal S1 S2, no murmur noted  SKIN: no suspicious lesions or rashes    X-RAY chest x-ray shows the following per the radiologist report:: Persistent, indeterminate opacity at the medial right lung base. Again, CT imaging is recommended for further assessment.     Recommend left interstitial prominence is slightly free of the previous examination and may be attributable to a mild pulmonary vascular congestion pattern. Small right pleural effusion. No pneumothorax.     Heart size cannot be adequately assessed, due to opacity involving the medial right lung base. Attention on chest CT follow-up.     Age-indeterminate, mild midthoracic compression deformity.

## 2024-10-28 ENCOUNTER — TELEPHONE (OUTPATIENT)
Dept: URGENT CARE | Facility: URGENT CARE | Age: 74
End: 2024-10-28
Payer: COMMERCIAL

## 2024-10-28 ENCOUNTER — HOSPITAL ENCOUNTER (INPATIENT)
Facility: CLINIC | Age: 74
LOS: 8 days | Discharge: HOME | DRG: 177 | End: 2024-11-05
Attending: EMERGENCY MEDICINE | Admitting: STUDENT IN AN ORGANIZED HEALTH CARE EDUCATION/TRAINING PROGRAM
Payer: COMMERCIAL

## 2024-10-28 ENCOUNTER — ANCILLARY PROCEDURE (OUTPATIENT)
Dept: CT IMAGING | Facility: CLINIC | Age: 74
End: 2024-10-28
Attending: INTERNAL MEDICINE
Payer: COMMERCIAL

## 2024-10-28 ENCOUNTER — OFFICE VISIT (OUTPATIENT)
Dept: PEDIATRICS | Facility: CLINIC | Age: 74
End: 2024-10-28
Payer: COMMERCIAL

## 2024-10-28 VITALS
TEMPERATURE: 97.2 F | SYSTOLIC BLOOD PRESSURE: 136 MMHG | HEART RATE: 94 BPM | OXYGEN SATURATION: 99 % | RESPIRATION RATE: 19 BRPM | DIASTOLIC BLOOD PRESSURE: 75 MMHG

## 2024-10-28 DIAGNOSIS — R91.8 PULMONARY NODULES: ICD-10-CM

## 2024-10-28 DIAGNOSIS — R05.9 COUGH, UNSPECIFIED TYPE: ICD-10-CM

## 2024-10-28 DIAGNOSIS — J90 LOCULATED PLEURAL EFFUSION: ICD-10-CM

## 2024-10-28 DIAGNOSIS — J18.9 PNEUMONIA OF RIGHT LOWER LOBE DUE TO INFECTIOUS ORGANISM: ICD-10-CM

## 2024-10-28 DIAGNOSIS — D47.1 CHRONIC MYELOPROLIFERATIVE DISORDER (H): ICD-10-CM

## 2024-10-28 DIAGNOSIS — J86.9 EMPYEMA (H): Primary | ICD-10-CM

## 2024-10-28 DIAGNOSIS — Z79.01 LONG TERM (CURRENT) USE OF ANTICOAGULANTS: ICD-10-CM

## 2024-10-28 DIAGNOSIS — J18.9 PNEUMONIA OF RIGHT LOWER LOBE DUE TO INFECTIOUS ORGANISM: Primary | ICD-10-CM

## 2024-10-28 PROBLEM — N18.31 CHRONIC KIDNEY DISEASE, STAGE 3A (H): Status: ACTIVE | Noted: 2024-10-28

## 2024-10-28 LAB
ANION GAP SERPL CALCULATED.3IONS-SCNC: 12 MMOL/L (ref 7–15)
APTT PPP: 48 SECONDS (ref 22–38)
BASOPHILS # BLD AUTO: 0.3 10E3/UL (ref 0–0.2)
BASOPHILS NFR BLD AUTO: 1 %
BUN SERPL-MCNC: 21.1 MG/DL (ref 8–23)
CALCIUM SERPL-MCNC: 8.7 MG/DL (ref 8.8–10.4)
CHLORIDE SERPL-SCNC: 102 MMOL/L (ref 98–107)
CREAT SERPL-MCNC: 1.1 MG/DL (ref 0.67–1.17)
CRP SERPL-MCNC: 68.5 MG/L
EGFRCR SERPLBLD CKD-EPI 2021: 70 ML/MIN/1.73M2
EOSINOPHIL # BLD AUTO: 0.1 10E3/UL (ref 0–0.7)
EOSINOPHIL NFR BLD AUTO: 0 %
ERYTHROCYTE [DISTWIDTH] IN BLOOD BY AUTOMATED COUNT: 13.3 % (ref 10–15)
GLUCOSE SERPL-MCNC: 114 MG/DL (ref 70–99)
HCO3 SERPL-SCNC: 22 MMOL/L (ref 22–29)
HCT VFR BLD AUTO: 34.7 % (ref 40–53)
HGB BLD-MCNC: 11.7 G/DL (ref 13.3–17.7)
HOLD SPECIMEN: NORMAL
IMM GRANULOCYTES # BLD: 0.4 10E3/UL
IMM GRANULOCYTES NFR BLD: 2 %
INR PPP: 1.47 (ref 0.85–1.15)
LDH SERPL L TO P-CCNC: 143 U/L (ref 0–250)
LYMPHOCYTES # BLD AUTO: 1.6 10E3/UL (ref 0.8–5.3)
LYMPHOCYTES NFR BLD AUTO: 5 %
MCH RBC QN AUTO: 40.1 PG (ref 26.5–33)
MCHC RBC AUTO-ENTMCNC: 33.7 G/DL (ref 31.5–36.5)
MCV RBC AUTO: 119 FL (ref 78–100)
MONOCYTES # BLD AUTO: 0.9 10E3/UL (ref 0–1.3)
MONOCYTES NFR BLD AUTO: 3 %
NEUTROPHILS # BLD AUTO: 26.9 10E3/UL (ref 1.6–8.3)
NEUTROPHILS NFR BLD AUTO: 89 %
NEUTS HYPERSEG BLD QL SMEAR: PRESENT
NRBC # BLD AUTO: 0 10E3/UL
NRBC BLD AUTO-RTO: 0 /100
PLAT MORPH BLD: ABNORMAL
PLATELET # BLD AUTO: 463 10E3/UL (ref 150–450)
POTASSIUM SERPL-SCNC: 4.5 MMOL/L (ref 3.4–5.3)
PROT SERPL-MCNC: 7.7 G/DL (ref 6.4–8.3)
RBC # BLD AUTO: 2.92 10E6/UL (ref 4.4–5.9)
RBC MORPH BLD: ABNORMAL
SODIUM SERPL-SCNC: 136 MMOL/L (ref 135–145)
WBC # BLD AUTO: 30.3 10E3/UL (ref 4–11)

## 2024-10-28 PROCEDURE — 258N000003 HC RX IP 258 OP 636: Performed by: STUDENT IN AN ORGANIZED HEALTH CARE EDUCATION/TRAINING PROGRAM

## 2024-10-28 PROCEDURE — 99285 EMERGENCY DEPT VISIT HI MDM: CPT | Performed by: EMERGENCY MEDICINE

## 2024-10-28 PROCEDURE — 85025 COMPLETE CBC W/AUTO DIFF WBC: CPT | Performed by: INTERNAL MEDICINE

## 2024-10-28 PROCEDURE — 99215 OFFICE O/P EST HI 40 MIN: CPT | Performed by: INTERNAL MEDICINE

## 2024-10-28 PROCEDURE — 250N000013 HC RX MED GY IP 250 OP 250 PS 637

## 2024-10-28 PROCEDURE — 87040 BLOOD CULTURE FOR BACTERIA: CPT | Performed by: INTERNAL MEDICINE

## 2024-10-28 PROCEDURE — 99222 1ST HOSP IP/OBS MODERATE 55: CPT | Mod: GC | Performed by: STUDENT IN AN ORGANIZED HEALTH CARE EDUCATION/TRAINING PROGRAM

## 2024-10-28 PROCEDURE — 36415 COLL VENOUS BLD VENIPUNCTURE: CPT | Performed by: INTERNAL MEDICINE

## 2024-10-28 PROCEDURE — 83615 LACTATE (LD) (LDH) ENZYME: CPT

## 2024-10-28 PROCEDURE — 85730 THROMBOPLASTIN TIME PARTIAL: CPT

## 2024-10-28 PROCEDURE — 86140 C-REACTIVE PROTEIN: CPT | Performed by: INTERNAL MEDICINE

## 2024-10-28 PROCEDURE — 84155 ASSAY OF PROTEIN SERUM: CPT

## 2024-10-28 PROCEDURE — 250N000011 HC RX IP 250 OP 636: Performed by: EMERGENCY MEDICINE

## 2024-10-28 PROCEDURE — 36415 COLL VENOUS BLD VENIPUNCTURE: CPT

## 2024-10-28 PROCEDURE — 80048 BASIC METABOLIC PNL TOTAL CA: CPT | Performed by: INTERNAL MEDICINE

## 2024-10-28 PROCEDURE — 71275 CT ANGIOGRAPHY CHEST: CPT | Performed by: RADIOLOGY

## 2024-10-28 PROCEDURE — 250N000011 HC RX IP 250 OP 636: Performed by: STUDENT IN AN ORGANIZED HEALTH CARE EDUCATION/TRAINING PROGRAM

## 2024-10-28 PROCEDURE — 99285 EMERGENCY DEPT VISIT HI MDM: CPT | Mod: 25 | Performed by: EMERGENCY MEDICINE

## 2024-10-28 PROCEDURE — 120N000002 HC R&B MED SURG/OB UMMC

## 2024-10-28 PROCEDURE — 85610 PROTHROMBIN TIME: CPT

## 2024-10-28 RX ORDER — POLYETHYLENE GLYCOL 3350 17 G/17G
17 POWDER, FOR SOLUTION ORAL 2 TIMES DAILY PRN
Status: DISCONTINUED | OUTPATIENT
Start: 2024-10-28 | End: 2024-11-05 | Stop reason: HOSPADM

## 2024-10-28 RX ORDER — BISACODYL 10 MG
10 SUPPOSITORY, RECTAL RECTAL DAILY PRN
Status: DISCONTINUED | OUTPATIENT
Start: 2024-10-28 | End: 2024-11-05 | Stop reason: HOSPADM

## 2024-10-28 RX ORDER — BENZONATATE 100 MG/1
100 CAPSULE ORAL 3 TIMES DAILY PRN
Status: DISCONTINUED | OUTPATIENT
Start: 2024-10-28 | End: 2024-11-05 | Stop reason: HOSPADM

## 2024-10-28 RX ORDER — AMOXICILLIN 250 MG
1 CAPSULE ORAL 2 TIMES DAILY PRN
Status: DISCONTINUED | OUTPATIENT
Start: 2024-10-28 | End: 2024-11-05 | Stop reason: HOSPADM

## 2024-10-28 RX ORDER — IOPAMIDOL 755 MG/ML
58 INJECTION, SOLUTION INTRAVASCULAR ONCE
Status: COMPLETED | OUTPATIENT
Start: 2024-10-28 | End: 2024-10-28

## 2024-10-28 RX ORDER — LIDOCAINE 40 MG/G
CREAM TOPICAL
Status: DISCONTINUED | OUTPATIENT
Start: 2024-10-28 | End: 2024-11-05 | Stop reason: HOSPADM

## 2024-10-28 RX ORDER — AMOXICILLIN 250 MG
2 CAPSULE ORAL 2 TIMES DAILY PRN
Status: DISCONTINUED | OUTPATIENT
Start: 2024-10-28 | End: 2024-11-05 | Stop reason: HOSPADM

## 2024-10-28 RX ORDER — PIPERACILLIN SODIUM, TAZOBACTAM SODIUM 3; .375 G/15ML; G/15ML
3.38 INJECTION, POWDER, LYOPHILIZED, FOR SOLUTION INTRAVENOUS EVERY 6 HOURS
Status: DISCONTINUED | OUTPATIENT
Start: 2024-10-28 | End: 2024-11-05 | Stop reason: HOSPADM

## 2024-10-28 RX ORDER — GUAIFENESIN 200 MG/10ML
200 LIQUID ORAL EVERY 4 HOURS PRN
Status: DISCONTINUED | OUTPATIENT
Start: 2024-10-28 | End: 2024-11-05 | Stop reason: HOSPADM

## 2024-10-28 RX ORDER — HYDROXYUREA 500 MG/1
500 CAPSULE ORAL 2 TIMES DAILY
Status: DISCONTINUED | OUTPATIENT
Start: 2024-10-28 | End: 2024-11-05 | Stop reason: HOSPADM

## 2024-10-28 RX ORDER — ACETAMINOPHEN 650 MG/1
650 SUPPOSITORY RECTAL EVERY 4 HOURS PRN
Status: DISCONTINUED | OUTPATIENT
Start: 2024-10-28 | End: 2024-11-05 | Stop reason: HOSPADM

## 2024-10-28 RX ORDER — ONDANSETRON 4 MG/1
4 TABLET, ORALLY DISINTEGRATING ORAL EVERY 6 HOURS PRN
Status: DISCONTINUED | OUTPATIENT
Start: 2024-10-28 | End: 2024-11-05 | Stop reason: HOSPADM

## 2024-10-28 RX ORDER — PIPERACILLIN SODIUM, TAZOBACTAM SODIUM 3; .375 G/15ML; G/15ML
3.38 INJECTION, POWDER, LYOPHILIZED, FOR SOLUTION INTRAVENOUS EVERY 8 HOURS
Status: DISCONTINUED | OUTPATIENT
Start: 2024-10-29 | End: 2024-10-28

## 2024-10-28 RX ORDER — ACETAMINOPHEN 325 MG/1
650 TABLET ORAL EVERY 4 HOURS PRN
Status: DISCONTINUED | OUTPATIENT
Start: 2024-10-28 | End: 2024-11-05 | Stop reason: HOSPADM

## 2024-10-28 RX ORDER — CALCIUM CARBONATE 500 MG/1
1000 TABLET, CHEWABLE ORAL 4 TIMES DAILY PRN
Status: DISCONTINUED | OUTPATIENT
Start: 2024-10-28 | End: 2024-11-05 | Stop reason: HOSPADM

## 2024-10-28 RX ORDER — ONDANSETRON 2 MG/ML
4 INJECTION INTRAMUSCULAR; INTRAVENOUS EVERY 6 HOURS PRN
Status: DISCONTINUED | OUTPATIENT
Start: 2024-10-28 | End: 2024-11-05 | Stop reason: HOSPADM

## 2024-10-28 RX ADMIN — PIPERACILLIN SODIUM AND TAZOBACTAM SODIUM 4.5 G: 4; .5 INJECTION, SOLUTION INTRAVENOUS at 16:07

## 2024-10-28 RX ADMIN — ACETAMINOPHEN 650 MG: 325 TABLET, FILM COATED ORAL at 21:14

## 2024-10-28 RX ADMIN — IOPAMIDOL 58 ML: 755 INJECTION, SOLUTION INTRAVASCULAR at 11:06

## 2024-10-28 RX ADMIN — PIPERACILLIN AND TAZOBACTAM 3.38 G: 3; .375 INJECTION, POWDER, LYOPHILIZED, FOR SOLUTION INTRAVENOUS at 22:15

## 2024-10-28 RX ADMIN — VANCOMYCIN HYDROCHLORIDE 1500 MG: 10 INJECTION, POWDER, LYOPHILIZED, FOR SOLUTION INTRAVENOUS at 18:23

## 2024-10-28 ASSESSMENT — ACTIVITIES OF DAILY LIVING (ADL)
ADLS_ACUITY_SCORE: 0

## 2024-10-28 NOTE — PATIENT INSTRUCTIONS
Please go to the Emergency Room at [facility] at     [address] [facility]

## 2024-10-28 NOTE — TELEPHONE ENCOUNTER
Pt wife calling, pt is with her. Pt was seen yesterday and advised to have a CT today.   I reviewed ov note and pt was referred to [facility] at [place] today for evaluation and CT and was advised to not eat or drink before appointment.  Pt and wife notified of this information. Advised they do not open until 9:30 am. So they should be calling between now and 10 am.  Pt had eaten breakfast at 8 am, they did not remember the not eating or drinking instructions. Advised to just let them know he had eaten at 8 am today when they call and not to eat or drink again until after appointment.  They verbalize understanding.      I called [facility] and updated them with this information and they will call pt.  Niru CROFTN, RN

## 2024-10-28 NOTE — PHARMACY-VANCOMYCIN DOSING SERVICE
"Pharmacy Vancomycin Initial Note  Date of Service 2024  Patient's  1950  74 year old, male    Indication: Abscess    Current estimated CrCl = Estimated Creatinine Clearance: 53.6 mL/min (based on SCr of 1.1 mg/dL).    Creatinine for last 3 days  10/28/2024: 10:39 AM Creatinine 1.10 mg/dL    Recent Vancomycin Level(s) for last 3 days  No results found for requested labs within last 3 days.      Vancomycin IV Administrations (past 72 hours)        No vancomycin orders with administrations in past 72 hours.                    Nephrotoxins and other renal medications (From now, onward)      Start     Dose/Rate Route Frequency Ordered Stop    10/28/24 1800  vancomycin (VANCOCIN) 1,500 mg in 0.9% NaCl 265 mL intermittent infusion         1,500 mg  over 90 Minutes Intravenous EVERY 24 HOURS 10/28/24 1721      10/28/24 1645  piperacillin-tazobactam (ZOSYN) 3.375 g vial to attach to  mL bag        Note to Pharmacy: For [facility], [facility] and [facility]: For Zosyn-naive patients, use the \"Zosyn initial dose + extended infusion\" order panel.    3.375 g  over 30 Minutes Intravenous EVERY 8 HOURS 10/28/24 1640              Contrast Orders - past 72 hours (72h ago, onward)      None            [employer] Prediction of Planned Initial Vancomycin Regimen  Loading dose: N/A  Regimen: 1500 mg IV every 24 hours.  Start time: 17:19 on 10/28/2024  Exposure target: AUC24 (range)400-600 mg/L.hr   AUC24,ss: 575 mg/L.hr  Probability of AUC24 > 400: 87 %  Ctrough,ss: 16.8 mg/L  Probability of Ctrough,ss > 20: 32 %  Probability of nephrotoxicity (Lodise LUIS ): 12 %    Plan:  Start vancomycin  1500 mg IV q24h.   Vancomycin monitoring method: AUC  Vancomycin therapeutic monitoring goal: 400-600 mg*h/L  Pharmacy will check vancomycin levels as appropriate in 1-3 Days.    Serum creatinine levels will be ordered daily for the first week of therapy and at least twice weekly for subsequent weeks.      Jose Gonzalez [facility]   "

## 2024-10-28 NOTE — MEDICATION SCRIBE - ADMISSION MEDICATION HISTORY
Medication Scribe Admission Medication History    Admission medication history is complete. The information provided in this note is only as accurate as the sources available at the time of the update.    Information Source(s): Patient via in-person    Pertinent Information: Patient is no longer taking meds in deleted section.    Changes made to PTA medication list:  Added: None  Deleted: amoxicillin-clavulanate (AUGMENTIN) 875-125 MG tablet     Take 1 tablet by mouth 2 times daily for 10 days., benzonatate (TESSALON) 100 MG capsule     Take 1 capsule (100 mg) by mouth 3 times daily as needed for cough. tamsulosin (FLOMAX) 0.4 MG capsule     Take 0.4 mg by mouth as needed   Changed: None    Allergies reviewed with patient and updates made in EHR: yes  Medication History Completed By: Kristine Edmonds 10/28/2024 6:13 PM    PTA Med List   Medication Sig Last Dose/Taking    apixaban ANTICOAGULANT (ELIQUIS) 2.5 MG tablet Take 2.5 mg by mouth 2 times daily Morning    hydroxyurea (HYDREA) 500 MG capsule Take 1 capsule (500 mg) by mouth 2 times daily Morning    Multiple Vitamin (MULTIVITAMINS PO) Take 1 tablet by mouth daily 10/26/2024 Morning

## 2024-10-28 NOTE — ED PROVIDER NOTES
ED Provider Note  [facility]      History     Chief Complaint   Patient presents with    Cough     Pneumonia      The history is provided by the patient, medical records and the spouse.     Jose Luis Parker is a 74 year old male with a  history of chronic myeloproliferative disorder, LLE DVT and bilateral PE (2017) who presents to  ED for evaluation of a cough. Two weeks ago the patient began experiencing fever and a cough that persisted.  Patient returned to urgent care and was prescribed a 10-day course of Augmentin, 10 days ago.  With past several days the patient states his fever has improved and his cough has lessened.  Patient returned to urgent care yesterday where chest x-ray showed persistent indeterminant opacity at the medial right lung base.  Patient again returned to his PCP today for CT chest which was concerning for large subpulmonic, complex effusion in the right upper lung base; complete impression pasted below.       CT CHEST PE - [facility] (10/28/2024 - 11:16 AM)  IMPRESSION:  1.  There are 2 abnormalities in the right chest, presumed not related to each other.  2.  There is a suspicious low dense irregular tubular opacity in the right upper lobe that is of concern for a primary lung malignancy as noted above.  3.  There is a large subpulmonic, complex bilobed effusion in the right lung base with adjacent inflammatory stranding of the right paramediastinal fat, trace pericardial effusion dependently. Findings suspicious for an empyema.  4.  Compressive atelectasis of the adjacent right middle and lower lobes and trace loculated effusion posterolaterally in the right chest also noted.  5.  Retained secretions in the right calf mainstem bronchus with presumed reactive right hilar and subcarinal nodes.  6.  Minimal paraseptal emphysema in the upper lobes.  7.  Findings discussed by the undersigned with Dr. Shay at 1149.   8.  Patient needs further  evaluation by pulmonary and needs the right chest collection drained    Past Medical History  No past medical history on file.  No past surgical history on file.  amoxicillin-clavulanate (AUGMENTIN) 875-125 MG tablet  apixaban ANTICOAGULANT (ELIQUIS) 2.5 MG tablet  benzonatate (TESSALON) 100 MG capsule  hydroxyurea (HYDREA) 500 MG capsule  Multiple Vitamin (MULTIVITAMINS PO)  tamsulosin (FLOMAX) 0.4 MG capsule      Allergies   Allergen Reactions    Levofloxacin Muscle Pain (Myalgia)     Family History  No family history on file.  Social History   Social History     Tobacco Use    Smoking status: Never    Smokeless tobacco: Never   Vaping Use    Vaping status: Never Used      Past medical history, past surgical history, medications, allergies, family history, and social history were reviewed with the patient. No additional pertinent items.   A medically appropriate review of systems was performed with pertinent positives and negatives noted in the HPI, and all other systems negative.    Physical Exam      Physical Exam    Physical Exam   Constitutional: oriented to person, place, and time. appears well-developed and well-nourished.   HENT:   Head: Normocephalic and atraumatic.   Neck: Normal range of motion.   Pulmonary/Chest: Effort normal. No respiratory distress.  Diminished lung sounds at the right base  Cardiac: No murmurs, rubs, gallops. RRR.  Abdominal: Abdomen soft, nontender, nondistended. No rebound tenderness.  MSK: Long bones without deformity or evidence of trauma  Neurological: alert and oriented to person, place, and time.   Skin: Skin is warm and dry.   Psychiatric:  normal mood and affect.  behavior is normal. Thought content normal.     ED Course, Procedures, & Data      Procedures          Results for orders placed or performed in visit on 10/28/24   CT Chest Pulmonary Embolism w Contrast     Status: None    Narrative    EXAM: CT CHEST PULMONARY EMBOLISM W CONTRAST  LOCATION: [facility]  DATE: 10/28/2024    INDICATION:  persistent fevers and cough. Treated for pneumonia recently.    COMPARISON: Chest x-ray 10/27/2024 and 10/18/2024  TECHNIQUE: CT chest pulmonary angiogram during arterial phase injection of IV contrast. Multiplanar reformats and MIP reconstructions were performed. Dose reduction techniques were used.   CONTRAST: 58ML ISOVIEW 370    FINDINGS:  ANGIOGRAM CHEST: Excellent opacification of pulmonary arteries and branches. No evidence of pulmonary emboli. Aorta and branches are normal caliber.     LUNGS AND PLEURA: There is an irregular shaped, hypodense slightly tubular opacity in the right upper lobe (axial images 102-116, coronal images 40-43). This extends between the bronchovascular bundles and does not reflect inspissated mucus. This   measures at least at 3 cm in length and the dominant central component measures 1.6 x 1.3 cm.    There is a complex, bilobed, loculated subpulmonic effusion in the right base. This extends throughout the surface of the diaphragm and 10 cm in craniocaudal dimension. No air within this cavity. There is  adjacent atelectasis of the right middle and   lower lobes with air bronchograms along the periphery. There is a miniscule loculated right pleural effusion posterolaterally in the right lung base as well.    Paraseptal emphysema in the upper lobes. Retained secretions in the right mainstem bronchus. Other areas of discoid atelectasis in the lingula and right upper lobe.    MEDIASTINUM/AXILLAE: Thyroid is normal. Less than 1 cm subcarinal and right hilar nodes. Stranding extends to involve the right anterior paramediastinal fat with trace at inferior pericardial effusion.    CORONARY ARTERY CALCIFICATION: Mild.    UPPER ABDOMEN: There is a 1 cm subcapsular hypodense lesion in the right lobe of the liver with a density of 10 Hounsfield units suggestive of a cyst. Calcified granuloma in the spleen.     MUSCULOSKELETAL: No rib fractures  or suspicious bone lesions. There is a  compression fracture of the superior endplate of T8 with an adjacent Schmorl's node.      Impression    IMPRESSION:  1.  There are 2 abnormalities in the right chest, presumed not related to each other.  2.  There is a suspicious low dense irregular tubular opacity in the right upper lobe that is of concern for a primary lung malignancy as noted above.  3.  There is a large subpulmonic, complex bilobed effusion in the right lung base with adjacent inflammatory stranding of the right paramediastinal fat, trace pericardial effusion dependently. Findings suspicious for an empyema.  4.  Compressive atelectasis of the adjacent right middle and lower lobes and trace loculated effusion posterolaterally in the right chest also noted.  5.  Retained secretions in the right calf mainstem bronchus with presumed reactive right hilar and subcarinal nodes.  6.  Minimal paraseptal emphysema in the upper lobes.  7.  Findings discussed by the undersigned with Dr. Shay at 1149.   8.  Patient needs further evaluation by pulmonary and needs the right chest collection drained.   Results for orders placed or performed in visit on 10/28/24   Basic metabolic panel     Status: Abnormal   Result Value Ref Range    Sodium 136 135 - 145 mmol/L    Potassium 4.5 3.4 - 5.3 mmol/L    Chloride 102 98 - 107 mmol/L    Carbon Dioxide (CO2) 22 22 - 29 mmol/L    Anion Gap 12 7 - 15 mmol/L    Urea Nitrogen 21.1 8.0 - 23.0 mg/dL    Creatinine 1.10 0.67 - 1.17 mg/dL    GFR Estimate 70 >60 mL/min/1.73m2    Calcium 8.7 (L) 8.8 - 10.4 mg/dL    Glucose 114 (H) 70 - 99 mg/dL   CRP inflammation     Status: Abnormal   Result Value Ref Range    CRP Inflammation 68.50 (H) <5.00 mg/L   CBC with platelets and differential     Status: Abnormal   Result Value Ref Range    WBC Count 30.3 (HH) 4.0 - 11.0 10e3/uL    RBC Count 2.92 (L) 4.40 - 5.90 10e6/uL    Hemoglobin 11.7 (L) 13.3 - 17.7 g/dL    Hematocrit 34.7 (L) 40.0 - 53.0  %     (H) 78 - 100 fL    MCH 40.1 (H) 26.5 - 33.0 pg    MCHC 33.7 31.5 - 36.5 g/dL    RDW 13.3 10.0 - 15.0 %    Platelet Count 463 (H) 150 - 450 10e3/uL    % Neutrophils 89 %    % Lymphocytes 5 %    % Monocytes 3 %    % Eosinophils 0 %    % Basophils 1 %    % Immature Granulocytes 2 %    NRBCs per 100 WBC 0 <1 /100    Absolute Neutrophils 26.9 (H) 1.6 - 8.3 10e3/uL    Absolute Lymphocytes 1.6 0.8 - 5.3 10e3/uL    Absolute Monocytes 0.9 0.0 - 1.3 10e3/uL    Absolute Eosinophils 0.1 0.0 - 0.7 10e3/uL    Absolute Basophils 0.3 (H) 0.0 - 0.2 10e3/uL    Absolute Immature Granulocytes 0.4 <=0.4 10e3/uL    Absolute NRBCs 0.0 10e3/uL   RBC and Platelet Morphology     Status: Abnormal   Result Value Ref Range    RBC Morphology Confirmed RBC Indices     Platelet Assessment  Automated Count Confirmed. Platelet morphology is normal.     Automated Count Confirmed. Platelet morphology is normal.    Hypersegmented Neutrophils Present (A) None Seen   CBC with platelets differential     Status: Abnormal    Narrative    The following orders were created for panel order CBC with platelets differential.  Procedure                               Abnormality         Status                     ---------                               -----------         ------                     CBC with platelets and d...[[id number]]  Abnormal            Final result               RBC and Platelet Morphology[[id number]]  Abnormal            Final result                 Please view results for these tests on the individual orders.     Medications - No data to display  Labs Ordered and Resulted from Time of ED Arrival to Time of ED Departure - No data to display  No orders to display          Critical care was not performed.     Medical Decision Making  The patient's presentation was of high complexity (an acute health issue posing potential threat to life or bodily function).    The patient's evaluation involved:  review of external note(s) from 1  sources (reviewed primary care note from earlier today)  review of 3+ test result(s) ordered prior to this encounter (CBC, BMP, CT chest)  discussion of management or test interpretation with another health professional (hospitalist)    The patient's management necessitated high risk (a decision regarding hospitalization).    Assessment & Plan    MDM  Patient resenting with empyema.  He otherwise peers well with stable vitals.  He does appear to be mildly labored breathing but does not need oxygen.  Respiratory rate is normal.  He is given broad-spectrum antibiotics and will admit to the hospital service.  Blood cultures pending.    I have reviewed the nursing notes. I have reviewed the findings, diagnosis, plan and need for follow up with the patient.    New Prescriptions    No medications on file       Final diagnoses:   Empyema (H)     IJohn am serving as a trained medical scribe to document services personally performed by Marcus Rodriguez MD, based on the provider's statements to me.      IMarcus MD, was physically present and have reviewed and verified the accuracy of this note documented by John Chapa.    Marcus Rodriguez MD  [facility] EMERGENCY DEPARTMENT  10/28/2024     Marcus Rodriguez MD  10/28/24 [id number]

## 2024-10-28 NOTE — PATIENT INSTRUCTIONS
Chest x-ray shows the following per the radiologist report:   Persistent, indeterminate opacity at the medial right lung base. Again, CT imaging is recommended for further assessment.     Recommend left interstitial prominence is slightly free of the previous examination and may be attributable to a mild pulmonary vascular congestion pattern. Small right pleural effusion. No pneumothorax.     Heart size cannot be adequately assessed, due to opacity involving the medial right lung base. Attention on chest CT follow-up.     Age-indeterminate, mild midthoracic compression deformity.    Follow up at the [place] [facility] tomorrow.  Do not eat or drink prior to your arrival.  Call the number below if you do not hear from them prior to 10am.

## 2024-10-28 NOTE — PROGRESS NOTES
[facility] Visit    Assessment & Plan     Pneumonia of right lower lobe due to infectious organism  Loculated pleural effusion  Pulmonary nodules      With non-resolving fevers and abnormal xray, obtained CT scan and labs. Labs consistent with acute inflammatory response. 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 Also with mild anemia consistent with subacute inflammatory response.     CT results discussed with radiologist showing loculated subpulmonic effusion on the right concerning for infection. Also with a nodule in the RUL that will need follow up.     Discussed with patient that he will need hospital admission for IV antibiotics and likely drainage of the effusion. He agrees with this plan and will present to [facility] for care.    - Basic metabolic panel; Future  - CRP inflammation; Future  - CT Chest Pulmonary Embolism w Contrast; Future  - Blood Culture Peripheral Blood; Future  - CBC with platelets differential; Future  - Basic metabolic panel  - CRP inflammation  - Blood Culture Line, venous  - CBC with platelets differential                    No follow-ups on file.    Keny Amaya is a 74 year old, presenting for the following health issues:  Cough    HPI     Pt was dx with pneumonia on 10/15 and was prescribed treatment.  After completing treatment pt still has symptoms.  Pt was seen in  on 10/27 and had an x ray.  Radiologist recommended a CT for further  work up.       First sx on 10/12 was pain at lower right rib cage. Then developed cough and fever. Xray on 10/18 showed RLL consolidation, started on augmentin. Havin cough productive of green sputum and fevers decreasing in frequency on abx but not resolved. Last fever was yesterday.    Seen again in  yesterday and had repeat xray again showing consolidation with recommendation for CT to better characerize the abnormality.     I looked at the xrays  myself and agree it is hard to clearly delineate what is causin his sx.     He does not feel SOB but does have pain in the rigth lower rib cage limiting activity. Cough is much better but he does still occasionally cough.            Objective    /75 (BP Location: Right arm, Patient Position: Sitting, Cuff Size: Adult Regular)   Pulse 94   Temp 97.2  F (36.2  C) (Oral)   Resp 19   SpO2 99%   There is no height or weight on file to calculate BMI.  Physical Exam     Appears mildly ill, no acute distress  RRR, no murmur  CTAB with decreased lung sounds at the right base  Abd soft nt  LE without edema  No focal neuro deficits    Results for orders placed or performed in visit on 10/28/24 (from the past 24 hours)   Basic metabolic panel   Result Value Ref Range    Sodium 136 135 - 145 mmol/L    Potassium 4.5 3.4 - 5.3 mmol/L    Chloride 102 98 - 107 mmol/L    Carbon Dioxide (CO2) 22 22 - 29 mmol/L    Anion Gap 12 7 - 15 mmol/L    Urea Nitrogen 21.1 8.0 - 23.0 mg/dL    Creatinine 1.10 0.67 - 1.17 mg/dL    GFR Estimate 70 >60 mL/min/1.73m2    Calcium 8.7 (L) 8.8 - 10.4 mg/dL    Glucose 114 (H) 70 - 99 mg/dL   CRP inflammation   Result Value Ref Range    CRP Inflammation 68.50 (H) <5.00 mg/L   CBC with platelets differential    Narrative    The following orders were created for panel order CBC with platelets differential.  Procedure                               Abnormality         Status                     ---------                               -----------         ------                     CBC with platelets and d...[[id number]]  Abnormal            Final result               RBC and Platelet Morphology[[id number]]  Abnormal            Final result                 Please view results for these tests on the individual orders.   CBC with platelets and differential   Result Value Ref Range    WBC Count 30.3 (HH) 4.0 - 11.0 10e3/uL    RBC Count 2.92 (L) 4.40 - 5.90 10e6/uL    Hemoglobin 11.7 (L) 13.3 - 17.7 g/dL     Hematocrit 34.7 (L) 40.0 - 53.0 %     (H) 78 - 100 fL    MCH 40.1 (H) 26.5 - 33.0 pg    MCHC 33.7 31.5 - 36.5 g/dL    RDW 13.3 10.0 - 15.0 %    Platelet Count 463 (H) 150 - 450 10e3/uL    % Neutrophils 89 %    % Lymphocytes 5 %    % Monocytes 3 %    % Eosinophils 0 %    % Basophils 1 %    % Immature Granulocytes 2 %    NRBCs per 100 WBC 0 <1 /100    Absolute Neutrophils 26.9 (H) 1.6 - 8.3 10e3/uL    Absolute Lymphocytes 1.6 0.8 - 5.3 10e3/uL    Absolute Monocytes 0.9 0.0 - 1.3 10e3/uL    Absolute Eosinophils 0.1 0.0 - 0.7 10e3/uL    Absolute Basophils 0.3 (H) 0.0 - 0.2 10e3/uL    Absolute Immature Granulocytes 0.4 <=0.4 10e3/uL    Absolute NRBCs 0.0 10e3/uL   RBC and Platelet Morphology   Result Value Ref Range    RBC Morphology Confirmed RBC Indices     Platelet Assessment  Automated Count Confirmed. Platelet morphology is normal.     Automated Count Confirmed. Platelet morphology is normal.    Hypersegmented Neutrophils Present (A) None Seen           Signed Electronically by: Jenn Shay MD

## 2024-10-28 NOTE — H&P
[facility]    History and Physical - Medicine Service, [facility] TEAM 5       Date of Admission:  10/28/2024    Assessment & Plan      Jose Luis Parker is a 74 year old male with a  history of chronic myeloproliferative disorder, LLE DVT and bilateral PE (2017) who presents to  ED for evaluation of a cough. Two weeks ago the patient began experiencing fever and a cough that persisted.  Patient returned to urgent care and was prescribed a 10-day course of Augmentin, 10 days ago.  With past several days the patient states his fever has improved and his cough has lessened.  Patient returned to urgent care yesterday where chest x-ray showed persistent indeterminant opacity at the medial right lung base.  Patient again returned to his PCP today for CT chest which was concerning for large subpulmonic, complex effusion in the right upper lung base;       Acute problems  #Right lower lobe pneumonia  #Multilobed loculated right side pleural effusion  #Concern for primary lung malignancy  #Neutrophilic Leukocytosis   #Thrombocytosis, likely reactive  #Trace pericardial effusion  Patient had 2 weeks of fever and green mucoid cough recently treated with 10 days of Augmentin. He followed up in urgent care 10/27, where a chest xray showed persistent opacities in the right lung base. He was instructed to follow up the next day (10/28) for a chest CT for further evaluation, which showed multiple pathologic findings. There was a tubular opacity in the right upper lobe suspicious for a primary lung malignancy. There is also a complex, bilobed effusion in the right lung base, with inflammator stranding and trace pericardial effusion. There is also a trace loculated effusion posterolaterally in the right middle and lower lobes. WBC to 30k on admission, 1/4 SIRS. CRP 68.5  -IR, pulm consult, appreciate recs  -NPO at midnight, held PTA apixaban for likely chest tube placement  10/29  -Will likely need IR vs EBUS biopsy to further evaluate primary lung nodule  -Continue vanc -zosyn for broad spectrum coverage.   -Follow up blood and sputum cultures  -AM CBC. Admitted w/ WBC to 30k (ANC 26.9), CRP to 68.5       Chronic Problems  #Hx of myeloproliferative disorder-continue PTA hydroxyurea 500mg BID  #Hx of LLE and BL PE (2017)-held PTA apixaban for chest tube placement  #Macrocytic Anemia  Hypersegmented neutrophils present on 10/28 peripheral smear          Diet:  NPO  DVT Prophylaxis: Pneumatic Compression Devices  De La Cruz Catheter: Not present  Fluids: None  Lines: None     Cardiac Monitoring: None  Code Status:  Full    Clinically Significant Risk Factors Present on Admission                # Drug Induced Coagulation Defect: home medication list includes an anticoagulant medication                       Disposition Plan      Expected Discharge Date: 10/30/2024                The patient's care was discussed with the Attending Physician, Dr. Hayes .      Urbano Patricia MD  Medicine Service, [address]  Securely message with [employer] (more info)  Text page via [employer] Paging/Directory   See signed in provider for up to date coverage information  ______________________________________________________________________    Chief Complaint   R sided pleural effusion, loculated    History is obtained from the patient    History of Present Illness   Jose Luis Parker is a 74 year old male with a  history of chronic myeloproliferative disorder, LLE DVT and bilateral PE (2017) who presents to  ED for evaluation of a cough. Two weeks ago the patient began experiencing fever and a cough that persisted.  Patient returned to urgent care and was prescribed a 10-day course of Augmentin, 10 days ago.  With past several days the patient states his fever has improved and his cough has lessened.  Patient returned to urgent care yesterday where  chest x-ray showed persistent indeterminant opacity at the medial right lung base.  Patient again returned to his PCP today for CT chest which was concerning for large subpulmonic, complex effusion in the right upper lung base; complete impression pasted below.       Past Medical History    No past medical history on file.    Past Surgical History   No past surgical history on file.    Prior to Admission Medications   Prior to Admission Medications   Prescriptions Last Dose Informant Patient Reported? Taking?   Multiple Vitamin (MULTIVITAMINS PO)   Yes No   Sig: Take 1 tablet by mouth daily   amoxicillin-clavulanate (AUGMENTIN) 875-125 MG tablet   No No   Sig: Take 1 tablet by mouth 2 times daily for 10 days.   Patient not taking: Reported on 10/28/2024   apixaban ANTICOAGULANT (ELIQUIS) 2.5 MG tablet   Yes No   Sig: Take 2.5 mg by mouth 2 times daily   benzonatate (TESSALON) 100 MG capsule   No No   Sig: Take 1 capsule (100 mg) by mouth 3 times daily as needed for cough.   Patient not taking: Reported on 10/28/2024   hydroxyurea (HYDREA) 500 MG capsule   No No   Sig: Take 1 capsule (500 mg) by mouth 2 times daily   tamsulosin (FLOMAX) 0.4 MG capsule   Yes No   Sig: Take 0.4 mg by mouth as needed   Patient not taking: Reported on 10/28/2024      Facility-Administered Medications: None        Review of Systems    The 10 point Review of Systems is negative other than noted in the HPI or here.      Physical Exam   Vital Signs: Temp: 98.4  F (36.9  C) Temp src: Oral BP: (!) 157/77 Pulse: 74   Resp: 18 SpO2: 100 % O2 Device: None (Room air)    Weight: 0 lbs 0 oz    General: well appearing   HEENT   Cards: No murmurs, rubs, gallops. Regular rate and rhythm   Pulm: No crackles, wheezes, rales. Lungs clear to auscultation BL   Abd: Soft, nontender, nondistended, nontympanitic.   Extremeties: Radial, pedal, tibial pulses present. 5/5 strength upper/lower BL.  Skin: no rashes, lesions   Neuro: No focal deficits noted          Data     I have personally reviewed the following data over the past 24 hrs:    30.3 (HH)  \   11.7 (L)   / 463 (H)     136 102 21.1 /  114 (H)   4.5 22 1.10 \     Procal: N/A CRP: 68.50 (H) Lactic Acid: N/A

## 2024-10-28 NOTE — ED TRIAGE NOTES
Pt was referred by the clinic to come in the ED due to abnormal chest-Ray  showed pneumonia on right lower lobe due to infectious organism and chronic kidney disease stage 3.denied chest pain or fever.   BP (!) 157/77   Pulse 74   Temp 98.4  F (36.9  C) (Oral)   Resp 18   SpO2 100%

## 2024-10-29 ENCOUNTER — APPOINTMENT (OUTPATIENT)
Dept: CARDIOLOGY | Facility: CLINIC | Age: 74
DRG: 177 | End: 2024-10-29
Payer: COMMERCIAL

## 2024-10-29 LAB
ALBUMIN SERPL BCG-MCNC: 2.6 G/DL (ref 3.5–5.2)
ALP SERPL-CCNC: 168 U/L (ref 40–150)
ALT SERPL W P-5'-P-CCNC: 15 U/L (ref 0–70)
ANION GAP SERPL CALCULATED.3IONS-SCNC: 8 MMOL/L (ref 7–15)
AST SERPL W P-5'-P-CCNC: 11 U/L (ref 0–45)
BACTERIA SPT CULT: NORMAL
BASOPHILS # BLD AUTO: 0.3 10E3/UL (ref 0–0.2)
BASOPHILS NFR BLD AUTO: 1 %
BILIRUB SERPL-MCNC: 0.5 MG/DL
BUN SERPL-MCNC: 22.8 MG/DL (ref 8–23)
CALCIUM SERPL-MCNC: 8.1 MG/DL (ref 8.8–10.4)
CHLORIDE SERPL-SCNC: 105 MMOL/L (ref 98–107)
CREAT SERPL-MCNC: 1.41 MG/DL (ref 0.67–1.17)
EGFRCR SERPLBLD CKD-EPI 2021: 52 ML/MIN/1.73M2
EOSINOPHIL # BLD AUTO: 0.2 10E3/UL (ref 0–0.7)
EOSINOPHIL NFR BLD AUTO: 1 %
ERYTHROCYTE [DISTWIDTH] IN BLOOD BY AUTOMATED COUNT: 13.2 % (ref 10–15)
FOLATE SERPL-MCNC: 20.4 NG/ML (ref 4.6–34.8)
GLUCOSE SERPL-MCNC: 98 MG/DL (ref 70–99)
GRAM STAIN RESULT: NORMAL
HCO3 SERPL-SCNC: 22 MMOL/L (ref 22–29)
HCT VFR BLD AUTO: 29.9 % (ref 40–53)
HGB BLD-MCNC: 10 G/DL (ref 13.3–17.7)
IMM GRANULOCYTES # BLD: 0.5 10E3/UL
IMM GRANULOCYTES NFR BLD: 2 %
LDH SERPL L TO P-CCNC: 147 U/L (ref 0–250)
LVEF ECHO: NORMAL
LYMPHOCYTES # BLD AUTO: 1.7 10E3/UL (ref 0.8–5.3)
LYMPHOCYTES NFR BLD AUTO: 6 %
MCH RBC QN AUTO: 40.5 PG (ref 26.5–33)
MCHC RBC AUTO-ENTMCNC: 33.4 G/DL (ref 31.5–36.5)
MCV RBC AUTO: 121 FL (ref 78–100)
MONOCYTES # BLD AUTO: 0.7 10E3/UL (ref 0–1.3)
MONOCYTES NFR BLD AUTO: 2 %
MRSA DNA SPEC QL NAA+PROBE: NEGATIVE
NEUTROPHILS # BLD AUTO: 27.5 10E3/UL (ref 1.6–8.3)
NEUTROPHILS NFR BLD AUTO: 89 %
NRBC # BLD AUTO: 0 10E3/UL
NRBC BLD AUTO-RTO: 0 /100
PLATELET # BLD AUTO: 409 10E3/UL (ref 150–450)
POTASSIUM SERPL-SCNC: 3.9 MMOL/L (ref 3.4–5.3)
PROT SERPL-MCNC: 7.2 G/DL (ref 6.4–8.3)
PROT SERPL-MCNC: 7.3 G/DL (ref 6.4–8.3)
RBC # BLD AUTO: 2.47 10E6/UL (ref 4.4–5.9)
SA TARGET DNA: NEGATIVE
SODIUM SERPL-SCNC: 135 MMOL/L (ref 135–145)
VIT B12 SERPL-MCNC: 1476 PG/ML (ref 232–1245)
WBC # BLD AUTO: 30.9 10E3/UL (ref 4–11)

## 2024-10-29 PROCEDURE — 87641 MR-STAPH DNA AMP PROBE: CPT | Performed by: STUDENT IN AN ORGANIZED HEALTH CARE EDUCATION/TRAINING PROGRAM

## 2024-10-29 PROCEDURE — 99232 SBSQ HOSP IP/OBS MODERATE 35: CPT | Performed by: STUDENT IN AN ORGANIZED HEALTH CARE EDUCATION/TRAINING PROGRAM

## 2024-10-29 PROCEDURE — 85004 AUTOMATED DIFF WBC COUNT: CPT

## 2024-10-29 PROCEDURE — 250N000011 HC RX IP 250 OP 636: Performed by: STUDENT IN AN ORGANIZED HEALTH CARE EDUCATION/TRAINING PROGRAM

## 2024-10-29 PROCEDURE — 93306 TTE W/DOPPLER COMPLETE: CPT

## 2024-10-29 PROCEDURE — 84155 ASSAY OF PROTEIN SERUM: CPT | Performed by: STUDENT IN AN ORGANIZED HEALTH CARE EDUCATION/TRAINING PROGRAM

## 2024-10-29 PROCEDURE — 82607 VITAMIN B-12: CPT | Performed by: STUDENT IN AN ORGANIZED HEALTH CARE EDUCATION/TRAINING PROGRAM

## 2024-10-29 PROCEDURE — 84155 ASSAY OF PROTEIN SERUM: CPT

## 2024-10-29 PROCEDURE — 120N000002 HC R&B MED SURG/OB UMMC

## 2024-10-29 PROCEDURE — 36415 COLL VENOUS BLD VENIPUNCTURE: CPT | Performed by: STUDENT IN AN ORGANIZED HEALTH CARE EDUCATION/TRAINING PROGRAM

## 2024-10-29 PROCEDURE — 93306 TTE W/DOPPLER COMPLETE: CPT | Mod: 26 | Performed by: INTERNAL MEDICINE

## 2024-10-29 PROCEDURE — 250N000013 HC RX MED GY IP 250 OP 250 PS 637

## 2024-10-29 PROCEDURE — 83615 LACTATE (LD) (LDH) ENZYME: CPT | Performed by: STUDENT IN AN ORGANIZED HEALTH CARE EDUCATION/TRAINING PROGRAM

## 2024-10-29 PROCEDURE — 250N000013 HC RX MED GY IP 250 OP 250 PS 637: Performed by: STUDENT IN AN ORGANIZED HEALTH CARE EDUCATION/TRAINING PROGRAM

## 2024-10-29 PROCEDURE — 87070 CULTURE OTHR SPECIMN AEROBIC: CPT

## 2024-10-29 PROCEDURE — 82746 ASSAY OF FOLIC ACID SERUM: CPT | Performed by: STUDENT IN AN ORGANIZED HEALTH CARE EDUCATION/TRAINING PROGRAM

## 2024-10-29 PROCEDURE — 36415 COLL VENOUS BLD VENIPUNCTURE: CPT

## 2024-10-29 RX ORDER — VANCOMYCIN HYDROCHLORIDE 1 G/200ML
1000 INJECTION, SOLUTION INTRAVENOUS EVERY 24 HOURS
Status: DISCONTINUED | OUTPATIENT
Start: 2024-10-29 | End: 2024-10-29

## 2024-10-29 RX ADMIN — PIPERACILLIN AND TAZOBACTAM 3.38 G: 3; .375 INJECTION, POWDER, LYOPHILIZED, FOR SOLUTION INTRAVENOUS at 04:45

## 2024-10-29 RX ADMIN — APIXABAN 2.5 MG: 2.5 TABLET, FILM COATED ORAL at 19:50

## 2024-10-29 RX ADMIN — HYDROXYUREA 500 MG: 500 CAPSULE ORAL at 19:50

## 2024-10-29 RX ADMIN — HYDROXYUREA 500 MG: 500 CAPSULE ORAL at 09:15

## 2024-10-29 RX ADMIN — PIPERACILLIN AND TAZOBACTAM 3.38 G: 3; .375 INJECTION, POWDER, LYOPHILIZED, FOR SOLUTION INTRAVENOUS at 15:31

## 2024-10-29 RX ADMIN — PIPERACILLIN AND TAZOBACTAM 3.38 G: 3; .375 INJECTION, POWDER, LYOPHILIZED, FOR SOLUTION INTRAVENOUS at 09:15

## 2024-10-29 RX ADMIN — PIPERACILLIN AND TAZOBACTAM 3.38 G: 3; .375 INJECTION, POWDER, LYOPHILIZED, FOR SOLUTION INTRAVENOUS at 22:17

## 2024-10-29 NOTE — PROGRESS NOTES
"Shift 5960-2355    Goal Outcome Evaluation:       Plan of Care Reviewed With: patient     Overall Patient Progress: no change    Pt Aox4. On RA. Denies SOB and chest pain. Ind in room. Continent of bowel and bladder. NPO for thoracentesis. Skin WDL. CHG bath done x1 during shift. No acute events overnight.     /71 (BP Location: Right arm)   Pulse 80   Temp 97.9  F (36.6  C) (Oral)   Resp 18   Ht 1.778 m (5' 10\")   Wt 65.1 kg (143 lb 9.6 oz)   SpO2 100%   BMI 20.60 kg/m      Continue POC.  "

## 2024-10-29 NOTE — CONSULTS
"    Interventional Radiology  [facility] Consult Service Note  10/29/24   10:45 AM    Consult Requested: Chest tube placement.    Recommendations/Plan:    Patient will be added to IR schedule on 10/30/24 for right-sided CT-guided chest tube placement (12 or 14 Fr. With stopcock in circuit). Timing of procedure is TBD based on staffing/schedule and triage.    This is a 74 year old male with history of complex right pleural effusion vs. empyema. Patient's team requesting thoracentesis. Review of imaging (CT 10/28/24) demonstrates complex septated right-sided effusion with adjacent atelectasis and adhesion). Will plan for CT-guided chest tube placement into posterior component, with possible lytics administration by team.     Orders for diagnostics to be entered by requesting team.    Labs WNL for procedure.   Preprocedural orders entered, as well as orders for procedure.  Consent will be done prior to procedure.     Please contact the IR charge RN at [phone] for estimated time of procedure.     Case and imaging discussed with IR attending, Dr. Falcon. Recommendations were reviewed with requesting team.        Pertinent Imaging Reviewed: CT, 10/28/24.    Expected date of discharge:  TBD    Vitals:   /72 (BP Location: Right arm)   Pulse 93   Temp 98.7  F (37.1  C) (Oral)   Resp 18   Ht 1.778 m (5' 10\")   Wt 65.1 kg (143 lb 9.6 oz)   SpO2 100%   BMI 20.60 kg/m      Pertinent Labs:   Lab Results   Component Value Date    WBC 30.9 (H) 10/29/2024    WBC 30.3 (HH) 10/28/2024    WBC 17.8 (H) 07/29/2024    WBC 18.2 (H) 10/19/2020    WBC 14.9 (H) 10/21/2019    WBC 11.8 (H) 05/21/2019     Lab Results   Component Value Date    HGB 10.0 10/29/2024    HGB 11.7 10/28/2024    HGB 13.8 07/29/2024    HGB 13.4 10/19/2020    HGB 15.0 10/21/2019    HGB 14.6 05/21/2019     Lab Results   Component Value Date     10/29/2024     10/28/2024     07/29/2024     10/19/2020     " 10/21/2019     05/21/2019     Lab Results   Component Value Date    INR 1.47 (H) 10/28/2024    PTT 48 (H) 10/28/2024     Lab Results   Component Value Date    POTASSIUM 3.9 10/29/2024    POTASSIUM 3.9 04/08/2023    POTASSIUM 5.0 04/24/2018        COVID-19 Antibody Results, Testing for Immunity           No data to display              COVID-19 PCR Results          10/15/2024    11:44   COVID-19 PCR Results   SARS CoV2 PCR Negative        Marcus Iniguez PA-C  Interventional Radiology  Pager: [phone].

## 2024-10-29 NOTE — PROGRESS NOTES
[facility]    Medicine Progress Note - Hospitalist Service, GOLD TEAM 21    Date of Admission:  10/28/2024    Assessment & Plan   Jose Luis Parker is a 74 year old male with a  history of chronic myeloproliferative disorder, LLE DVT and bilateral PE (2017) who presents to  ED for evaluation of a cough. Two weeks ago the patient began experiencing fever and a cough that persisted.  Patient returned to urgent care and was prescribed a 10-day course of Augmentin, 10 days ago.  With past several days the patient states his fever has improved and his cough has lessened.  Patient returned to urgent care yesterday where chest x-ray showed persistent indeterminant opacity at the medial right lung base.  Patient again returned to his PCP today for CT chest which was concerning for large subpulmonic, complex effusion in the right upper lung base;     Changes today:  - discussed with IR and pulmonary - IR chest tube tomorrow with labs ordered  - regular diet  - discussed concern for malignancy with patient, ongoing planning pending chest tube placement and pulm/IR recs   - discussed with IR, will resume DOAC  -add on  b12/folate  - mrsa nares negative, will stop vanc     Acute problems  #Right lower lobe pneumonia  #Multilobed loculated right side pleural effusion  #Concern for primary lung malignancy  Patient had 2 weeks of fever and green mucoid cough recently treated with 10 days of Augmentin. He followed up in urgent care 10/27, where a chest xray showed persistent opacities in the right lung base. He was instructed to follow up the next day (10/28) for a chest CT for further evaluation, which showed multiple pathologic findings. There was a tubular opacity in the right upper lobe suspicious for a primary lung malignancy. There is also a complex, bilobed effusion in the right lung base, with inflammator stranding and trace pericardial effusion. There is also a trace  loculated effusion posterolaterally in the right middle and lower lobes.   -IR, pulm consult, appreciate recs  -Continue zosyn for broad spectrum coverage.   -Follow up blood and sputum cultures     Chronic Problems  #Hx of myeloproliferative disorder-continue PTA hydroxyurea 500mg BID and apixiban 2.5 mg BID  #Hx of LLE and BL PE (2017)-held PTA apixaban for chest tube placement  #Macrocytic Anemia  Hypersegmented neutrophils present on 10/28 peripheral smear          Diet: Regular Diet Adult    DVT Prophylaxis: DOAC  De La Cruz Catheter: Not present  Lines: None     Cardiac Monitoring: None  Code Status: Full Code      Clinically Significant Risk Factors Present on Admission               # Hypoalbuminemia: Lowest albumin = 2.6 g/dL at 10/29/2024  5:19 AM, will monitor as appropriate  # Drug Induced Coagulation Defect: home medication list includes an anticoagulant medication       # Anemia: based on hgb <11                  Disposition Plan     Medically Ready for Discharge: Anticipated in 2-4 Days             Phil Johnson MD  Hospitalist Service, GOLD TEAM 21  [facility]  Securely message with Data Symmetry (more info)  Text page via [employer] Paging/Directory   See signed in provider for up to date coverage information  ______________________________________________________________________    Interval History   Breathing comfortably, no distress. Worried about possible diagnosis/prognosis.  Denies fevers, coughing up sputum occasionally.    Physical Exam   Vital Signs: Temp: 98.7  F (37.1  C) Temp src: Oral BP: 131/72 Pulse: 93   Resp: 18 SpO2: 100 % O2 Device: None (Room air)    Weight: 143 lbs 9.6 oz  General: alert, interactive, no distress  Cards: No murmurs, rubs, gallops. Regular rate and rhythm   Pulm: No crackles, wheezes, rales. Coarse on RLL  Abd: Soft, nontender, nondistended, nontympanitic.   Extremeties: Radial, pedal, tibial pulses present.   Skin: no  rashes, lesions   Neuro: No focal deficits noted     Medical Decision Making       MANAGEMENT DISCUSSED with the following over the past 24 hours: IR and Pulm       Data     I have personally reviewed the following data over the past 24 hrs:    30.9 (H)  \   10.0 (L)   / 409     135 105 22.8 /  98   3.9 22 1.41 (H) \     ALT: 15 AST: 11 AP: 168 (H) TBILI: 0.5   ALB: 2.6 (L) TOT PROTEIN: 7.2 LIPASE: N/A     INR:  1.47 (H) PTT:  48 (H)   D-dimer:  N/A Fibrinogen:  N/A     Ferritin:  N/A % Retic:  N/A LDH:  143       Imaging results reviewed over the past 24 hrs:   No results found for this or any previous visit (from the past 24 hours).

## 2024-10-29 NOTE — CONSULTS
Brief IP note:     Please arrange for chest tube insertion by IR and send for fluid studies. Will hold on doing procedures for now until the fluid cytology result is out. Please reach out in the meantime for further questions.

## 2024-10-29 NOTE — PLAN OF CARE
"Goal Outcome Evaluation:      Plan of Care Reviewed With: patient    Overall Patient Progress: no change     ADMISSION    D: Patient admitted/transferred from ED via stretcher for effusion in right lung.     I: Upon arrival to the unit patient was oriented to room, unit, and call light. Patient s height, weight, and vital signs were obtained. Allergies reviewed. Provider notified of patient s arrival on the unit. Adult AVS completed. Head to toe assessment completed. Education assessment completed. Care plan initiated.    A: Vital signs stable upon admission. Patient rates pain at 0/10. PRN Tylenol administered for temp 99.0.  Two RN skin check completed with COURTNEY Kaye. Significant Skin Findings include no open areas, x2 intact nodules?mass? on spine. Pt states they have been there for many years and do not cause discomfort.     P: Continue to monitor patient s condition and intervene as needed. Continue with plan of care. Notify provider with any concerns or changes in patient status.       Pt is aware he is to be NPO at midnight for thoracentesis in the AM. CHG wipes bath completed x1.          VS: /71 (BP Location: Right arm)   Pulse 91   Temp 98.6  F (37  C) (Oral)   Resp 18   Ht 1.778 m (5' 10\")   Wt 65.1 kg (143 lb 9.6 oz)   SpO2 100%   BMI 20.60 kg/m         O2: Room air. Denies SOB. Respirations even and non labored.    Output: Continent. Voids spontaneously in bathroom.    Last BM: 10/28   Activity: Independent    Skin: No open areas. Skin clean and intact. X2 nodules or mass in mid spine. Denies pain or discomfort.    Pain: Denies pain    Neuro: Alert and oriented. Pleasant and cooperative.    Dressing: R PIV.    Diet: NPO at midnight    LDA: R PIV.    Equipment: IV pole. Cell phone. Clothing. Glasses. Home medications labeled ans sent down to pharmacy.    Plan: Possible thoracentesis tomorrow. NPO at midnight.    Additional Info: Pt had x1 CHG wipe bath completed.             "

## 2024-10-29 NOTE — PLAN OF CARE
"Goal Outcome Evaluation:      Plan of Care Reviewed With: patient    Overall Patient Progress: no changeOverall Patient Progress: no change     Discharge plan: Home when medically stable.      VS: /79 (BP Location: Right arm)   Pulse 79   Temp 98.8  F (37.1  C) (Oral)   Resp 18   Ht 1.778 m (5' 10\")   Wt 65.1 kg (143 lb 9.6 oz)   SpO2 100%   BMI 20.60 kg/m       O2: Sating >95% on RA, denies SOB/Chest pain. Denies N/V. Afebrile this shift. LS clear.    Output: Voids spontaneously in bathroom   Last BM: bowel sounds normoactive x4   Activity: Up independently in room   Up for meals? Yes   Skin: All visible skin intact   Pain: Denies pain   CMS: AO x4, Denies N/T   Dressing: None   Diet: Regular diet/thin liquids   LDA: Right PIV/SL   Equipment:  IV pole, personal belongings   Plan: Call light within reach, bed in a low position. Able to make needs known. Continue to monitor with POC.   Additional Info:  Will need sputum collected again      Needs to be NPO for thoracentesis /chest tube placement in the morning     "

## 2024-10-29 NOTE — PHARMACY-VANCOMYCIN DOSING SERVICE
Empirically decreased vancomycin dose due to increase in creatinine today. Unsure if creatinine increase is due to LILLIAM vs CKD creatinine variation (unable to find documentation of his baseline creatinine range), but will adjust the vanco dosing to be safe.     Old regimen 1500mg IV Q24H:  Loading dose: N/A  Regimen: 1500 mg IV every 24 hours.  Exposure target: AUC24 (range)400-600 mg/L.hr   AUC24,ss: 734 mg/L.hr  Probability of AUC24 > 400: 97 %  Ctrough,ss: 22.8 mg/L  Probability of Ctrough,ss > 20: 62 %  Probability of nephrotoxicity (Lodise LUIS 2009): 23 %    New regimen of 1000mg IV Q24H:  Loading dose: N/A  Regimen: 1000 mg IV every 24 hours.  Start time: 18:00 on 10/29/2024  Exposure target: AUC24 (range)400-600 mg/L.hr   AUC24,ss: 504 mg/L.hr  Probability of AUC24 > 400: 75 %  Ctrough,ss: 15.7 mg/L  Probability of Ctrough,ss > 20: 28 %  Probability of nephrotoxicity (Lodise LUIS 2009): 11 %    Mel Denson, PharmD, BCPS

## 2024-10-30 ENCOUNTER — APPOINTMENT (OUTPATIENT)
Dept: INTERVENTIONAL RADIOLOGY/VASCULAR | Facility: CLINIC | Age: 74
DRG: 177 | End: 2024-10-30
Attending: PHYSICIAN ASSISTANT
Payer: COMMERCIAL

## 2024-10-30 LAB
ALBUMIN SERPL BCG-MCNC: 2.6 G/DL (ref 3.5–5.2)
ANION GAP SERPL CALCULATED.3IONS-SCNC: 10 MMOL/L (ref 7–15)
APPEARANCE FLD: ABNORMAL
BUN SERPL-MCNC: 19.4 MG/DL (ref 8–23)
CALCIUM SERPL-MCNC: 8.4 MG/DL (ref 8.8–10.4)
CELL COUNT BODY FLUID SOURCE: ABNORMAL
CHLORIDE SERPL-SCNC: 106 MMOL/L (ref 98–107)
COLOR FLD: YELLOW
CREAT SERPL-MCNC: 1.25 MG/DL (ref 0.67–1.17)
EGFRCR SERPLBLD CKD-EPI 2021: 60 ML/MIN/1.73M2
ERYTHROCYTE [DISTWIDTH] IN BLOOD BY AUTOMATED COUNT: 13.2 % (ref 10–15)
GLUCOSE BODY FLUID SOURCE: NORMAL
GLUCOSE FLD-MCNC: <2 MG/DL
GLUCOSE SERPL-MCNC: 94 MG/DL (ref 70–99)
HCO3 SERPL-SCNC: 21 MMOL/L (ref 22–29)
HCT VFR BLD AUTO: 32.6 % (ref 40–53)
HGB BLD-MCNC: 10.6 G/DL (ref 13.3–17.7)
LD BODY BODY FLUID SOURCE: NORMAL
LDH FLD L TO P-CCNC: >2500 U/L
LYMPHOCYTES NFR FLD MANUAL: 0 %
MCH RBC QN AUTO: 39.1 PG (ref 26.5–33)
MCHC RBC AUTO-ENTMCNC: 32.5 G/DL (ref 31.5–36.5)
MCV RBC AUTO: 120 FL (ref 78–100)
MONOS+MACROS NFR FLD MANUAL: 0 %
NEUTS BAND NFR FLD MANUAL: 100 %
PHOSPHATE SERPL-MCNC: 3.1 MG/DL (ref 2.5–4.5)
PLATELET # BLD AUTO: 425 10E3/UL (ref 150–450)
POTASSIUM SERPL-SCNC: 4.2 MMOL/L (ref 3.4–5.3)
PROT FLD-MCNC: 5.6 G/DL
PROTEIN BODY FLUID SOURCE: NORMAL
RBC # BLD AUTO: 2.71 10E6/UL (ref 4.4–5.9)
SODIUM SERPL-SCNC: 137 MMOL/L (ref 135–145)
WBC # BLD AUTO: 32.4 10E3/UL (ref 4–11)
WBC # FLD AUTO: ABNORMAL /UL

## 2024-10-30 PROCEDURE — 88112 CYTOPATH CELL ENHANCE TECH: CPT | Mod: TC

## 2024-10-30 PROCEDURE — 272N000504 HC NEEDLE CR4

## 2024-10-30 PROCEDURE — C1729 CATH, DRAINAGE: HCPCS

## 2024-10-30 PROCEDURE — 87206 SMEAR FLUORESCENT/ACID STAI: CPT

## 2024-10-30 PROCEDURE — 83615 LACTATE (LD) (LDH) ENZYME: CPT

## 2024-10-30 PROCEDURE — 250N000009 HC RX 250: Performed by: PHYSICIAN ASSISTANT

## 2024-10-30 PROCEDURE — 0W9930Z DRAINAGE OF RIGHT PLEURAL CAVITY WITH DRAINAGE DEVICE, PERCUTANEOUS APPROACH: ICD-10-PCS | Performed by: PHYSICIAN ASSISTANT

## 2024-10-30 PROCEDURE — 89051 BODY FLUID CELL COUNT: CPT

## 2024-10-30 PROCEDURE — C1769 GUIDE WIRE: HCPCS

## 2024-10-30 PROCEDURE — 32557 INSERT CATH PLEURA W/ IMAGE: CPT | Mod: RT | Performed by: PHYSICIAN ASSISTANT

## 2024-10-30 PROCEDURE — 99232 SBSQ HOSP IP/OBS MODERATE 35: CPT | Performed by: STUDENT IN AN ORGANIZED HEALTH CARE EDUCATION/TRAINING PROGRAM

## 2024-10-30 PROCEDURE — 32557 INSERT CATH PLEURA W/ IMAGE: CPT

## 2024-10-30 PROCEDURE — 120N000002 HC R&B MED SURG/OB UMMC

## 2024-10-30 PROCEDURE — 87801 DETECT AGNT MULT DNA AMPLI: CPT | Performed by: STUDENT IN AN ORGANIZED HEALTH CARE EDUCATION/TRAINING PROGRAM

## 2024-10-30 PROCEDURE — 88305 TISSUE EXAM BY PATHOLOGIST: CPT | Mod: 26 | Performed by: PATHOLOGY

## 2024-10-30 PROCEDURE — 85018 HEMOGLOBIN: CPT | Performed by: STUDENT IN AN ORGANIZED HEALTH CARE EDUCATION/TRAINING PROGRAM

## 2024-10-30 PROCEDURE — 250N000011 HC RX IP 250 OP 636: Performed by: STUDENT IN AN ORGANIZED HEALTH CARE EDUCATION/TRAINING PROGRAM

## 2024-10-30 PROCEDURE — 87205 SMEAR GRAM STAIN: CPT

## 2024-10-30 PROCEDURE — 250N000013 HC RX MED GY IP 250 OP 250 PS 637

## 2024-10-30 PROCEDURE — 36415 COLL VENOUS BLD VENIPUNCTURE: CPT | Performed by: STUDENT IN AN ORGANIZED HEALTH CARE EDUCATION/TRAINING PROGRAM

## 2024-10-30 PROCEDURE — 250N000013 HC RX MED GY IP 250 OP 250 PS 637: Performed by: STUDENT IN AN ORGANIZED HEALTH CARE EDUCATION/TRAINING PROGRAM

## 2024-10-30 PROCEDURE — 82945 GLUCOSE OTHER FLUID: CPT

## 2024-10-30 PROCEDURE — 82947 ASSAY GLUCOSE BLOOD QUANT: CPT | Performed by: STUDENT IN AN ORGANIZED HEALTH CARE EDUCATION/TRAINING PROGRAM

## 2024-10-30 PROCEDURE — 88112 CYTOPATH CELL ENHANCE TECH: CPT | Mod: 26 | Performed by: PATHOLOGY

## 2024-10-30 PROCEDURE — 272N000192 HC ACCESSORY CR2

## 2024-10-30 PROCEDURE — 84157 ASSAY OF PROTEIN OTHER: CPT

## 2024-10-30 RX ORDER — LIDOCAINE HYDROCHLORIDE 10 MG/ML
.5-1 INJECTION, SOLUTION EPIDURAL; INFILTRATION; INTRACAUDAL; PERINEURAL ONCE
Status: COMPLETED | OUTPATIENT
Start: 2024-10-30 | End: 2024-10-30

## 2024-10-30 RX ADMIN — PIPERACILLIN AND TAZOBACTAM 3.38 G: 3; .375 INJECTION, POWDER, LYOPHILIZED, FOR SOLUTION INTRAVENOUS at 10:50

## 2024-10-30 RX ADMIN — PIPERACILLIN AND TAZOBACTAM 3.38 G: 3; .375 INJECTION, POWDER, LYOPHILIZED, FOR SOLUTION INTRAVENOUS at 04:25

## 2024-10-30 RX ADMIN — PIPERACILLIN AND TAZOBACTAM 3.38 G: 3; .375 INJECTION, POWDER, LYOPHILIZED, FOR SOLUTION INTRAVENOUS at 22:32

## 2024-10-30 RX ADMIN — APIXABAN 2.5 MG: 2.5 TABLET, FILM COATED ORAL at 20:02

## 2024-10-30 RX ADMIN — PIPERACILLIN AND TAZOBACTAM 3.38 G: 3; .375 INJECTION, POWDER, LYOPHILIZED, FOR SOLUTION INTRAVENOUS at 17:02

## 2024-10-30 RX ADMIN — APIXABAN 2.5 MG: 2.5 TABLET, FILM COATED ORAL at 10:50

## 2024-10-30 RX ADMIN — HYDROXYUREA 500 MG: 500 CAPSULE ORAL at 10:50

## 2024-10-30 RX ADMIN — HYDROXYUREA 500 MG: 500 CAPSULE ORAL at 20:02

## 2024-10-30 RX ADMIN — LIDOCAINE HYDROCHLORIDE 2 ML: 10 INJECTION, SOLUTION EPIDURAL; INFILTRATION; INTRACAUDAL; PERINEURAL at 10:07

## 2024-10-30 NOTE — PROGRESS NOTES
"Shift 0938-6683    VS: /68 (BP Location: Left arm)   Pulse 86   Temp 98.3  F (36.8  C) (Oral)   Resp 18   Ht 1.778 m (5' 10\")   Wt 65.1 kg (143 lb 9.6 oz)   SpO2 98%   BMI 20.60 kg/m       O2: On RA, denies chest pain or SOB    Output: Continent- voids spontaneously in bathroom    Last BM: Last BM 10/29, loose stool per pt    Activity: Independent  in room    Skin: Visible skin intact   Pain: Denies    CMS: A&OX4, denies N/V   Dressing: None    Diet: NPO midnight for chest tube placement in AM   LDA: RPIV leaking at site during ATB infusing. Stopped IV.  removed it. Notified flyer.   Flyer placed a L PIV.    Equipment: IV pole and personal belonging    Plan: Plan for chest tube placement @7378-2747   Additional Info: Sputum collection needed ( Respiratory Aerobic bacterial culture with gram stain )     CHG wipes done during shift, preop  prep checklist completed         "

## 2024-10-30 NOTE — PLAN OF CARE
"Goal Outcome Evaluation:            Incomplete         Shift 0394-0852     VS: /68 (BP Location: Left arm)   Pulse 86   Temp 98.3  F (36.8  C) (Oral)   Resp 18   Ht 1.778 m (5' 10\")   Wt 65.1 kg (143 lb 9.6 oz)   SpO2 98%   BMI 20.60 kg/m        O2: On RA, denies chest pain or SOB    Output: Continent- voids spontaneously in bathroom    Last BM: Last BM 10/29, loose stool per pt    Activity: Independent  in room    Skin: Visible skin intact   Pain: Denies    CMS: A&OX4, denies N/V   Dressing: None    Diet: NPO midnight for chest tube placement in AM   LDA: RPIV leaking at site during ATB infusing. Stopped IV.  removed it. Notified flyer.   Flyer placed a L PIV.    Equipment: IV pole and personal belonging    Plan: Plan for chest tube placement @5366-0865   Additional Info: Sputum collection needed ( Respiratory Aerobic bacterial culture with gram stain )                                      Problem: Adult Inpatient Plan of Care  Goal: Plan of Care Review  Description: The Plan of Care Review/Shift note should be completed every shift.  The Outcome Evaluation is a brief statement about your assessment that the patient is improving, declining, or no change.  This information will be displayed automatically on your shift  note.  Outcome: Progressing  Goal: Patient-Specific Goal (Individualized)  Description: You can add care plan individualizations to a care plan. Examples of Individualization might be:  \"Parent requests to be called daily at 9am for status\", \"I have a hard time hearing out of my right ear\", or \"Do not touch me to wake me up as it startles  me\".  Outcome: Progressing  Goal: Absence of Hospital-Acquired Illness or Injury  Outcome: Progressing  Intervention: Prevent Infection  Recent Flowsheet Documentation  Taken 10/30/2024 0108 by Kedar Valdez RN  Infection Prevention: hand hygiene promoted  Goal: Optimal Comfort and Wellbeing  Outcome: Progressing  Goal: Readiness for Transition of " Care  Outcome: Progressing       Problem: Skin Injury Risk Increased  Goal: Skin Health and Integrity  Outcome: Progressing  Intervention: Plan: Nurse Driven Intervention: Moisture Management  Recent Flowsheet Documentation  Taken 10/29/2024 1952 by Kedar Valdez, RN  Moisture Interventions: Encourage regular toileting

## 2024-10-30 NOTE — PLAN OF CARE
"Goal Outcome Evaluation:  /74 (BP Location: Right arm)   Pulse 84   Temp 99.1  F (37.3  C) (Oral)   Resp 16   Ht 1.778 m (5' 10\")   Wt 65.1 kg (143 lb 9.6 oz)   SpO2 95%   BMI 20.60 kg/m    Alert and oriented x4, able to make needs known. Denies having chest pain/ SOB, cough or fevers/chills. VSS, on R/A. Radha any new concerns post procedure, Independent to the bathroom , continent of both.  R. Chest tube on suction -20 cm H2O per order and working well.  Dressing CDI, Drainage- see Flowsheet. Safety measures in place, call light within reach, plan of care continues.     chest tube in place   No cough or fevers/chills. Radha concerns, agreeable to plan.     Problem: Adult Inpatient Plan of Care  Goal: Plan of Care Review  Description: The Plan of Care Review/Shift note should be completed every shift.  The Outcome Evaluation is a brief statement about your assessment that the patient is improving, declining, or no change.  This information will be displayed automatically on your shift  note.  Outcome: Progressing  Flowsheets (Taken 10/30/2024 1324)  Plan of Care Reviewed With: patient  Overall Patient Progress: no change  Goal: Patient-Specific Goal (Individualized)  Description: You can add care plan individualizations to a care plan. Examples of Individualization might be:  \"Parent requests to be called daily at 9am for status\", \"I have a hard time hearing out of my right ear\", or \"Do not touch me to wake me up as it startles  me\".  Outcome: Progressing  Goal: Absence of Hospital-Acquired Illness or Injury  Outcome: Progressing  Intervention: Identify and Manage Fall Risk  Recent Flowsheet Documentation  Taken 10/30/2024 1233 by Shea Sloan RN  Safety Promotion/Fall Prevention:   clutter free environment maintained   lighting adjusted   nonskid shoes/slippers when out of bed   room near nurse's station  Intervention: Prevent Skin Injury  Recent Flowsheet Documentation  Taken 10/30/2024 " 1233 by Shea Sloan RN  Body Position: position changed independently  Skin Protection: adhesive use limited  Taken 10/30/2024 1037 by Shea Sloan RN  Body Position: position changed independently  Intervention: Prevent Infection  Recent Flowsheet Documentation  Taken 10/30/2024 1233 by Shea Sloan RN  Infection Prevention:   hand hygiene promoted   personal protective equipment utilized   rest/sleep promoted  Goal: Optimal Comfort and Wellbeing  Outcome: Progressing  Goal: Readiness for Transition of Care  Outcome: Progressing     Problem: Skin Injury Risk Increased  Goal: Skin Health and Integrity  Outcome: Progressing  Intervention: Plan: Nurse Driven Intervention: Moisture Management  Recent Flowsheet Documentation  Taken 10/30/2024 1233 by Shea Sloan RN  Moisture Interventions: Encourage regular toileting  Bathing/Skin Care:   wipes, CHG   bath, chlorhexidine wipes  Intervention: Optimize Skin Protection  Recent Flowsheet Documentation  Taken 10/30/2024 1233 by Shea Sloan RN  Skin Protection: adhesive use limited  Activity Management: activity adjusted per tolerance  Head of Bed (HOB) Positioning: HOB at 30-45 degrees  Taken 10/30/2024 1037 by Shea Sloan RN  Activity Management: activity adjusted per tolerance         Plan of Care Reviewed With: patient    Overall Patient Progress: no changeOverall Patient Progress: no change

## 2024-10-30 NOTE — PROCEDURES
[facility]    Procedure: IR Procedure Note    Date/Time: 10/30/2024 10:11 AM    Performed by: Marcus Iniguez PA-C  Authorized by: Marcus Iniguez PA-C  IR Fellow Physician:  Other(s) attending procedure: Assist: SHARONA Gomez    Pre Procedure Diagnosis: Complex right pleural effusion  Post Procedure Diagnosis: Same    UNIVERSAL PROTOCOL   Site Marked: NA  Prior Images Obtained and Reviewed:  Yes  Required items: Required blood products, implants, devices and special equipment available    Patient identity confirmed:  Hospital-assigned identification number (IR nurse)  NA - No sedation, light sedation, or local anesthesia  Confirmation Checklist:  Procedure was appropriate and matched the consent or emergent situation  Time out: Immediately prior to the procedure a time out was called    Universal Protocol: the Joint Commission Universal Protocol was followed    Preparation: Patient was prepped and draped in usual sterile fashion    ESBL (mL):  0.5     ANESTHESIA    Anesthesia:  Local infiltration  Local Anesthetic:  Lidocaine 1% without epinephrine  Anesthetic Total (mL):  4      SEDATION    Patient Sedated: No    Findings: U/S guided placement of right-sided 14 Fr., non locking pigtail chest tube. Return of opaque light green fluid. 90 ml aspirated and sent for labs as ordered.    Specimens: fluid and/or tissue for laboratory analysis    Procedural Complications: None    Condition: Stable    Plan: Follow up per primary team.      PROCEDURE    Length of time physician/provider present for 1:1 monitoring during sedation:  0 min

## 2024-10-30 NOTE — IR NOTE
Patient Name: Jose Luis Parker  Medical Record Number: [id number]  Today's Date: 10/30/2024    Procedure: Chest tube placement  Proceduralist: Anirudh Iniguez PA-C    Procedure Start: 0949   Procedure end: 1008  Sedation medications administered: n/a     Report given to: Shone, RN    Other Notes: Pt arrived to IR room 2 from [place] [address]. Consent reviewed. Pt denies any questions or concerns regarding procedure. Pt positioned upright and monitored per protocol. Pt tolerated procedure without any noted complications. 90 ml of fluid collected and sent to lab.  Pt transferred back to [place] [address].

## 2024-10-31 ENCOUNTER — APPOINTMENT (OUTPATIENT)
Dept: GENERAL RADIOLOGY | Facility: CLINIC | Age: 74
DRG: 177 | End: 2024-10-31
Attending: STUDENT IN AN ORGANIZED HEALTH CARE EDUCATION/TRAINING PROGRAM
Payer: COMMERCIAL

## 2024-10-31 LAB
ALBUMIN SERPL BCG-MCNC: 2.7 G/DL (ref 3.5–5.2)
ANION GAP SERPL CALCULATED.3IONS-SCNC: 9 MMOL/L (ref 7–15)
BUN SERPL-MCNC: 19.3 MG/DL (ref 8–23)
CALCIUM SERPL-MCNC: 8.4 MG/DL (ref 8.8–10.4)
CHLORIDE SERPL-SCNC: 106 MMOL/L (ref 98–107)
CREAT SERPL-MCNC: 1.3 MG/DL (ref 0.67–1.17)
EGFRCR SERPLBLD CKD-EPI 2021: 57 ML/MIN/1.73M2
ERYTHROCYTE [DISTWIDTH] IN BLOOD BY AUTOMATED COUNT: 13.5 % (ref 10–15)
GLUCOSE SERPL-MCNC: 89 MG/DL (ref 70–99)
HCO3 SERPL-SCNC: 22 MMOL/L (ref 22–29)
HCT VFR BLD AUTO: 33.5 % (ref 40–53)
HGB BLD-MCNC: 11 G/DL (ref 13.3–17.7)
MCH RBC QN AUTO: 39.7 PG (ref 26.5–33)
MCHC RBC AUTO-ENTMCNC: 32.8 G/DL (ref 31.5–36.5)
MCV RBC AUTO: 121 FL (ref 78–100)
PATH REPORT.COMMENTS IMP SPEC: NORMAL
PATH REPORT.COMMENTS IMP SPEC: NORMAL
PATH REPORT.FINAL DX SPEC: NORMAL
PATH REPORT.GROSS SPEC: NORMAL
PATH REPORT.RELEVANT HX SPEC: NORMAL
PHOSPHATE SERPL-MCNC: 3.4 MG/DL (ref 2.5–4.5)
PLATELET # BLD AUTO: 414 10E3/UL (ref 150–450)
POTASSIUM SERPL-SCNC: 4.4 MMOL/L (ref 3.4–5.3)
RBC # BLD AUTO: 2.77 10E6/UL (ref 4.4–5.9)
SODIUM SERPL-SCNC: 137 MMOL/L (ref 135–145)
WBC # BLD AUTO: 31.1 10E3/UL (ref 4–11)

## 2024-10-31 PROCEDURE — 120N000002 HC R&B MED SURG/OB UMMC

## 2024-10-31 PROCEDURE — 99232 SBSQ HOSP IP/OBS MODERATE 35: CPT | Performed by: STUDENT IN AN ORGANIZED HEALTH CARE EDUCATION/TRAINING PROGRAM

## 2024-10-31 PROCEDURE — 250N000013 HC RX MED GY IP 250 OP 250 PS 637: Performed by: STUDENT IN AN ORGANIZED HEALTH CARE EDUCATION/TRAINING PROGRAM

## 2024-10-31 PROCEDURE — 36415 COLL VENOUS BLD VENIPUNCTURE: CPT | Performed by: STUDENT IN AN ORGANIZED HEALTH CARE EDUCATION/TRAINING PROGRAM

## 2024-10-31 PROCEDURE — 250N000013 HC RX MED GY IP 250 OP 250 PS 637

## 2024-10-31 PROCEDURE — 250N000011 HC RX IP 250 OP 636: Performed by: STUDENT IN AN ORGANIZED HEALTH CARE EDUCATION/TRAINING PROGRAM

## 2024-10-31 PROCEDURE — 80069 RENAL FUNCTION PANEL: CPT | Performed by: STUDENT IN AN ORGANIZED HEALTH CARE EDUCATION/TRAINING PROGRAM

## 2024-10-31 PROCEDURE — 71045 X-RAY EXAM CHEST 1 VIEW: CPT | Mod: 26 | Performed by: RADIOLOGY

## 2024-10-31 PROCEDURE — 71045 X-RAY EXAM CHEST 1 VIEW: CPT

## 2024-10-31 PROCEDURE — 85014 HEMATOCRIT: CPT | Performed by: STUDENT IN AN ORGANIZED HEALTH CARE EDUCATION/TRAINING PROGRAM

## 2024-10-31 RX ADMIN — APIXABAN 2.5 MG: 2.5 TABLET, FILM COATED ORAL at 19:45

## 2024-10-31 RX ADMIN — PIPERACILLIN AND TAZOBACTAM 3.38 G: 3; .375 INJECTION, POWDER, LYOPHILIZED, FOR SOLUTION INTRAVENOUS at 21:43

## 2024-10-31 RX ADMIN — HYDROXYUREA 500 MG: 500 CAPSULE ORAL at 08:54

## 2024-10-31 RX ADMIN — PIPERACILLIN AND TAZOBACTAM 3.38 G: 3; .375 INJECTION, POWDER, LYOPHILIZED, FOR SOLUTION INTRAVENOUS at 04:28

## 2024-10-31 RX ADMIN — PIPERACILLIN AND TAZOBACTAM 3.38 G: 3; .375 INJECTION, POWDER, LYOPHILIZED, FOR SOLUTION INTRAVENOUS at 16:03

## 2024-10-31 RX ADMIN — PIPERACILLIN AND TAZOBACTAM 3.38 G: 3; .375 INJECTION, POWDER, LYOPHILIZED, FOR SOLUTION INTRAVENOUS at 10:09

## 2024-10-31 RX ADMIN — APIXABAN 2.5 MG: 2.5 TABLET, FILM COATED ORAL at 08:53

## 2024-10-31 RX ADMIN — HYDROXYUREA 500 MG: 500 CAPSULE ORAL at 19:45

## 2024-10-31 NOTE — PROGRESS NOTES
End of shift Summary: See flowsheet for VS and detail assessments.     Changes this Shift:      Pulmonary: Pt denies SOB & chest pain. Pt is on RA.     Diet: Regular diet, medication whole with thin liquids.    Output: Pt is voiding adequately up to bathroom.     Activity: Pt is independent in room.     Skin: Chest tube sight.     Pain: Pt denies pain.     Neuro/CMS: Pt is alert and oriented x 4. Denies numbness and tingling.     Dressings/Drains: Chest tube, CDI.     IV: L PIV.    RN managed: No RN managed electrolytes    Additional info: No significant changes on shift.     Plan:  Plan of care ongoing.

## 2024-10-31 NOTE — PROGRESS NOTES
[facility]    Medicine Progress Note - Hospitalist Service, GOLD TEAM 21    Date of Admission:  10/28/2024    Assessment & Plan   Jose Luis Parker is a 74 year old male with a  history of chronic myeloproliferative disorder, LLE DVT and bilateral PE (2017) who presents to  ED for evaluation of a cough. Two weeks ago the patient began experiencing fever and a cough that persisted.  Patient returned to urgent care and was prescribed a 10-day course of Augmentin, 10 days ago.  With past several days the patient states his fever has improved and his cough has lessened.  Patient returned to urgent care yesterday where chest x-ray showed persistent indeterminant opacity at the medial right lung base.  Patient again returned to his PCP today for CT chest which was concerning for large subpulmonic, complex effusion in the right upper lung base;      Changes today:  - s/p IR chest tube placement 10/30/24.    - discussed with IP and pulmonary - plan to repeat CT chest tomorrow AM and may consider discontinuation at that time.  Keep chest tube connected to wall suction until that time.  - IP to consider timing of nodule biopsy     Acute problems  #Right lower lobe pneumonia  #Multilobed loculated right side pleural effusion  #Concern for primary lung malignancy  Patient had 2 weeks of fever and green mucoid cough recently treated with 10 days of Augmentin. He followed up in urgent care 10/27, where a chest xray showed persistent opacities in the right lung base. He was instructed to follow up the next day (10/28) for a chest CT for further evaluation, which showed multiple pathologic findings. There was a tubular opacity in the right upper lobe suspicious for a primary lung malignancy. There is also a complex, bilobed effusion in the right lung base, with inflammator stranding and trace pericardial effusion. There is also a trace loculated effusion posterolaterally in the  right middle and lower lobes.   -IR, pulm consult, appreciate recs  -Continue zosyn for broad spectrum coverage.   -Follow up blood and sputum cultures  - follow up pleural fluid studies     Chronic Problems  #Hx of myeloproliferative disorder-continue PTA hydroxyurea 500mg BID and apixiban 2.5 mg BID  #Hx of LLE and BL PE (2017)-held PTA apixaban for chest tube placement  #Macrocytic Anemia  Hypersegmented neutrophils present on 10/28 peripheral smear          Diet: Regular Diet Adult    DVT Prophylaxis: DOAC  De La Cruz Catheter: Not present  Lines: None     Cardiac Monitoring: None  Code Status: Full Code      Clinically Significant Risk Factors               # Hypoalbuminemia: Lowest albumin = 2.6 g/dL at 10/30/2024 11:15 AM, will monitor as appropriate                          Disposition Plan     Medically Ready for Discharge: Anticipated Tomorrow             Phil Johnson MD  Hospitalist Service, GOLD TEAM 21  [facility]  Securely message with [employer] (more info)  Text page via [employer] Paging/Directory   See signed in provider for up to date coverage information  ______________________________________________________________________    Interval History   Doing well, breathing comfortably. Denies concerns.  No fevers or chills. Understanding of plan.    Physical Exam   Vital Signs: Temp: 98.4  F (36.9  C) Temp src: Oral BP: (!) 141/80 Pulse: 79   Resp: 18 SpO2: 98 % O2 Device: None (Room air)    Weight: 143 lbs 9.6 oz    General: alert, no distress  Cards: No murmurs, rubs, gallops. Regular rate and rhythm   Pulm: No crackles, wheezes, rales. Coarse in RLL  Abd: Soft, nontender, nondistended, nontympanitic.    Skin: no rashes, lesions   Neuro: No focal deficits noted      Medical Decision Making       MANAGEMENT DISCUSSED with the following over the past 24 hours: IP and Pulm       Data     I have personally reviewed the following data over the past 24  hrs:    31.1 (H)  \   11.0 (L)   / 414     137 106 19.3 /  89   4.4 22 1.30 (H) \     ALT: N/A AST: N/A AP: N/A TBILI: N/A   ALB: 2.7 (L) TOT PROTEIN: N/A LIPASE: N/A       Imaging results reviewed over the past 24 hrs:   Recent Results (from the past 24 hours)   XR Chest Port 1 View    Narrative    Exam: XR CHEST PORT 1 VIEW, 10/31/2024 10:56 AM    Comparison: Correlation made with CT chest, 10/28/2024; chest  radiograph, 10/27/2024    History: eval effusion/empyema s/p chest tube    Findings:  Frontal upright radiograph of the chest was obtained. Slightly  decreased volume of right side pleural effusion compared with  radiograph dated 10/27/2024 status post right basilar pigtail catheter  placement. Left costophrenic angle is clear. No pneumothorax. Midline  trachea. Stable cardiac silhouette. Right greater than left patchy  interstitial opacities, similar to prior. Known nodular opacity in the  right upper lobe is better seen on comparison CT. No definite airspace  consolidation. No pneumothorax.      Impression    Impression:   1. Decreased volume of right basilar loculated pleural effusion post  pigtail catheter placement compared with exam dated 10/27/2024. No  appreciable pneumothorax.  2. Continued right greater than left interstitial and patchy airspace  opacities, favored to represent atelectatic change versus very mild  pulmonary edema versus infection.    I have personally reviewed the examination and initial interpretation  and I agree with the findings.    DEEPA ROBBINS,          SYSTEM ID:  [id number]

## 2024-10-31 NOTE — PLAN OF CARE
"Goal Outcome Evaluation:  /79 (BP Location: Right arm)   Pulse 82   Temp 97.5  F (36.4  C) (Oral)   Resp 18   Ht 1.778 m (5' 10\")   Wt 65.1 kg (143 lb 9.6 oz)   SpO2 98%   BMI 20.60 kg/m     Problem: Adult Inpatient Plan of Care  Goal: Plan of Care Review  Description: The Plan of Care Review/Shift note should be completed every shift.  The Outcome Evaluation is a brief statement about your assessment that the patient is improving, declining, or no change.  This information will be displayed automatically on your shift  note.  Outcome: Progressing  Flowsheets (Taken 10/31/2024 1737)  Outcome Evaluation: No acute changes during shift, alert and oriented x4, able to make needs known. Continues to be independent in room, Continent of both, family at bed side.  Plan of Care Reviewed With:   patient   spouse  Overall Patient Progress: improving  Goal: Patient-Specific Goal (Individualized)  Description: You can add care plan individualizations to a care plan. Examples of Individualization might be:  \"Parent requests to be called daily at 9am for status\", \"I have a hard time hearing out of my right ear\", or \"Do not touch me to wake me up as it startles  me\".  Outcome: Progressing  Goal: Absence of Hospital-Acquired Illness or Injury  Outcome: Progressing  Intervention: Identify and Manage Fall Risk  Recent Flowsheet Documentation  Taken 10/31/2024 0900 by Shea Sloan RN  Safety Promotion/Fall Prevention:   assistive device/personal items within reach   increased rounding and observation   room near nurse's station   safety round/check completed  Intervention: Prevent Skin Injury  Recent Flowsheet Documentation  Taken 10/31/2024 0900 by Shea Sloan RN  Body Position: position changed independently  Skin Protection: adhesive use limited  Goal: Optimal Comfort and Wellbeing  Outcome: Progressing  Goal: Readiness for Transition of Care  Outcome: Progressing     Problem: Skin Injury Risk " Increased  Goal: Skin Health and Integrity  Outcome: Progressing  Intervention: Plan: Nurse Driven Intervention: Moisture Management  Recent Flowsheet Documentation  Taken 10/31/2024 0900 by Shea Sloan RN  Moisture Interventions: Encourage regular toileting  Intervention: Optimize Skin Protection  Recent Flowsheet Documentation  Taken 10/31/2024 0900 by Shea Sloan RN  Skin Protection: adhesive use limited  Activity Management: activity adjusted per tolerance  Head of Bed (HOB) Positioning: HOB at 20-30 degrees         Plan of Care Reviewed With: patient, spouse    Overall Patient Progress: improvingOverall Patient Progress: improving    Outcome Evaluation: No acute changes during shift, alert and oriented x4, able to make needs known. Continues to be independent in room, Continent of both, family at bed side.   Chest tube connected to continuous wall suction at -20 mmHg per order. Patient comfortable, no new concerns. Output- see flowsheet. Call light within reach, plan of care continues.

## 2024-10-31 NOTE — PROGRESS NOTES
Brief Pulmonary Note     Received a consult for this patient upon recommendation of the interventional pulmonary service for chest tube management. Briefly he had a chest tube placed by IR on 10/30 for R complex pleural effusion with drainage of opaque green fluid which was sent for studies, cultures are pending. He is receiving antibiotics for suspected RLL PNA and empyema. His chest tube has been to water seal since his procedure and has not drained much (90 removed by IR, about 20 additional ml drained per primary). Recommendations for chest tube management as below. We will see this patient in person tomorrow.     - Please put chest tube to suction at -20 mmHg overnight (needs to be connected to wall suction with tubing. Please use continuous rather than intermittent suction).  - If the chest tube has a three way stop cock, please flush the tube qshift with 10 ml of sterile saline for tube patency  - CT chest w/o contrast tomorrow AM to reassess the pleural effusion - will decide on lytics from there   - antibiotics per primary team    Jyothi Bryant MD   Pulmonary/CC Fellow

## 2024-11-01 ENCOUNTER — APPOINTMENT (OUTPATIENT)
Dept: CT IMAGING | Facility: CLINIC | Age: 74
DRG: 177 | End: 2024-11-01
Attending: STUDENT IN AN ORGANIZED HEALTH CARE EDUCATION/TRAINING PROGRAM
Payer: COMMERCIAL

## 2024-11-01 LAB
ACID FAST STAIN (ARUP): NORMAL
ACID FAST STAIN (ARUP): NORMAL
ANION GAP SERPL CALCULATED.3IONS-SCNC: 10 MMOL/L (ref 7–15)
BUN SERPL-MCNC: 18.6 MG/DL (ref 8–23)
CALCIUM SERPL-MCNC: 8.5 MG/DL (ref 8.8–10.4)
CHLORIDE SERPL-SCNC: 105 MMOL/L (ref 98–107)
CREAT SERPL-MCNC: 1.23 MG/DL (ref 0.67–1.17)
EGFRCR SERPLBLD CKD-EPI 2021: 61 ML/MIN/1.73M2
ERYTHROCYTE [DISTWIDTH] IN BLOOD BY AUTOMATED COUNT: 13.5 % (ref 10–15)
GLUCOSE SERPL-MCNC: 92 MG/DL (ref 70–99)
HCO3 SERPL-SCNC: 22 MMOL/L (ref 22–29)
HCT VFR BLD AUTO: 33 % (ref 40–53)
HGB BLD-MCNC: 11 G/DL (ref 13.3–17.7)
MCH RBC QN AUTO: 39.9 PG (ref 26.5–33)
MCHC RBC AUTO-ENTMCNC: 33.3 G/DL (ref 31.5–36.5)
MCV RBC AUTO: 120 FL (ref 78–100)
PLATELET # BLD AUTO: 418 10E3/UL (ref 150–450)
POTASSIUM SERPL-SCNC: 4.2 MMOL/L (ref 3.4–5.3)
RBC # BLD AUTO: 2.76 10E6/UL (ref 4.4–5.9)
SODIUM SERPL-SCNC: 137 MMOL/L (ref 135–145)
WBC # BLD AUTO: 28.9 10E3/UL (ref 4–11)

## 2024-11-01 PROCEDURE — 250N000013 HC RX MED GY IP 250 OP 250 PS 637: Performed by: STUDENT IN AN ORGANIZED HEALTH CARE EDUCATION/TRAINING PROGRAM

## 2024-11-01 PROCEDURE — 85014 HEMATOCRIT: CPT | Performed by: STUDENT IN AN ORGANIZED HEALTH CARE EDUCATION/TRAINING PROGRAM

## 2024-11-01 PROCEDURE — 36415 COLL VENOUS BLD VENIPUNCTURE: CPT | Performed by: STUDENT IN AN ORGANIZED HEALTH CARE EDUCATION/TRAINING PROGRAM

## 2024-11-01 PROCEDURE — 80048 BASIC METABOLIC PNL TOTAL CA: CPT | Performed by: STUDENT IN AN ORGANIZED HEALTH CARE EDUCATION/TRAINING PROGRAM

## 2024-11-01 PROCEDURE — 71250 CT THORAX DX C-: CPT

## 2024-11-01 PROCEDURE — 99223 1ST HOSP IP/OBS HIGH 75: CPT | Mod: GC | Performed by: STUDENT IN AN ORGANIZED HEALTH CARE EDUCATION/TRAINING PROGRAM

## 2024-11-01 PROCEDURE — 99232 SBSQ HOSP IP/OBS MODERATE 35: CPT | Performed by: STUDENT IN AN ORGANIZED HEALTH CARE EDUCATION/TRAINING PROGRAM

## 2024-11-01 PROCEDURE — 120N000002 HC R&B MED SURG/OB UMMC

## 2024-11-01 PROCEDURE — 71250 CT THORAX DX C-: CPT | Mod: 26 | Performed by: RADIOLOGY

## 2024-11-01 PROCEDURE — 250N000013 HC RX MED GY IP 250 OP 250 PS 637

## 2024-11-01 PROCEDURE — 250N000011 HC RX IP 250 OP 636: Performed by: STUDENT IN AN ORGANIZED HEALTH CARE EDUCATION/TRAINING PROGRAM

## 2024-11-01 RX ADMIN — PIPERACILLIN AND TAZOBACTAM 3.38 G: 3; .375 INJECTION, POWDER, LYOPHILIZED, FOR SOLUTION INTRAVENOUS at 04:22

## 2024-11-01 RX ADMIN — PIPERACILLIN AND TAZOBACTAM 3.38 G: 3; .375 INJECTION, POWDER, LYOPHILIZED, FOR SOLUTION INTRAVENOUS at 10:33

## 2024-11-01 RX ADMIN — APIXABAN 2.5 MG: 2.5 TABLET, FILM COATED ORAL at 20:52

## 2024-11-01 RX ADMIN — HYDROXYUREA 500 MG: 500 CAPSULE ORAL at 20:53

## 2024-11-01 RX ADMIN — APIXABAN 2.5 MG: 2.5 TABLET, FILM COATED ORAL at 08:13

## 2024-11-01 RX ADMIN — HYDROXYUREA 500 MG: 500 CAPSULE ORAL at 08:12

## 2024-11-01 RX ADMIN — PIPERACILLIN AND TAZOBACTAM 3.38 G: 3; .375 INJECTION, POWDER, LYOPHILIZED, FOR SOLUTION INTRAVENOUS at 17:17

## 2024-11-01 RX ADMIN — PIPERACILLIN AND TAZOBACTAM 3.38 G: 3; .375 INJECTION, POWDER, LYOPHILIZED, FOR SOLUTION INTRAVENOUS at 22:13

## 2024-11-01 NOTE — PLAN OF CARE
"Goal Outcome Evaluation:      Shift [id number]       VS: /73 (BP Location: Right arm)   Pulse 79   Temp 98.5  F (36.9  C) (Oral)   Resp 19   Ht 1.778 m (5' 10\")   Wt 65.1 kg (143 lb 9.6 oz)   SpO2 96%   BMI 20.60 kg/m       O2: On RA, denies chest pain or SOB     Output: Continent bladder-voids in bathroom    Chest tube in place hooked up to wall suction -20 mmHg - see flowsheet for more info.    Last BM:    Activity: Independent in room    Skin: Chest tube incision    Pain: Denies    CMS: A&OX4    Dressing: To chest tube incision site- clean, dry, and intact   Diet: Reg diet   LDA: L PIV- SL  On IV ATB   Equipment: IV pole and monitor    Plan: CT of chest W/O contrast in AM   Additional Info:                               Problem: Adult Inpatient Plan of Care  Goal: Plan of Care Review  Description: The Plan of Care Review/Shift note should be completed every shift.  The Outcome Evaluation is a brief statement about your assessment that the patient is improving, declining, or no change.  This information will be displayed automatically on your shift  note.  Outcome: Progressing  Goal: Patient-Specific Goal (Individualized)  Description: You can add care plan individualizations to a care plan. Examples of Individualization might be:  \"Parent requests to be called daily at 9am for status\", \"I have a hard time hearing out of my right ear\", or \"Do not touch me to wake me up as it startles  me\".  Outcome: Progressing  Goal: Absence of Hospital-Acquired Illness or Injury  Outcome: Progressing  Intervention: Prevent and Manage VTE (Venous Thromboembolism) Risk  Recent Flowsheet Documentation  Taken 11/1/2024 0013 by Kedar Valdez RN  VTE Prevention/Management: (ambulates frequently)   SCDs off (sequential compression devices)   other (see comments)  Intervention: Prevent Infection  Recent Flowsheet Documentation  Taken 11/1/2024 0013 by Kedar Valdez RN  Infection Prevention: hand hygiene promoted  Goal: " Optimal Comfort and Wellbeing  Outcome: Progressing  Goal: Readiness for Transition of Care  Outcome: Progressing     Problem: Skin Injury Risk Increased  Goal: Skin Health and Integrity  Outcome: Progressing  Intervention: Plan: Nurse Driven Intervention: Moisture Management  Recent Flowsheet Documentation  Taken 10/31/2024 1956 by Kedar Valdez, RN  Moisture Interventions: Encourage regular toileting  Bathing/Skin Care: linen changed  Intervention: Optimize Skin Protection  Recent Flowsheet Documentation  Taken 11/1/2024 0013 by Kedar Valdez, RN  Activity Management:   activity adjusted per tolerance   ambulated to bathroom   back to bed   up ad osmar

## 2024-11-01 NOTE — CONSULTS
[facility]   Pulmonary   Consult Note 11/1/2024  Jose Luis Parker MRN: [id number]    Reason for Consult:   Chest tube management     Assessment & Plan    74 year old male with no contributory PMH who presented 10/28/24 for chest pain and cough, found to have empyema following IR chest tube placement 10/30/24. Pulmonology is consulted for chest tube management.    #Empyema  Loculated empyema likely given fluid appearance, WBC count >100,000, LDH >2500. Unusual that nothing has grown on fluid gram stain/culture, but he has been receiving antibiotics. One pocket was drained by chest tube placed by IR 10/30/24. However, a large walled off pocket of fluid remains which is awkwardly positioned centrally in the pleural space and likely inaccessible by another chest tube. Patient should not discharge as long as empyema remains. Will start by trying alteplase/dornase BID x3 days to see if we can get it to drain. We also talked with thoracic surgery team who would be willing to get involved if lytics fail to drain adequately.    Recommendations:    - Intrapleural alteplase/dornase BID with 1hr instillation time x6 doses (ordered)   - Chest tube management:    - Keep tube to suction between lytic doses.  - Flush chest tube Qshift    - Avoid kinks in tube   - Repeat CT chest pending drainage over the weekend.   - Thoracic surgery consult if lytics fail to adequately drain.   - Await culture results   - Consider transfer to [facility] given pulmonology and potential thoracic surgery involvement/      Thank you for allowing us to care for this patient.  We will continue to follow.  Please don't hesitate to page or call with any questions or concerns.    Patient seen & discussed w/  Dr. Ayoub, who agrees with the above assessment and plan.    Brian Greenfield M.D.  Pulmonary and Critical Care Medicine Fellow  11/1/2024           History of Present Illness:   Jose Luis Parker is a 74 year old male  "with no contributory PMH who presented 10/28/24 for chest pain and cough, found to have empyema following IR chest tube placement 10/30/24. Pulmonology is consulted for chest tube management.    Jose Luis has been feeling unwell for a few weeks prior to presentation. He was initially seen in urgent care a few weeks ago with severe right-sided chest pain, cough, and low-grade fever. He was given a 10-day course of Augmentin. His fever initially went away, but returned on and off later in his antibiotic course. He returned to urgent care 10/27/24 due to persistent symptoms with CXR showing persistent right basilar opacity, radiology recommending CT for further evaluation. Was ultimately admitted the next day for CT showing loculated pleural effusion and need for IV antibiotics. Patient underwent IR chest tube placement 10/30/24.     Jose Luis is currently feeling quite well. Since having the empyema partially drained, he has had improvement in his chest pain and fevers. He still has some cough. He denies history of aspiration. He says he was supposed to have a tooth removed recently but hasn't followed through yet. He also notes swelling of his left knee that is new since he has been in the hospital which he says might be related to heavy use when gardening. He is hoping to be able to leave the hospital soon.    Patient has no significant tobacco or other inhaled substance use history. He is currently retired but previously worked in sales and denies significant inhalational occupational exposure. He notes he is a heavy . He was previously told he had a \"fungal infection\" but this may have been related to his pulmonary emboli history.          Review of Symptoms:   10-point ROS reviewed, & found negative w/ exceptions noted in the HPI.          Past Medical History:     No past medical history on file.    Past Surgical History:   Procedure Laterality Date    IR CHEST TUBE PLACEMENT NON-TUNNELED RIGHT  10/30/2024 "            Allergies:     Allergies   Allergen Reactions    Levofloxacin Muscle Pain (Myalgia)             Outpatient Medications:     Current Facility-Administered Medications   Medication Dose Route Frequency Provider Last Rate Last Admin    acetaminophen (TYLENOL) tablet 650 mg  650 mg Oral Q4H PRN Urbano Patricia MD   650 mg at 10/28/24 2114    Or    acetaminophen (TYLENOL) Suppository 650 mg  650 mg Rectal Q4H PRN Urbano Patricia MD        alteplase (ACTIVASE) 10 mg, dornase ofelia (PULMOZYME) 5 mg in sodium chloride 0.9 % 50 mL for chest tube instillation in syringe  50 mL Chest Tube BID Brian Greenfield MD        apixaban ANTICOAGULANT (ELIQUIS) tablet 2.5 mg  2.5 mg Oral BID Phil Johnson MD   2.5 mg at 11/01/24 0813    benzocaine-menthol (CHLORASEPTIC) 6-10 MG lozenge 1 lozenge  1 lozenge Buccal Q1H PRN Nano Hayes MD        benzonatate (TESSALON) capsule 100 mg  100 mg Oral TID PRN Urbano Patricia MD        bisacodyl (DULCOLAX) suppository 10 mg  10 mg Rectal Daily PRN Urbano Patricia MD        calcium carbonate (TUMS) chewable tablet 1,000 mg  1,000 mg Oral 4x Daily PRN Urbano Patricia MD        guaiFENesin (ROBITUSSIN) 20 mg/mL solution 200 mg  200 mg Oral Q4H PRN Urbano Patricia MD        hydroxyurea (HYDREA) capsule 500 mg  500 mg Oral BID Urbano Patricia MD   500 mg at 11/01/24 0812    lidocaine (LMX4) cream   Topical Q1H PRN Urbano Patricia MD        lidocaine 1 % 0.1-1 mL  0.1-1 mL Other Q1H PRN Urbano Patricia MD        melatonin tablet 1 mg  1 mg Oral At Bedtime PRN Urbano Patricia MD        ondansetron (ZOFRAN ODT) ODT tab 4 mg  4 mg Oral Q6H PRN Urbano Patricia MD        Or    ondansetron (ZOFRAN) injection 4 mg  4 mg Intravenous Q6H PRN Urbano Patricia MD        piperacillin-tazobactam (ZOSYN) 3.375 g vial to attach to  mL bag  3.375 g Intravenous Q6H Nano Hayes MD   3.375 g at 11/01/24 1033    polyethylene glycol (MIRALAX) Packet 17 g  17 g  "Oral BID PRN Urbano Patricia MD        senna-docusate (SENOKOT-S/PERICOLACE) 8.6-50 MG per tablet 1 tablet  1 tablet Oral BID PRN Urbano Patricia MD        Or    senna-docusate (SENOKOT-S/PERICOLACE) 8.6-50 MG per tablet 2 tablet  2 tablet Oral BID PRN Urbano Patricia MD        sodium chloride (PF) 0.9% PF flush 3 mL  3 mL Intracatheter Q8H Marcus Iniguez PA-C   3 mL at 11/01/24 1040    sodium chloride (PF) 0.9% PF flush 3 mL  3 mL Intracatheter q1 min prn Marcus Iniguez PA-C   3 mL at 10/31/24 0429    sodium chloride (PF) 0.9% PF flush 3 mL  3 mL Intracatheter Q8H Urbano Patricia MD   3 mL at 11/01/24 0816    sodium chloride (PF) 0.9% PF flush 3 mL  3 mL Intracatheter q1 min prn Urbano Patricia MD                 Family History:   No family history on file.            Social History:     Social History     Tobacco Use    Smoking status: Never    Smokeless tobacco: Never   Vaping Use    Vaping status: Never Used             Physical Exam:   /77 (BP Location: Right arm)   Pulse 82   Temp 97.6  F (36.4  C) (Oral)   Resp 18   Ht 1.778 m (5' 10\")   Wt 65.1 kg (143 lb 9.6 oz)   SpO2 96%   BMI 20.60 kg/m      General: Alert, conversational, no apparent distress  Head: Normocephalic, atraumatic  Eyes: EOM grossly intact, sclera anicteric, no conjunctivitis  ENT: Trachea midline  Cardiovascular: Normal S1 and S2 with regular rate and rhythm, no murmurs, no friction rub  Pulmonary: Lungs clear to auscultation across bilateral anterior lung fields, no crackles, no wheezes  Abdomen: Soft, nontender, nondistended, +BS  Musculoskeletal: Left knee noticeably more swollen than right. Both knees are warm without erythema.  Neuro: Alert and oriented x3,speech is clear and fluent, CNII through XII grossly intact bilaterally   Psychiatric: Reactive affect, good personal hygiene, no visual or auditory hallucinations,          Data:   Labs (all laboratory studies reviewed by me): notable labs in " HPI above.    Imaging and other diagnostic testing (all imaging studies reviewed by me)    CT chest 11/1/2024:  IMPRESSION:   1. Decreasing volume of loculated right subpulmonic loculated empyema,  with stable position of right basilar chest tube. Stable appearance of  right basilar pleural effusion.  2. Unchanged appearance of right upper lobe soft tissue mass measuring  up to 4 cm in transverse orientation. Concerning for malignancy.  3. Layering debris in the thoracic trachea. Consider clinical  evaluation for aspiration risk.

## 2024-11-01 NOTE — PROGRESS NOTES
[facility]    Medicine Progress Note - Hospitalist Service, GOLD TEAM 21    Date of Admission:  10/28/2024    Assessment & Plan   Jose Luis Parker is a 74 year old male with a  history of chronic myeloproliferative disorder, LLE DVT and bilateral PE (2017) who presents to  ED for evaluation of a cough. Two weeks ago the patient began experiencing fever and a cough that persisted.  Patient returned to urgent care and was prescribed a 10-day course of Augmentin, 10 days ago.  With past several days the patient states his fever has improved and his cough has lessened.  Patient returned to urgent care yesterday where chest x-ray showed persistent indeterminant opacity at the medial right lung base.  Patient again returned to his PCP today for CT chest which was concerning for large subpulmonic, complex effusion in the right upper lung base.    Changes today:  - s/p IR chest tube placement 10/30/24.    - repeat CT chest today with decompression of one collection, but persistence of another and it is unlikely this will be easily accessed with a new chest tube.   - seen with pulmonary today, will trial lytics. Given appearance of remaining collection/empyema on imaging, patient may need transfer to [place] for thoracic surgery consultation and surgical removal.  - IP to consider timing of nodule biopsy  - continue current antimicrobials - cultures remain no growth (expected with previous abx course prior to presentation)     Acute problems  #Right lower lobe pneumonia  #Multilobed loculated right side pleural effusion, empyema  Patient had 2 weeks of fever and green mucoid cough recently treated with 10 days of Augmentin. He followed up in urgent care 10/27, where a chest xray showed persistent opacities in the right lung base. He was instructed to follow up the next day (10/28) for a chest CT for further evaluation, which showed multiple pathologic findings. There was  a tubular opacity in the right upper lobe suspicious for a primary lung malignancy. There is also a complex, bilobed effusion in the right lung base, with inflammator stranding and trace pericardial effusion. There is also a trace loculated effusion posterolaterally in the right middle and lower lobes.   -IR, pulm consult, appreciate recs  -Continue zosyn for broad spectrum coverage.   -Follow up blood and sputum cultures, NG thus far      #Concern for primary lung malignancy   Noted RUL 3 cm mass during course of work up. Cytology from pleural fluid was negative for malignancy thus far.  - IP following for consideration of biopsy      Chronic Problems  #Hx of myeloproliferative disorder-continue PTA hydroxyurea 500mg BID and apixiban 2.5 mg BID  #Hx of LLE and BL PE (2017)-held PTA apixaban for chest tube placement  #Macrocytic Anemia  Hypersegmented neutrophils present on 10/28 peripheral smear        Diet: Regular Diet Adult    DVT Prophylaxis: DOAC  De La Cruz Catheter: Not present  Lines: None     Cardiac Monitoring: None  Code Status: Full Code      Clinically Significant Risk Factors               # Hypoalbuminemia: Lowest albumin = 2.6 g/dL at 10/30/2024 11:15 AM, will monitor as appropriate                          Disposition Plan     Medically Ready for Discharge: Anticipated in 2-4 Days             Phil Johnson MD  Hospitalist Service, GOLD TEAM   [facility]  Securely message with [employer] (more info)  Text page via [employer] Paging/Directory   See signed in provider for up to date coverage information  ______________________________________________________________________    Interval History   Doing well overnight. Has been having moderate output. Some cough, but no SOB.  No fevers or chills. Agreeable and understanding of plan.    Physical Exam   Vital Signs: Temp: 97.6  F (36.4  C) Temp src: Oral BP: 137/77 Pulse: 82   Resp: 18 SpO2: 96 % O2 Device: None  (Room air)    Weight: 143 lbs 9.6 oz    General: alert, no distress  Cards: No murmurs, rubs, gallops. Regular rate and rhythm   Pulm: No crackles, wheezes, rales. Good aeration  Abd: Soft, nontender, nondistended, nontympanitic.    Skin: no rashes, lesions   Neuro: No focal deficits noted     Medical Decision Making       MANAGEMENT DISCUSSED with the following over the past 24 hours: pulmonary       Data     I have personally reviewed the following data over the past 24 hrs:    28.9 (H)  \   11.0 (L)   / 418     137 105 18.6 /  92   4.2 22 1.23 (H) \       Imaging results reviewed over the past 24 hrs:   Recent Results (from the past 24 hours)   CT Chest w/o Contrast    Narrative    EXAMINATION: Chest CT  11/1/2024 11:05 AM    CLINICAL HISTORY: follow up empyema    COMPARISON: CT PE, 10/28/2024    TECHNIQUE: CT imaging obtained through the chest without contrast.  Axial, coronal, and sagittal reconstructions and axial MIP reformatted  images are reviewed.     FINDINGS:  Lungs: Layering debris in the thoracic trachea. The central  tracheobronchial tree is otherwise patent. Decreased volume of right  subpulmonic loculated empyema with right basilar pigtail catheter in  unchanged position, correlated with chest radiograph dated 10/31/2024.  Similar appearance of right basilar pleural effusion. No left pleural  effusion. No pneumothorax. Unchanged soft tissue density mass within  the right upper lobe, which measures up to 4 cm in transverse  orientation (series 3 images 96 through 121).    Mediastinum: The thyroid is unremarkable. Cardiac size is normal.  Normal caliber aorta and main pulmonary artery. No pericardial  effusion. Mild-to-moderate coronary artery calcifications. No thoracic  lymphadenopathy. Esophagus is normal in caliber.    Bones and soft tissues: Stable T8 compression deformity. No  significant neuroforaminal or spinal canal stenosis is suspected. No  suspicious osseous lesion is identified.  Unremarkable appearance of  the superficial soft tissues.     Upper Abdomen: Simple hepatic cyst in the peripheral right hepatic  lobe. Hepatomegaly with hypoattenuation of the hepatic parenchyma the  visualized abdomen is otherwise unremarkable.      Impression    IMPRESSION:   1. Decreasing volume of loculated right subpulmonic loculated empyema,  with stable position of right basilar chest tube. Stable appearance of  right basilar pleural effusion.  2. Unchanged appearance of right upper lobe soft tissue mass measuring  up to 4 cm in transverse orientation. Concerning for malignancy.  3. Layering debris in the thoracic trachea. Consider clinical  evaluation for aspiration risk.    I have personally reviewed the examination and initial interpretation  and I agree with the findings.    MAREN WATTERS MD         SYSTEM ID:  [id number]

## 2024-11-01 NOTE — PLAN OF CARE
"Goal Outcome Evaluation:     Plan of Care Reviewed With: patient     Overall Patient Progress: improving    Shift: 9299-0284    VS: /77 (BP Location: Right arm)   Pulse 82   Temp 97.6  F (36.4  C) (Oral)   Resp 18   Ht 1.778 m (5' 10\")   Wt 65.1 kg (143 lb 9.6 oz)   SpO2 96%   BMI 20.60 kg/m       Patient A&Ox4. On RA. Denies sob/chest pain. Independent in the room. Chest tube to suction at -20 mmHg with continuous wall suction in place. Changed chest tube incision dressing per orders. Next dressing change due 11/2. Plan to transfer patient to [place] when bed is available. Call light within reach; able to make needs known. Plan of care ongoing.      "

## 2024-11-02 LAB — BACTERIA BLD CULT: NO GROWTH

## 2024-11-02 PROCEDURE — 250N000013 HC RX MED GY IP 250 OP 250 PS 637: Performed by: STUDENT IN AN ORGANIZED HEALTH CARE EDUCATION/TRAINING PROGRAM

## 2024-11-02 PROCEDURE — 120N000002 HC R&B MED SURG/OB UMMC

## 2024-11-02 PROCEDURE — 250N000011 HC RX IP 250 OP 636: Mod: JZ | Performed by: STUDENT IN AN ORGANIZED HEALTH CARE EDUCATION/TRAINING PROGRAM

## 2024-11-02 PROCEDURE — 250N000013 HC RX MED GY IP 250 OP 250 PS 637

## 2024-11-02 PROCEDURE — 99232 SBSQ HOSP IP/OBS MODERATE 35: CPT | Mod: FS | Performed by: STUDENT IN AN ORGANIZED HEALTH CARE EDUCATION/TRAINING PROGRAM

## 2024-11-02 PROCEDURE — 250N000009 HC RX 250: Performed by: STUDENT IN AN ORGANIZED HEALTH CARE EDUCATION/TRAINING PROGRAM

## 2024-11-02 PROCEDURE — 258N000003 HC RX IP 258 OP 636: Performed by: STUDENT IN AN ORGANIZED HEALTH CARE EDUCATION/TRAINING PROGRAM

## 2024-11-02 PROCEDURE — 250N000011 HC RX IP 250 OP 636: Performed by: STUDENT IN AN ORGANIZED HEALTH CARE EDUCATION/TRAINING PROGRAM

## 2024-11-02 PROCEDURE — 99207 PR APP CREDIT; MD BILLING SHARED VISIT: CPT

## 2024-11-02 PROCEDURE — 99233 SBSQ HOSP IP/OBS HIGH 50: CPT | Mod: GC | Performed by: INTERNAL MEDICINE

## 2024-11-02 RX ORDER — MULTIPLE VITAMINS W/ MINERALS TAB 9MG-400MCG
1 TAB ORAL DAILY
Status: DISCONTINUED | OUTPATIENT
Start: 2024-11-02 | End: 2024-11-05 | Stop reason: HOSPADM

## 2024-11-02 RX ADMIN — PIPERACILLIN AND TAZOBACTAM 3.38 G: 3; .375 INJECTION, POWDER, LYOPHILIZED, FOR SOLUTION INTRAVENOUS at 15:50

## 2024-11-02 RX ADMIN — APIXABAN 2.5 MG: 2.5 TABLET, FILM COATED ORAL at 19:18

## 2024-11-02 RX ADMIN — HYDROXYUREA 500 MG: 500 CAPSULE ORAL at 07:45

## 2024-11-02 RX ADMIN — APIXABAN 2.5 MG: 2.5 TABLET, FILM COATED ORAL at 07:45

## 2024-11-02 RX ADMIN — ACETAMINOPHEN 650 MG: 325 TABLET, FILM COATED ORAL at 07:44

## 2024-11-02 RX ADMIN — PIPERACILLIN AND TAZOBACTAM 3.38 G: 3; .375 INJECTION, POWDER, LYOPHILIZED, FOR SOLUTION INTRAVENOUS at 09:38

## 2024-11-02 RX ADMIN — DORNASE ALFA 50 ML: 1 SOLUTION RESPIRATORY (INHALATION) at 19:26

## 2024-11-02 RX ADMIN — DORNASE ALFA 50 ML: 1 SOLUTION RESPIRATORY (INHALATION) at 09:32

## 2024-11-02 RX ADMIN — Medication 1 TABLET: at 09:44

## 2024-11-02 RX ADMIN — PIPERACILLIN AND TAZOBACTAM 3.38 G: 3; .375 INJECTION, POWDER, LYOPHILIZED, FOR SOLUTION INTRAVENOUS at 04:51

## 2024-11-02 RX ADMIN — DORNASE ALFA 50 ML: 1 SOLUTION RESPIRATORY (INHALATION) at 00:00

## 2024-11-02 RX ADMIN — PIPERACILLIN AND TAZOBACTAM 3.38 G: 3; .375 INJECTION, POWDER, LYOPHILIZED, FOR SOLUTION INTRAVENOUS at 21:12

## 2024-11-02 RX ADMIN — HYDROXYUREA 500 MG: 500 CAPSULE ORAL at 19:33

## 2024-11-02 NOTE — PROGRESS NOTES
"[facility] Pulmonology Follow up    Jose Luis Parker  MRN: [id number]  : 1950    Date of Admission: 10/28/2024  Date of Service: 2024    Assessment:  # RLL PNA  # R Empyema  Chest tube draining renzo pus, WBC count >100,000, LDH >2500. Cultures negative to date. Received first dose of intrapleural lytics  at 0000 and has had about 250 out since then. Continue course of lytics and will plan for repeat CT of the chest early next week. If loculated empyema remains will need to discuss with thoracic surgery.    # RUL 3 cm pulmonary mass: will need work up, follow up with interventional pulmonary in the outpatient setting.    Recommendations:  Continue intrapleural lytics Q12H   Clamp/dwell lytics for 1 hour after instilling into pleural space, then return chest tube to suction  Okay for chest tube to be to water seal intermittently so pt can take walks, go to the bathroom, etc.  Follow micro data     Jyothi Bryant MD   Pulmonary fellow  Pager: [phone]    Patient seen and discussed with staff Dr Perlman.     Subjective:    Feeling well today. A bit of rib/chest wall pain around the site of the chest tube, tylenol helping. Has been up on his feet today to use the bathroom. Appetite is good.    Review of systems: focused ROS as above     Objective:    /79 (BP Location: Right arm, Cuff Size: Adult Small)   Pulse 78   Temp 98.1  F (36.7  C) (Oral)   Resp 18   Ht 1.778 m (5' 10\")   Wt 65.1 kg (143 lb 9.6 oz)   SpO2 98%   BMI 20.60 kg/m      Gen: in no acute distress, sitting upright in the chair  HEENT: MMM  Pulm: chest tube dressing c/d/I, chest tube to suction with ~250 ml purulent dark red material in the canister. No air leak.  GI: not distended  MSK: no gross deformities, no joint swelling  Integ: no rashes or lesions appreciated  Neuro: speech clear, alert and oriented  Psych: calm, appropriate    Data:  I have personally reviewed today's CXR and micro data to " date.

## 2024-11-02 NOTE — PROGRESS NOTES
"Blood pressure (!) 141/73, pulse 83, temperature 98.4  F (36.9  C), temperature source Oral, resp. rate 18, height 1.778 m (5' 10\"), weight 65.1 kg (143 lb 9.6 oz), SpO2 97%.    Activity: SBA  Neuros:  AOX4, makes needs known  Cardiac: WDL  Respiratory: WDL CT -20 cm w/serosanguineous output  GI/: AUOP, BM  Diet: Regular, tolerating  Skin/Incisions/Drains: CT, PIV  Lines: Lt PIV w/abx  Pain: Denies pain  Plan: tPA infused into chest tube 30cc output    "

## 2024-11-02 NOTE — PROGRESS NOTES
[facility]    Medicine Progress Note - Hospitalist Service, GOLD TEAM 4    Date of Admission:  10/28/2024    Assessment & Plan   Jose Luis Parker is a 74 year old male with PMH of chronic myeloproliferative disorder, LLE DVT, and bilateral PE (2017) was admitted 10/28/24 with loculated right-sided pleural effusion.      Updates 11/02:  - Intrapleural alteplase/dornase BID with 1hr instillation time x6 doses, started 11/01 in evening  - Continue current antimicrobials - cultures remain no growth (expected with previous abx course prior to presentation)     # Right lower lobe pneumonia  # Multilobe loculated right side pleural effusion, empyema  Two weeks PTA, pt had 2 weeks of fever and green mucoid cough. Pt seen in , prescribed 10 days of Augmentin. While on Augmentin, pt's fever had improved and cough lessened. Patient returned to  10/27 where CXR showed persistent indeterminant opacity at the medial right lung base. Instructed to follow up 10/28 for chest CT which showed multiple pathologic findings - 1) a tubular opacity in the RUL c/f primary lung malignancy 2) complex, bi-lobed effusion in the RLL, with inflammatory stranding and trace pericardial effusion and 3) trace loculated effusion posterolaterally in the RML and RLL.   - Pulmonology consulted   - Chest tube placed    - Keep tube to suction between lytic doses.  - Flush chest tube Qshift                          - Avoid kinks in tube  - Intrapleural alteplase/dornase BID with 1hr instillation time x6 doses   - Repeat CT chest pending drainage over the weekend.  - Thoracic surgery consult if lytics fail to adequately drain.  - Await culture results  - Continue Zosyn for broad spectrum coverage.   - Follow up blood and sputum cultures, NG thus far     # Concern for primary lung malignancy   Noted RUL 3 cm mass during course of work up. Cytology from pleural fluid was negative for malignancy thus  far.  - IP following for consideration of biopsy      # Hx of myeloproliferative disorder: Continue PTA hydroxyurea 500 mg BID and apixiban 2.5 mg BID  # Hx of LLE and BL PE (2017): Held PTA apixaban for chest tube placement  # Macrocytic anemia: Hypersegmented neutrophils present on 10/28 peripheral smear         Diet: Regular Diet Adult    DVT Prophylaxis: DOAC  De La Cruz Catheter: Not present  Lines: None     Cardiac Monitoring: None  Code Status: Full Code      Clinically Significant Risk Factors               # Hypoalbuminemia: Lowest albumin = 2.6 g/dL at 10/30/2024 11:15 AM, will monitor as appropriate                        Disposition Plan     Medically Ready for Discharge: Anticipated in 2-4 Days       The patient's care was discussed with the Attending Physician, Dr. Torres, Bedside Nurse, Patient, Patient's Family, and Pulm Team.    Morena Juares PA-C  Hospitalist Service, GOLD TEAM [address] [place] [address]  Securely message with [employer] (more info)  Text page via [facility] Paging/Directory   See signed in provider for up to date coverage information  ______________________________________________________________________    Interval History   Doing well this morning. Seen with wife at bedside, eating breakfast. Pt very encouraged by increased output this morning via chest tube. Some cough, but no SOB. No reported fevers or chills. Agreeable and understanding of plan.    Physical Exam   Vital Signs: Temp: 97.7  F (36.5  C) Temp src: Oral BP: 120/70 Pulse: 78   Resp: 18 SpO2: 99 % O2 Device: None (Room air)    Weight: 143 lbs 9.6 oz    GEN: NAD. Appears stated age.  HEENT: Normocephalic, without obvious abnormality, atraumatic. Conjunctivae clear without exudates or hemorrhage. EOMs intact. Anicteric sclera. MMMs.  CV: RRR. No murmurs, rubs, or gallops. S1 and S2 are of normal intensity. No peripheral edema. Intact bilateral pedal pulses.  PULM: Respirations even and unlabored. No  signs of respiratory distress. Lung sounds clear to auscultation.   GI: Soft, non-tender, non-distended. Bowel sounds present. No rebound or guarding.  PSYCH: Appropriate mood and affect. No visual or auditory hallucinations.   HEME/LYMPH: No jaundice or pallor noted.  DERM: Clean, dry, and intact to exposed skin surfaces.     Medical Decision Making       60 MINUTES SPENT BY ME on the date of service doing chart review, history, exam, documentation & further activities per the note.      Data         Imaging results reviewed over the past 24 hrs:   No results found for this or any previous visit (from the past 24 hours).

## 2024-11-02 NOTE — PROGRESS NOTES
TRANSFER OFF    D: Reason for transfer to  ROOM [address] [place] was potential thoracic surgery involvement. Condition of patient prior to transfer was stable, independent, on R/A. Chest tube connected to potable continuous suction.     I: Report called to Jacinda VALDEZ . Transferred to  at 1100 via [facility]. Family Nabila QUIÑONEZ Was notified. Chart and medications sent with patient-Yes medication was tPA Belongings also sent with patient.     Shift Report: 2000 med alteplase (ACTIVASE) 10 mg, dornase ofelia (PULMOZYME) 5 mg in sodium chloride 0.9 % 50 mL for chest tube instillation in syringe- was not administered d/t RN critical Care Flyer was busy, was unable to get to the patient before transfer-  RN Jacinda was notified, 6 MS charge nurse aware.

## 2024-11-02 NOTE — PLAN OF CARE
"Goal Outcome Evaluation:      Plan of Care Reviewed With: patient    Overall Patient Progress: no change    Outcome Evaluation: continues to progress    Admitted/transferred from:   2 RN full   skin assessment completed by Jacinda Capone RN and Elsy Rajput RN.  Skin assessment finding: issues found few L foot blisters, back cysts (pt states he has had them for years)    Interventions/actions: other none       Bedside Emergency Equipment Present:  Suction Regulator: Yes  Suction Canister: Yes  Tubing between Regulator and Canister: Yes  O2 Regulator with Tree: Yes  Ambu Bag: Yes    Documentation of Language Proficiency Assessment:  Does the patient speak a language other than English at home? No   Have they had difficulty understanding or being understood in previous admissions or doctor visits? No   Do they have difficulty clearly explaining what brought them into the hospital? No   Do they show difficulty providing clear teach back about call light use? No     If the answer was \"yes,\" to more than one question, was an  utilized for the remainder of the admission assessment? No    If no, please explain:      Neuro:A/Ox4  Respiratory:WDL on RA  Cardiac:WDL. Denies chest pain  Diet:reg  GI/:voiding spontaneously, passing gas   Incision/Drains:  R CT to -20cm suction , 192ml purulent drainage overnight  IV Access:L PIV infusing intermittent abx  Pain:denies   VS:Temp: 97.7  F (36.5  C) Temp src: Oral BP: 120/70 Pulse: 78   Resp: 18 SpO2: 99 % O2 Device: None (Room air)     Activity:ind      New Changes this shift:none   Plan: continue POC  "

## 2024-11-02 NOTE — PROGRESS NOTES
Brief Thoracic Note    Patient's case reviewed with Dr. Fenton after discussion with pulmonology team on 11/1/2024.  Agree with the recommendations for dornase treatment.  At this time, there is no surgical intervention and we do feel that he has a reasonably good chance at resolving his empyema with lytic therapy.  If this does not occur after 3 days of lytics, please consult thoracic surgery at that time.    Ramirez Louise MD  General Surgery, PGY-1  1:10 PM 11/02/24    ** For on call schedules and contact information, please refer to [facility] **

## 2024-11-02 NOTE — PLAN OF CARE
Goal Outcome Evaluation:Patient requested Tylenol for right side discomfort with relief.Ambulated halls,gait steady,denied dyspnea with activity.Tolerated food.fluid intake,Voiding,stool X 2.Received Altepase insertion per orders. Continue to monitor

## 2024-11-03 ENCOUNTER — APPOINTMENT (OUTPATIENT)
Dept: GENERAL RADIOLOGY | Facility: CLINIC | Age: 74
DRG: 177 | End: 2024-11-03
Attending: STUDENT IN AN ORGANIZED HEALTH CARE EDUCATION/TRAINING PROGRAM
Payer: COMMERCIAL

## 2024-11-03 LAB
ALBUMIN SERPL BCG-MCNC: 3 G/DL (ref 3.5–5.2)
ALP SERPL-CCNC: 140 U/L (ref 40–150)
ALT SERPL W P-5'-P-CCNC: 7 U/L (ref 0–70)
ANION GAP SERPL CALCULATED.3IONS-SCNC: 11 MMOL/L (ref 7–15)
AST SERPL W P-5'-P-CCNC: 14 U/L (ref 0–45)
BASOPHILS # BLD AUTO: 0.3 10E3/UL (ref 0–0.2)
BASOPHILS NFR BLD AUTO: 1 %
BILIRUB SERPL-MCNC: 0.7 MG/DL
BUN SERPL-MCNC: 21.2 MG/DL (ref 8–23)
CALCIUM SERPL-MCNC: 8.4 MG/DL (ref 8.8–10.4)
CHLORIDE SERPL-SCNC: 105 MMOL/L (ref 98–107)
CREAT SERPL-MCNC: 1.33 MG/DL (ref 0.67–1.17)
EGFRCR SERPLBLD CKD-EPI 2021: 56 ML/MIN/1.73M2
EOSINOPHIL # BLD AUTO: 0.3 10E3/UL (ref 0–0.7)
EOSINOPHIL NFR BLD AUTO: 1 %
ERYTHROCYTE [DISTWIDTH] IN BLOOD BY AUTOMATED COUNT: 14.1 % (ref 10–15)
GLUCOSE SERPL-MCNC: 94 MG/DL (ref 70–99)
HCO3 SERPL-SCNC: 22 MMOL/L (ref 22–29)
HCT VFR BLD AUTO: 34.5 % (ref 40–53)
HGB BLD-MCNC: 11.2 G/DL (ref 13.3–17.7)
IMM GRANULOCYTES # BLD: 0.3 10E3/UL
IMM GRANULOCYTES NFR BLD: 1 %
LYMPHOCYTES # BLD AUTO: 2.1 10E3/UL (ref 0.8–5.3)
LYMPHOCYTES NFR BLD AUTO: 8 %
MCH RBC QN AUTO: 39.9 PG (ref 26.5–33)
MCHC RBC AUTO-ENTMCNC: 32.5 G/DL (ref 31.5–36.5)
MCV RBC AUTO: 123 FL (ref 78–100)
MONOCYTES # BLD AUTO: 0.6 10E3/UL (ref 0–1.3)
MONOCYTES NFR BLD AUTO: 2 %
NEUTROPHILS # BLD AUTO: 23.6 10E3/UL (ref 1.6–8.3)
NEUTROPHILS NFR BLD AUTO: 87 %
NRBC # BLD AUTO: 0 10E3/UL
NRBC BLD AUTO-RTO: 0 /100
PLATELET # BLD AUTO: 406 10E3/UL (ref 150–450)
POTASSIUM SERPL-SCNC: 3.9 MMOL/L (ref 3.4–5.3)
PROT SERPL-MCNC: 7.7 G/DL (ref 6.4–8.3)
RBC # BLD AUTO: 2.81 10E6/UL (ref 4.4–5.9)
SODIUM SERPL-SCNC: 138 MMOL/L (ref 135–145)
WBC # BLD AUTO: 27.1 10E3/UL (ref 4–11)

## 2024-11-03 PROCEDURE — 250N000009 HC RX 250

## 2024-11-03 PROCEDURE — 36415 COLL VENOUS BLD VENIPUNCTURE: CPT

## 2024-11-03 PROCEDURE — 250N000011 HC RX IP 250 OP 636: Performed by: STUDENT IN AN ORGANIZED HEALTH CARE EDUCATION/TRAINING PROGRAM

## 2024-11-03 PROCEDURE — 250N000011 HC RX IP 250 OP 636: Mod: JZ

## 2024-11-03 PROCEDURE — 71045 X-RAY EXAM CHEST 1 VIEW: CPT | Mod: 26 | Performed by: RADIOLOGY

## 2024-11-03 PROCEDURE — 250N000011 HC RX IP 250 OP 636: Mod: JZ | Performed by: STUDENT IN AN ORGANIZED HEALTH CARE EDUCATION/TRAINING PROGRAM

## 2024-11-03 PROCEDURE — 99233 SBSQ HOSP IP/OBS HIGH 50: CPT | Mod: GC | Performed by: INTERNAL MEDICINE

## 2024-11-03 PROCEDURE — 82310 ASSAY OF CALCIUM: CPT

## 2024-11-03 PROCEDURE — 250N000013 HC RX MED GY IP 250 OP 250 PS 637

## 2024-11-03 PROCEDURE — 250N000009 HC RX 250: Performed by: STUDENT IN AN ORGANIZED HEALTH CARE EDUCATION/TRAINING PROGRAM

## 2024-11-03 PROCEDURE — 258N000003 HC RX IP 258 OP 636: Performed by: STUDENT IN AN ORGANIZED HEALTH CARE EDUCATION/TRAINING PROGRAM

## 2024-11-03 PROCEDURE — 85025 COMPLETE CBC W/AUTO DIFF WBC: CPT

## 2024-11-03 PROCEDURE — 999N000128 HC STATISTIC PERIPHERAL IV START W/O US GUIDANCE

## 2024-11-03 PROCEDURE — 99232 SBSQ HOSP IP/OBS MODERATE 35: CPT | Mod: FS | Performed by: STUDENT IN AN ORGANIZED HEALTH CARE EDUCATION/TRAINING PROGRAM

## 2024-11-03 PROCEDURE — 250N000013 HC RX MED GY IP 250 OP 250 PS 637: Performed by: STUDENT IN AN ORGANIZED HEALTH CARE EDUCATION/TRAINING PROGRAM

## 2024-11-03 PROCEDURE — 99207 PR APP CREDIT; MD BILLING SHARED VISIT: CPT

## 2024-11-03 PROCEDURE — 250N000011 HC RX IP 250 OP 636

## 2024-11-03 PROCEDURE — 258N000003 HC RX IP 258 OP 636

## 2024-11-03 PROCEDURE — 3E0L3GC INTRODUCTION OF OTHER THERAPEUTIC SUBSTANCE INTO PLEURAL CAVITY, PERCUTANEOUS APPROACH: ICD-10-PCS | Performed by: STUDENT IN AN ORGANIZED HEALTH CARE EDUCATION/TRAINING PROGRAM

## 2024-11-03 PROCEDURE — 120N000002 HC R&B MED SURG/OB UMMC

## 2024-11-03 PROCEDURE — 71045 X-RAY EXAM CHEST 1 VIEW: CPT

## 2024-11-03 RX ADMIN — HYDROXYUREA 500 MG: 500 CAPSULE ORAL at 19:48

## 2024-11-03 RX ADMIN — Medication 1 TABLET: at 08:28

## 2024-11-03 RX ADMIN — HYDROXYUREA 500 MG: 500 CAPSULE ORAL at 08:31

## 2024-11-03 RX ADMIN — PIPERACILLIN AND TAZOBACTAM 3.38 G: 3; .375 INJECTION, POWDER, LYOPHILIZED, FOR SOLUTION INTRAVENOUS at 21:27

## 2024-11-03 RX ADMIN — DORNASE ALFA 50 ML: 1 SOLUTION RESPIRATORY (INHALATION) at 08:28

## 2024-11-03 RX ADMIN — APIXABAN 2.5 MG: 2.5 TABLET, FILM COATED ORAL at 19:18

## 2024-11-03 RX ADMIN — DORNASE ALFA 50 ML: 1 SOLUTION RESPIRATORY (INHALATION) at 19:18

## 2024-11-03 RX ADMIN — APIXABAN 2.5 MG: 2.5 TABLET, FILM COATED ORAL at 08:28

## 2024-11-03 RX ADMIN — PIPERACILLIN AND TAZOBACTAM 3.38 G: 3; .375 INJECTION, POWDER, LYOPHILIZED, FOR SOLUTION INTRAVENOUS at 10:11

## 2024-11-03 RX ADMIN — PIPERACILLIN AND TAZOBACTAM 3.38 G: 3; .375 INJECTION, POWDER, LYOPHILIZED, FOR SOLUTION INTRAVENOUS at 16:53

## 2024-11-03 RX ADMIN — PIPERACILLIN AND TAZOBACTAM 3.38 G: 3; .375 INJECTION, POWDER, LYOPHILIZED, FOR SOLUTION INTRAVENOUS at 04:08

## 2024-11-03 NOTE — PLAN OF CARE
Goal Outcome Evaluation:Patient denies pain.Tolerated Alteplase dose without apparent discomfort.ambulated halls,gait steady,denies dyspnea with activity.Chest tube -20cm, output without change. Continue to monitor

## 2024-11-03 NOTE — PROGRESS NOTES
[facility] Pulmonology Follow up    Jose Luis Parker  MRN: [id number]  : 1950    Date of Admission: 10/28/2024  Date of Service: 2024    Assessment:  # RLL Community acquired pneumonia  # R Empyema  Symptoms of PNA started 10/15, started on Augmentin 10/18 and completed a 10d course but symptoms did not improve. Outpatient CT imaging 10/28 revealed a complex pleural effusion so pt was sent to the hospital. He was started on Vanc/Zosyn, narrowed to Zosyn. Chest tube draining renzo pus, WBC count >100,000, LDH >2500. Cultures negative to date. Received first dose of intrapleural lytics  at 0000 and has had about 250 out since then. Continue course of lytics and will plan for repeat CT of the chest tomorrow. If loculated empyema remains will need to discuss with thoracic surgery.    # RUL 3 cm pulmonary mass: will need work up, follow up with interventional pulmonary in the outpatient setting.    Recommendations:  Continue intrapleural lytics Q12H   Clamp/dwell lytics for 1 hour after instilling into pleural space  Chest tube to continuous suction -20 mmHg  Okay for chest tube to be to water seal intermittently so pt can take walks, go to the bathroom, etc.  Follow micro data   CT chest w/o contrast tomorrow AM after last dose of lytics  Okay to switch from IV to oral antibiotics from pulmonary perspective  Would be hesitant of using Augmentin given that pt had a 10d course PTA and failed to improve; consider ID consult  Will need at least 4 weeks total of antibiotics (from start of Zosyn) with follow up to reassess symptoms    Jyothi Bryant MD   Pulmonary fellow  Pager: [phone]    Subjective:    Feeling good today. No fevers. Appetite is good. Out for a walk on this unit this AM. Minimal pain at chest tube site     Review of systems: focused ROS as above     Objective:    /75 (BP Location: Right arm)   Pulse 84   Temp 98.6  F (37  C) (Oral)   Resp 16   Ht 1.778 m  "(5' 10\")   Wt 65.1 kg (143 lb 9.6 oz)   SpO2 96%   BMI 20.60 kg/m      Gen: in no acute distress, sitting upright in the chair  HEENT: MMM  Pulm: chest tube clamped during my exam, lytics dwelling; ~400 ml purulent material in the canister. No air leak.  GI: not distended  MSK: no gross deformities, no joint swelling  Integ: no rashes or lesions appreciated  Neuro: speech clear, alert and oriented  Psych: calm, appropriate    Data:  I have personally reviewed micro data to date.  "

## 2024-11-03 NOTE — PROVIDER NOTIFICATION
7B Jose Luis Parker room 35 It has been 2.5 days since lab done on patient are we checking labs anymore. Juan Daniel

## 2024-11-03 NOTE — PLAN OF CARE
Goal Outcome Evaluation:      Plan of Care Reviewed With: patient    Overall Patient Progress: no change  Outcome Evaluation: continues to progress    Neuro:A/Ox4  Respiratory:WDL on RA  Cardiac:WDL. Denies chest pain  Diet:reg  GI/:voiding spontaneously, passing gas   Incision/Drains:  R CT to -20cm suction , 50 ml serosanguinous/brown drainage overnight  IV Access:L PIV SL  Pain:did not request pain meds overnight    VS:Temp: 98.6  F (37  C) Temp src: Oral BP: 139/75 Pulse: 84   Resp: 16 SpO2: 96 % O2 Device: None (Room air)      Activity:ind      New Changes this shift:none   Plan: continue POC

## 2024-11-03 NOTE — PROGRESS NOTES
[facility]    Medicine Progress Note - Hospitalist Service, GOLD TEAM 4    Date of Admission:  10/28/2024    Assessment & Plan   Jose Luis Parker is a 74 year old male with PMH of chronic myeloproliferative disorder, LLE DVT, and bilateral PE (2017) was admitted 10/28/24 with loculated right-sided pleural effusion.      Updates 11/03:  - Intrapleural alteplase/dornase BID with 1hr instillation time x6 doses, started 11/01 in evening (last dose 11/04 in AM)  - Continue current antimicrobials - cultures remain no growth (expected with previous abx course prior to presentation)  - Chest tube with 187 mL out on 11/1, 228 mL on 11/2     # Right lower lobe pneumonia  # Multilobe loculated right side pleural effusion, empyema  Two weeks PTA, pt had 2 weeks of fever and green mucoid cough. Pt seen in , prescribed 10 days of Augmentin. While on Augmentin, pt's fever had improved and cough lessened. Patient returned to  10/27 where CXR showed persistent indeterminant opacity at the medial right lung base. Instructed to follow up 10/28 for chest CT which showed multiple pathologic findings - 1) a tubular opacity in the RUL c/f primary lung malignancy 2) complex, bi-lobed effusion in the RLL, with inflammatory stranding and trace pericardial effusion and 3) trace loculated effusion posterolaterally in the RML and RLL.   - Pulmonology consulted   - Chest tube in place    - Keep tube to suction between lytic doses.  - Flush chest tube Qshift                          - Avoid kinks in tube  - Intrapleural alteplase/dornase BID with 1hr instillation time x6 doses, started 11/01 in evening (last dose 11/04 8 AM)  - Repeat CT chest w/o contrast 11/04 AM  - Thoracic surgery consult if lytics fail to adequately drain.  - Await culture results  - Continue Zosyn for broad spectrum coverage - will consult ID 11/4 in AM for oral antibiotic recommendation. Per pulm, will choose something  other than Augmentin due to failure to improve with 10 day course  - Follow up blood and sputum cultures, NG thus far     # Concern for primary lung malignancy   Noted RUL 3 cm mass during course of work up. Cytology from pleural fluid was negative for malignancy thus far.  - IP following for consideration of biopsy vs outpatient workup     # Hx of myeloproliferative disorder: Continue PTA hydroxyurea 500 mg BID and apixiban 2.5 mg BID  # Hx of LLE and BL PE (2017): Held PTA apixaban for chest tube placement  # Macrocytic anemia: Hypersegmented neutrophils present on 10/28 peripheral smear        Diet: Regular Diet Adult    DVT Prophylaxis: DOAC  De La Cruz Catheter: Not present  Lines: None     Cardiac Monitoring: None  Code Status: Full Code      Clinically Significant Risk Factors               # Hypoalbuminemia: Lowest albumin = 2.6 g/dL at 10/30/2024 11:15 AM, will monitor as appropriate                          Disposition Plan     Medically Ready for Discharge: Anticipated in 2-4 Days       The patient's care was discussed with the Attending Physician, Dr. Torres, Bedside Nurse, Patient, Patient's Family, and ID Team.    Morena Juares PA-C  Hospitalist Service, GOLD TEAM [address] [place] [address]  Securely message with [employer] (more info)  Text page via [facility] Paging/Directory   See signed in provider for up to date coverage information  ______________________________________________________________________    Interval History   Doing well this morning. Seen with wife at bedside, eating breakfast. Able to walk around the jha earlier this morning. Pt very encouraged by increased output via chest tube. Some cough, but no SOB. No reported fevers or chills. Agreeable and understanding of plan.    Physical Exam   Vital Signs: Temp: 98.6  F (37  C) Temp src: Oral BP: 139/75 Pulse: 84   Resp: 16 SpO2: 96 % O2 Device: None (Room air)    Weight: 143 lbs 9.6 oz    GEN: NAD. Appears stated  age.  HEENT: Normocephalic, without obvious abnormality, atraumatic. Conjunctivae clear without exudates or hemorrhage. EOMs intact. Anicteric sclera. MMMs.  CV: RRR. No murmurs, rubs, or gallops. S1 and S2 are of normal intensity. No peripheral edema. Intact bilateral pedal pulses.  PULM: Respirations even and unlabored. No signs of respiratory distress. Lung sounds clear to auscultation.   GI: Soft, non-tender, non-distended. Bowel sounds present. No rebound or guarding.  PSYCH: Appropriate mood and affect. No visual or auditory hallucinations.   HEME/LYMPH: No jaundice or pallor noted.  DERM: Clean, dry, and intact to exposed skin surfaces.     Medical Decision Making       60 MINUTES SPENT BY ME on the date of service doing chart review, history, exam, documentation & further activities per the note.      Data         Imaging results reviewed over the past 24 hrs:   No results found for this or any previous visit (from the past 24 hours).

## 2024-11-04 ENCOUNTER — APPOINTMENT (OUTPATIENT)
Dept: CT IMAGING | Facility: CLINIC | Age: 74
DRG: 177 | End: 2024-11-04
Payer: COMMERCIAL

## 2024-11-04 ENCOUNTER — TELEPHONE (OUTPATIENT)
Dept: HEMATOLOGY | Facility: CLINIC | Age: 74
End: 2024-11-04
Payer: COMMERCIAL

## 2024-11-04 LAB
ALBUMIN SERPL BCG-MCNC: 2.7 G/DL (ref 3.5–5.2)
ALP SERPL-CCNC: 121 U/L (ref 40–150)
ALT SERPL W P-5'-P-CCNC: <5 U/L (ref 0–70)
ANION GAP SERPL CALCULATED.3IONS-SCNC: 9 MMOL/L (ref 7–15)
AST SERPL W P-5'-P-CCNC: 12 U/L (ref 0–45)
BACTERIA PLR CULT: NO GROWTH
BASOPHILS # BLD AUTO: 0.3 10E3/UL (ref 0–0.2)
BASOPHILS NFR BLD AUTO: 1 %
BILIRUB SERPL-MCNC: 0.7 MG/DL
BUN SERPL-MCNC: 18.3 MG/DL (ref 8–23)
CALCIUM SERPL-MCNC: 8.2 MG/DL (ref 8.8–10.4)
CHLORIDE SERPL-SCNC: 107 MMOL/L (ref 98–107)
CREAT SERPL-MCNC: 1.35 MG/DL (ref 0.67–1.17)
EGFRCR SERPLBLD CKD-EPI 2021: 55 ML/MIN/1.73M2
EOSINOPHIL # BLD AUTO: 0.3 10E3/UL (ref 0–0.7)
EOSINOPHIL NFR BLD AUTO: 1 %
ERYTHROCYTE [DISTWIDTH] IN BLOOD BY AUTOMATED COUNT: 14 % (ref 10–15)
GLUCOSE SERPL-MCNC: 86 MG/DL (ref 70–99)
GRAM STAIN RESULT: NORMAL
GRAM STAIN RESULT: NORMAL
HCO3 SERPL-SCNC: 21 MMOL/L (ref 22–29)
HCT VFR BLD AUTO: 31.9 % (ref 40–53)
HGB BLD-MCNC: 10.2 G/DL (ref 13.3–17.7)
IMM GRANULOCYTES # BLD: 0.4 10E3/UL
IMM GRANULOCYTES NFR BLD: 1 %
LYMPHOCYTES # BLD AUTO: 2 10E3/UL (ref 0.8–5.3)
LYMPHOCYTES NFR BLD AUTO: 8 %
MCH RBC QN AUTO: 39.2 PG (ref 26.5–33)
MCHC RBC AUTO-ENTMCNC: 32 G/DL (ref 31.5–36.5)
MCV RBC AUTO: 123 FL (ref 78–100)
MONOCYTES # BLD AUTO: 0.7 10E3/UL (ref 0–1.3)
MONOCYTES NFR BLD AUTO: 3 %
NEUTROPHILS # BLD AUTO: 21.8 10E3/UL (ref 1.6–8.3)
NEUTROPHILS NFR BLD AUTO: 86 %
NEUTS HYPERSEG BLD QL SMEAR: PRESENT
NRBC # BLD AUTO: 0 10E3/UL
NRBC BLD AUTO-RTO: 0 /100
PLAT MORPH BLD: ABNORMAL
PLATELET # BLD AUTO: 343 10E3/UL (ref 150–450)
POTASSIUM SERPL-SCNC: 4.1 MMOL/L (ref 3.4–5.3)
PROT SERPL-MCNC: 6.9 G/DL (ref 6.4–8.3)
RBC # BLD AUTO: 2.6 10E6/UL (ref 4.4–5.9)
RBC MORPH BLD: ABNORMAL
SODIUM SERPL-SCNC: 137 MMOL/L (ref 135–145)
WBC # BLD AUTO: 25.3 10E3/UL (ref 4–11)

## 2024-11-04 PROCEDURE — 99233 SBSQ HOSP IP/OBS HIGH 50: CPT | Mod: GC | Performed by: INTERNAL MEDICINE

## 2024-11-04 PROCEDURE — 71250 CT THORAX DX C-: CPT

## 2024-11-04 PROCEDURE — 80053 COMPREHEN METABOLIC PANEL: CPT

## 2024-11-04 PROCEDURE — 99232 SBSQ HOSP IP/OBS MODERATE 35: CPT | Performed by: STUDENT IN AN ORGANIZED HEALTH CARE EDUCATION/TRAINING PROGRAM

## 2024-11-04 PROCEDURE — 99223 1ST HOSP IP/OBS HIGH 75: CPT | Performed by: STUDENT IN AN ORGANIZED HEALTH CARE EDUCATION/TRAINING PROGRAM

## 2024-11-04 PROCEDURE — 250N000009 HC RX 250

## 2024-11-04 PROCEDURE — 250N000013 HC RX MED GY IP 250 OP 250 PS 637

## 2024-11-04 PROCEDURE — 258N000003 HC RX IP 258 OP 636

## 2024-11-04 PROCEDURE — 99232 SBSQ HOSP IP/OBS MODERATE 35: CPT | Mod: FS | Performed by: STUDENT IN AN ORGANIZED HEALTH CARE EDUCATION/TRAINING PROGRAM

## 2024-11-04 PROCEDURE — 250N000011 HC RX IP 250 OP 636

## 2024-11-04 PROCEDURE — 250N000013 HC RX MED GY IP 250 OP 250 PS 637: Performed by: STUDENT IN AN ORGANIZED HEALTH CARE EDUCATION/TRAINING PROGRAM

## 2024-11-04 PROCEDURE — 120N000002 HC R&B MED SURG/OB UMMC

## 2024-11-04 PROCEDURE — 85004 AUTOMATED DIFF WBC COUNT: CPT

## 2024-11-04 PROCEDURE — 250N000011 HC RX IP 250 OP 636: Mod: JZ

## 2024-11-04 PROCEDURE — 36415 COLL VENOUS BLD VENIPUNCTURE: CPT

## 2024-11-04 PROCEDURE — 99418 PROLNG IP/OBS E/M EA 15 MIN: CPT | Performed by: STUDENT IN AN ORGANIZED HEALTH CARE EDUCATION/TRAINING PROGRAM

## 2024-11-04 PROCEDURE — 71250 CT THORAX DX C-: CPT | Mod: 26 | Performed by: RADIOLOGY

## 2024-11-04 RX ADMIN — APIXABAN 2.5 MG: 2.5 TABLET, FILM COATED ORAL at 08:09

## 2024-11-04 RX ADMIN — HYDROXYUREA 500 MG: 500 CAPSULE ORAL at 21:40

## 2024-11-04 RX ADMIN — HYDROXYUREA 500 MG: 500 CAPSULE ORAL at 08:09

## 2024-11-04 RX ADMIN — PIPERACILLIN AND TAZOBACTAM 3.38 G: 3; .375 INJECTION, POWDER, LYOPHILIZED, FOR SOLUTION INTRAVENOUS at 16:11

## 2024-11-04 RX ADMIN — Medication 1 TABLET: at 08:09

## 2024-11-04 RX ADMIN — PIPERACILLIN AND TAZOBACTAM 3.38 G: 3; .375 INJECTION, POWDER, LYOPHILIZED, FOR SOLUTION INTRAVENOUS at 09:45

## 2024-11-04 RX ADMIN — DORNASE ALFA 50 ML: 1 SOLUTION RESPIRATORY (INHALATION) at 08:09

## 2024-11-04 RX ADMIN — APIXABAN 2.5 MG: 2.5 TABLET, FILM COATED ORAL at 21:40

## 2024-11-04 RX ADMIN — PIPERACILLIN AND TAZOBACTAM 3.38 G: 3; .375 INJECTION, POWDER, LYOPHILIZED, FOR SOLUTION INTRAVENOUS at 03:49

## 2024-11-04 RX ADMIN — PIPERACILLIN AND TAZOBACTAM 3.38 G: 3; .375 INJECTION, POWDER, LYOPHILIZED, FOR SOLUTION INTRAVENOUS at 21:40

## 2024-11-04 NOTE — PROGRESS NOTES
"Blood pressure 129/86, pulse 77, temperature 98.5  F (36.9  C), temperature source Oral, resp. rate 16, height 1.778 m (5' 10\"), weight 65.1 kg (143 lb 9.6 oz), SpO2 98%.    Activity: SBA in room  Neuros:  AOX4, makes needs known  Cardiac: WDL  Respiratory: RA Rt side CT -20cm   GI/: AUOP, no BM  Diet: Regular, tolerating  Skin/Incisions/Drains: CT, PIV, body bruises  Lines: Lt PIV w/abx  Pain: Denies pain  Plan: Could not infuse tPA into chest tube, pulmonary ordered CXR. Patient did log rolls and ambulated to the bathroom and writer was able to inject tPA into chest tube.    "

## 2024-11-04 NOTE — PROVIDER NOTIFICATION
7B Jose Luis [facility] room 35 Patient's chest tube is clogged or not in placed, tried to inject tPA but would leak out at the dressing. Texted pulmonary and they ordered cxr done but not read. Please advise Juan Daniel

## 2024-11-04 NOTE — TELEPHONE ENCOUNTER
"[id number]  Jose Luis Parker  74 year old male  [facility] Diagnosis: JAK2+ and VTE   CBCD Provider: Cate    Incoming voicemail from Nabila. Jose Luis is in the hospital currently. Per voicemail, Jose Luis is getting \"clot-buster.\"    RN reviewed chart. He is currently at [facility] and was diagnosed with complex pleural effusion. He has been getting tPA in his chest tube.     RN called Nabila back. She would like Dr. Esposito to review medical notes and let her know if he has any specific recommendations.    Dr. Esposito will review the chart. RN will call her back in the afternoon to let her know.    Virgie Funes RN, BSN, PCCN  Nurse Clinician    [facility]  [address] [place] [address], [address], [place] [address]   Office, direct: [phone]  Main office number: [phone]  Pronouns: She, her, hers    Addendum 11/4/2024 12:48 PM:  RN spoke with Dr. Esposito. He reviewed Jose Luis's chart. RN updated Nabila.    EMANI VALDEZ  "

## 2024-11-04 NOTE — PLAN OF CARE
Goal Outcome Evaluation:      Plan of Care Reviewed With: patient    Overall Patient Progress: no changeOverall Patient Progress: no change     Temp: 98.2  F (36.8  C) Temp src: Oral BP: 120/61 Pulse: 77   Resp: 18 SpO2: 100 % O2 Device: None (Room air)      Neuro: Alert and Oriented  x4, let needs known  Cardiac:WNL, denies cardiac chest pain, lowers non edematous  Respiratory: LS clear and dim on room air; R CT to -20 H2O; cannister at 640 ml with 60 ml out overnight; output serous and creamy; no alteplase administered overnight  GI/: WNL, Voiding AUOP; last BM 11-2; BS audible  Diet/Appetite: regular, denies nausea  Skin: R CT appears to have some localized swelling at the site and tenderness; no crepitous   LDA: R PIV SL  Activity: UAL  Pain: denies need for PRN  Plan: continue plan of care

## 2024-11-04 NOTE — CONSULTS
"  [facility]     Patient:  Jose Luis Parker   Date of birth 1950, Medical record number [id number]  Date of Visit:  11/04/2024  Date of Admission: 10/28/2024  Consult Requester:John Torres MD          Assessment and Recommendations:   ID Problem List  Right-sided empyema, secondary to #2  RLL community acquired pneumonia  RUL pulmonary mass - possible malignancy  Chronic myeloproliferative disorder (+JAK2) - with chronic leukocytosis    RECOMMENDATION:  Continue IV Zosyn for now  If discharging, recommend PO Augmentin 875-125 mg BID to treatment of empyema.    Follow up pleural fluid cultures (NGTD). I added 16S to pleural fluid culture.  Will follow up 11/4 CT chest. Consider thoracic surgery consult if no improvement for further source control.   Also needs workup of RUL pulmonary mass - defer to primary team/pulmonology  Will request ID clinic follow up    ASSESSMENT:  Jose Luis Parker is a 74 year old male with PMHx significant for chronic myeloproliferative disorder, LLE DVT, bilateral PE (2017) who was admitted 10/28/24 with loculated right-sided empyema likely secondary to recent community acquired pneumonia.     Had completed 10 day course of Augmentin just prior to admission, noted improvement in fever and cough, though still right sided chest pain. Outpatient CT imaging with \"low dense irregular tubular opacity in the right upper lobe that is of concern for a primary lung malignancy; a large subpulmonic, complex bilobed effusion in the right lung base with adjacent inflammatory stranding of the right paramediastinal fat, trace pericardial effusion dependently suspicious for an empyema; as well as an adjacent right middle and lower lobes and trace loculated effusion posterolaterally in the right chest\". Was started on IV vancomycin x1 and Zosyn on admission, before chest tube placed 10/30, likely lowering culture yield. Pleural fluid with gross pus, >105K " "TNC, LDH >2500. Cultures NGTD. Minimal output since chest tube placement - total of 625 ml despite lytics x6. Repeat CT chest pending today. If persistent fluid, would recommend thoracic surgery consult to consider surgical intervention. Requested 16S PCR be added to pleural fluid to help guide antimicrobials    Anticipate he will need at least 4-6 weeks antibiotic for empyema. Recommend transition to PO Augmentin on discharge since he noted symptomatic improvement with this regimen prior to admission. Failure to completely resolve his symptoms is more related to lack of source control for empyema than antibiotic failure. He has no history of resistant organisms or Pseudomonas to warrant more broad coverage. Additionally, would avoid fluoroquinolone for him with plan for long course and history of myalgias/tendon pain.     Thank you for this consult. ID will continue to follow.   Patient was discussed with Dr. Lezama. Recommendations discussed with primary team.    Simin Earl PA-C  Infectious Diseases  Contact via Cylance or Explore.To Yellow Pages Paging/Directory    90 MINUTES SPENT BY ME on the date of service doing chart review, history, exam, documentation & further activities per the note.           History of Present Illness:     Jose Luis Parker is a 74 year old male with past medical history significant for chronic myeloproliferative disorder, LLE DVT, bilateral PE (2017) who was admitted 10/28/24 with loculated right-sided pleural effusion. ID consulted for \"transition to oral antibiotics\".     Reported fever and productive green cough as outpatient. CXR with opacity at right lung base and probable right lower lung infiltrate. S/p 10 day course of Augmentin beginning 10/18 during which he noted improvement in fever (remaining low grade temps 99s only) and decreased cough. However, still had fatigue and right sided chest pain. Denies night sweats. Has had poor appetite in the last month, lost ~10 lbs per wife. Mild " "orthopnea at home, now resolved. Had outpatient CT chest 10/28 with \" low dense irregular tubular opacity in the right upper lobe that is of concern for a primary lung malignancy, a large subpulmonic, complex bilobed effusion in the right lung base with adjacent inflammatory stranding of the right paramediastinal fat, trace pericardial effusion dependently suspicious for an empyema, as well as an adjacent right middle and lower lobes and trace loculated effusion posterolaterally in the right chest \" Came to ED for drainage and further management. Was started on empiric IV vancomycin and Zosyn in ED, narrowed to Zosyn alone on 10/29 in setting of negative MRSA nares. Ultimately had chest tube placed 10/30 - notable for renzo pus, WBC 105K and 100% neutrophils, LDH >2500. Cytology negative for malignancy. Protein 5.6, glucose <2. Pleural fluid culture remains NGTD. He received lytics on 11/2 with plan for 6 doses and repeat CT chest 11/4 - pending. Had 100 ml output yesterday, 375 ml day prior. Consider thoracic surgery consult if no complete drainage. Blood culture on admission NGTD. He denies any fevers since admission, appetite is improving and right sided chest pain is improving. He was never hypoxic and denies any dyspnea.     Exposures - no tobacco, vaping, smoking history. 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 He does garden a lot - used spray herbicide (ex. RoundUp) and digging in a lot of dirt. They have ~5 acres and large garden, manage rosebushes. He was an avid runner and very active. Wife at bedside. They winter near [place], [place]. No international travel. Had a small dog who they lost last week. No family history of lung disease or cancer.    Endorses poorly tolerating levofloxacin - body and muscle aches, unable to walk due to tendon pain. No other antibiotic intolerance or allergies reported.          Review of Systems:   CONSTITUTIONAL:  as per HPI  EYES: negative for " icterus, redness, or purulent drainage.   ENT:  negative for nasal congestion, rhinorrhea, or sore throat.  RESPIRATORY:  as per HPI  CARDIOVASCULAR:  negative for chest pain or palpitations.  GASTROINTESTINAL:  negative for nausea, vomiting, diarrhea and constipation.  GENITOURINARY:  negative for dysuria, frequency, or urgency.   HEME:  No easy bruising or bleeding.  INTEGUMENT:  negative for rash and pruritus.  NEURO:  Negative for headache, vision changes, or numbness/tingling in extremities.         Past Medical History:   No past medical history on file.         Past Surgical History:     Past Surgical History:   Procedure Laterality Date    IR CHEST TUBE PLACEMENT NON-TUNNELED RIGHT  10/30/2024            Family History:     No family history on file.         Social History:     Social History     Tobacco Use    Smoking status: Never    Smokeless tobacco: Never   Substance Use Topics    Alcohol use: Not on file     History   Sexual Activity    Sexual activity: Not on file            Current Medications:     Current Facility-Administered Medications   Medication Dose Route Frequency Provider Last Rate Last Admin    apixaban ANTICOAGULANT (ELIQUIS) tablet 2.5 mg  2.5 mg Oral BID Phil Johnson MD   2.5 mg at 11/04/24 0809    hydroxyurea (HYDREA) capsule 500 mg  500 mg Oral BID Urbano Patricia MD   500 mg at 11/04/24 0809    multivitamin w/minerals (THERA-VIT-M) tablet 1 tablet  1 tablet Oral Daily FlatmoeMorena PA-C   1 tablet at 11/04/24 0809    piperacillin-tazobactam (ZOSYN) 3.375 g vial to attach to  mL bag  3.375 g Intravenous Q6H Flatmoe Morena, PA-C   3.375 g at 11/04/24 0945    sodium chloride (PF) 0.9% PF flush 3 mL  3 mL Intracatheter Q8H Marcus Iniguez, PA-C   3 mL at 11/04/24 0940    sodium chloride (PF) 0.9% PF flush 3 mL  3 mL Intracatheter Q8H [place]oe Morena, PA-C   3 mL at 11/04/24 0814            Allergies:     Allergies   Allergen Reactions    Levofloxacin Muscle  Pain (Myalgia)            Physical Exam:   Vitals were reviewed  Patient Vitals for the past 24 hrs:   BP Temp Temp src Pulse Resp SpO2   11/04/24 0816 123/70 98.1  F (36.7  C) Oral -- 18 100 %   11/04/24 0005 120/61 98.2  F (36.8  C) Oral -- 18 100 %   11/03/24 1634 129/86 98.5  F (36.9  C) Oral 77 16 98 %       Physical Examination:  Constitutional: Pleasant adult male seen sitting up in bed, in NAD. Alert and interactive.   HEENT: NCAT, anicteric sclerae, conjunctiva clear. Moist mucous membranes. Dentition intact/well cared for.  Respiratory: Non-labored breathing, good air exchange. Lungs are clear to auscultation apically, diminished at R > L base, without wheezing, crackles or rhonchi. No cough noted.   Cardiovascular: Regular rate and rhythm with no murmur, rub or gallop.  GI: Normoactive BS. Abdomen is soft, nontender to palpation, non-distended. No rigidity or guarding. No rebound tenderness.  Skin: Warm and dry. No rashes or lesions on exposed surfaces.  Musculoskeletal: Extremities grossly normal. No edema present. Spine without tenderness to palpation.  Neurologic: A &O x3, speech normal, answering questions appropriately. Moves all extremities spontaneously. Grossly non-focal.  Neuropsychiatric: Mentation and affect normal/appropriate.  VAD: PIV is c/d/i with no erythema, drainage, or tenderness. Chest tube with serosanguinous output         Laboratory Data:     Inflammatory Markers  No lab results found.    Hematology Studies    Recent Labs   Lab Test 11/04/24  0658 11/03/24  1628 11/01/24  1025 10/31/24  0910 10/30/24  1115 10/29/24  0519 11/06/22  1553 04/11/22  1358 08/06/21  1117 08/06/21  1117 10/19/20  1305 10/21/19  1235 05/21/19  1239 01/21/19  0000 11/13/18  1228 09/02/18  1409   WBC 25.3* 27.1* 28.9* 31.1* 32.4* 30.9*   < > 25.0*   < > 17.7* 18.2*   < > 11.8*   < > 17.5* 13.0*   ANEU  --   --   --   --   --   --   --  23.0*  --  14.9* 15.8*  --  9.9*  --  14.6* 10.4*   AEOS  --   --   --    "--   --   --   --  0.3  --  0.4 0.1  --  0.1  --  0.1 0.2   HGB 10.2* 11.2* 11.0* 11.0* 10.6* 10.0*   < > 13.7   < > 14.8 13.4   < > 14.6   < > 14.7 16.8   * 123* 120* 121* 120* 121*   < > 119*   < > 114* 122*   < > 112*   < > 114* 107*    406 418 414 425 409   < > 405   < > 293 353   < > 276   < > 250 233    < > = values in this interval not displayed.       Metabolic Studies     Recent Labs   Lab Test 11/04/24  0658 11/03/24  1628 11/01/24  1025 10/31/24  0910 10/30/24  1115    138 137 137 137   POTASSIUM 4.1 3.9 4.2 4.4 4.2   CHLORIDE 107 105 105 106 106   CO2 21* 22 22 22 21*   BUN 18.3 21.2 18.6 19.3 19.4   CR 1.35* 1.33* 1.23* 1.30* 1.25*   GFRESTIMATED 55* 56* 61 57* 60*       Hepatic Studies    Recent Labs   Lab Test 11/04/24  0658 11/03/24  1628 10/31/24  0910 10/30/24  1115 10/29/24  0519 07/29/24  1321 04/28/24  1450 10/07/23  1039   BILITOTAL 0.7 0.7  --   --  0.5 0.9 0.6 0.5   ALKPHOS 121 140  --   --  168* 89 108 81   ALBUMIN 2.7* 3.0* 2.7* 2.6* 2.6* 4.0 3.7 3.6   AST 12 14  --   --  11 18 25 19   ALT <5 7  --   --  15 11 7 <5       Microbiology:  Culture   Date Value Ref Range Status   10/30/2024 No Growth  Final   10/29/2024   Final    >10 Squamous epithelial cells/low power field indicates oral contamination. Please recollect.   10/28/2024 No Growth  Final       Urine Studies  No lab results found.    Vancomycin Levels  No lab results found.    Invalid input(s): \"VANCO\"    Hepatitis B Testing No lab results found.  Hepatitis C Testing   No results found for: \"HCVAB\", \"HQTG\", \"HCGENO\", \"HCPCR\", \"HQTRNA\", \"HEPRNA\"  Respiratory Virus Testing    No results found for: \"RS\", \"FLUAG\"    IMAGING  XR Chest Port 1 View    Result Date: 11/3/2024  EXAM: XR CHEST PORT 1 VIEW 11/3/2024 8:00 PM INDICATION: chest tube not flushing; confirm location placement COMPARISON: CT 11/1/2024, chest radiograph 10/31/2024 TECHNIQUE: Single portable semiupright AP view of the chest. FINDINGS: Stable " loculated right pleural effusion with possible repositioning/uncoiling of the pigtail catheter versus difference in projection. Possible kink at the skin entrance of the catheter. Similar right greater than left nodular patchy opacities. No evidence of focal consolidation or pneumothorax. Trachea is midline. Cardiac silhouette is stable. Soft tissues are unremarkable.     IMPRESSION: 1. Stable loculated right pleural effusion with unchanged right greater than left patchy airspace opacities. 2. Grossly stable position of right basilar chest tube with possible kinking along the lateral chest wall. Uncoiled appearance of the central pigtail component, likely differences in projection. I have personally reviewed the examination and initial interpretation and I agree with the findings. SAKSHI LAU MD   SYSTEM ID:  [id number]    CT Chest w/o Contrast    Result Date: 11/1/2024  EXAMINATION: Chest CT  11/1/2024 11:05 AM CLINICAL HISTORY: follow up empyema COMPARISON: CT PE, 10/28/2024 TECHNIQUE: CT imaging obtained through the chest without contrast. Axial, coronal, and sagittal reconstructions and axial MIP reformatted images are reviewed. FINDINGS: Lungs: Layering debris in the thoracic trachea. The central tracheobronchial tree is otherwise patent. Decreased volume of right subpulmonic loculated empyema with right basilar pigtail catheter in unchanged position, correlated with chest radiograph dated 10/31/2024. Similar appearance of right basilar pleural effusion. No left pleural effusion. No pneumothorax. Unchanged soft tissue density mass within the right upper lobe, which measures up to 4 cm in transverse orientation (series 3 images 96 through 121). Mediastinum: The thyroid is unremarkable. Cardiac size is normal. Normal caliber aorta and main pulmonary artery. No pericardial effusion. Mild-to-moderate coronary artery calcifications. No thoracic lymphadenopathy. Esophagus is normal in caliber. Bones and soft  tissues: Stable T8 compression deformity. No significant neuroforaminal or spinal canal stenosis is suspected. No suspicious osseous lesion is identified. Unremarkable appearance of the superficial soft tissues. Upper Abdomen: Simple hepatic cyst in the peripheral right hepatic lobe. Hepatomegaly with hypoattenuation of the hepatic parenchyma the visualized abdomen is otherwise unremarkable.     IMPRESSION: 1. Decreasing volume of loculated right subpulmonic loculated empyema, with stable position of right basilar chest tube. Stable appearance of right basilar pleural effusion. 2. Unchanged appearance of right upper lobe soft tissue mass measuring up to 4 cm in transverse orientation. Concerning for malignancy. 3. Layering debris in the thoracic trachea. Consider clinical evaluation for aspiration risk. I have personally reviewed the examination and initial interpretation and I agree with the findings. MAREN WATTERS MD   SYSTEM ID:  [id number]    XR Chest Port 1 View    Result Date: 10/31/2024  Exam: XR CHEST PORT 1 VIEW, 10/31/2024 10:56 AM Comparison: Correlation made with CT chest, 10/28/2024; chest radiograph, 10/27/2024 History: eval effusion/empyema s/p chest tube Findings: Frontal upright radiograph of the chest was obtained. Slightly decreased volume of right side pleural effusion compared with radiograph dated 10/27/2024 status post right basilar pigtail catheter placement. Left costophrenic angle is clear. No pneumothorax. Midline trachea. Stable cardiac silhouette. Right greater than left patchy interstitial opacities, similar to prior. Known nodular opacity in the right upper lobe is better seen on comparison CT. No definite airspace consolidation. No pneumothorax.     Impression: 1. Decreased volume of right basilar loculated pleural effusion post pigtail catheter placement compared with exam dated 10/27/2024. No appreciable pneumothorax. 2. Continued right greater than left interstitial and patchy  airspace opacities, favored to represent atelectatic change versus very mild pulmonary edema versus infection. I have personally reviewed the examination and initial interpretation and I agree with the findings. DEEPA VALDES DO ITZEL   SYSTEM ID:  [id number]    IR Chest Tube Place Non Tunneled Right    Result Date: 10/30/2024  Procedure date: 10/30/2024 History: The patient has a history of a complex right pleural effusion. Chest tube placement was requested. Preprocedure diagnosis: Right pleural effusion. Post procedure diagnosis: Same. Informed consent was obtained and the site confirmed. : Anirudh Iniguez PA-C. Assist: SHARONA Gomez. Medications: 1% lidocaine, 4 cc subcutaneously. Sedation face to face time: None. Nursing: The patient was continuously monitored by IR nursing staff under my supervision, and remained stable throughout the procedure. Consent: The procedure, as well as risks and benefits was discussed with the patient. Informed written and verbal consent was obtained prior to the procedure. Findings: Ultrasound was used to evaluate the right chest. An effusion was seen with the patient in the upright position. The chest was then prepped and draped in usual sterile fashion. Under ultrasound guidance, the tissues overlying the effusion were locally anesthetized with subcutaneous administration of 1% lidocaine, and a 3 mm skin incision was made with a # 11 blade. Under ultrasound guidance, a 5 Fr., 10 cm Yueh needle/catheter combo was advanced into the right pleural space, with return of opaque light green fluid. Upon return of fluid, the Yueh catheter was advanced off the needle. Under ultrasound guidance, a 0.35 Bentson wire was advanced through the catheter into the pleural space. The UA catheter was exchanged over wire for a 12 [place] Mc dilator and the subcutaneous tissues were dilated over wire. Under ultrasound guidance, the 12 [place] Mc dilator was exchanged over wire for a 14 Fr.  non-locking pigtail chest tube, which was advanced into the pleural space. Subsequent ultrasound evaluation confirmed proper placement within the pleural space. The catheter was secured to the skin with a single 2-0 Ethilon suture,  and connected to water-seal drainage apparatus. 90 cc aspirated and sent for labs as ordered. A three-way stopcock was placed in the drain circuit for lytic delivery if indicated. The site was cleansed and dressed with a sterile bandage. Images were saved throughout the procedure. The procedure was well-tolerated, with no immediate complications. Contrast: None. Fluoroscopy time: None. Specimens: 90 cc opaque light green fluid from the right pleural space. Estimated blood loss: 0.5 cc.     Impression: Ultrasound guided placement of right-sided 14 Fr. non-locking pigtail chest tube with stopcock in drainage circuit. Return of opaque light green fluid. Plan: Patient transported to the inpatient care unit in stable condition. Dressing changes per floor routine. Follow up per primary team. Attestation: The physician assistant (PA) who performed this procedure and signed the above report is licensed to practice in the [facility] pursuant to [place] Statute 147A.09.  This includes meeting the Statute and [place] Board of Medical Practice requirement of an active Delegation Agreement, which documents delegation of services by primary and alternate supervising physicians. All services rendered are performed under a collaborative agreement with Dr. Valentino Meier, Director of Interventional Radiology, [facility]. DEJON QUIROGA PA-C   SYSTEM ID:  [id number]    Echo Complete    Result Date: 10/29/2024  [id number]^BRADY^FRANCISCO J^TOMÁS  [facility] [facility] [address] [place] [id number]  Name: JOHNATHAN TORRES MRN: [id number] : 1950 Study Date: 10/29/2024 02:05 PM Age: 74  yrs Gender: Male Patient Location: [facility] Reason For Study: Pericardial Effusion Ordering Physician: FRANCISCO J ABREU Performed By: Amanda Skinner  BSA: 1.8 m2 Height: 70 in Weight: 143 lb HR: 92 BP: 134/85 mmHg ______________________________________________________________________________ Procedure Complete Portable Echo Adult. ______________________________________________________________________________ Interpretation Summary Left ventricular size, wall motion and function are normal. The ejection fraction is 60-65%. Right ventricular function, chamber size, wall motion, and thickness are normal. No significant valvular abnormalities present. No pericardial effusion is present.  Previous study not available for comparison. ______________________________________________________________________________ Left Ventricle Left ventricular size, wall motion and function are normal. The ejection fraction is 60-65%. Left ventricular wall thickness is normal. Left ventricular diastolic function is normal.  Right Ventricle Right ventricular function, chamber size, wall motion, and thickness are normal.  Atria Both atria appear normal.  Mitral Valve The mitral valve is normal. Trace to mild mitral insufficiency is present.  Aortic Valve Aortic valve is normal in structure and function. The aortic valve is tricuspid.  Tricuspid Valve The tricuspid valve is normal. Trace tricuspid insufficiency is present. The right ventricular systolic pressure is approximated at 23.1 mmHg plus the right atrial pressure.  Pulmonic Valve The pulmonic valve is normal.  Vessels The aorta root is normal. The inferior vena cava was normal in size with preserved respiratory variability. The thoracic aorta is normal.  Pericardium No pericardial effusion is present.  Miscellaneous No significant valvular abnormalities present.  Compared to Previous Study Previous study not available for comparison.  ______________________________________________________________________________ MMode/2D Measurements & Calculations IVSd: 1.0 cm LVIDd: 5.3 cm LVIDs: 3.0 cm LVPWd: 1.0 cm FS: 43.0 % LV mass(C)d: 207.6 grams LV mass(C)dI: 114.7 grams/m2 Ao root diam: 3.7 cm asc Aorta Diam: 3.6 cm LVOT diam: 2.4 cm LVOT area: 4.4 cm2 Ao root diam index Ht(cm/m): 2.1 Ao root diam index BSA (cm/m2): 2.1 Asc Ao diam index BSA (cm/m2): 2.0 Asc Ao diam index Ht(cm/m): 2.0 RWT: 0.39 TAPSE: 2.8 cm  Doppler Measurements & Calculations MV E max bryant: 53.9 cm/sec MV A max bryant: 59.7 cm/sec MV E/A: 0.90 MV dec time: 0.24 sec TR max bryant: 240.3 cm/sec TR max P.1 mmHg E/E' av.4 Lateral E/e': 5.2 Medial E/e': 5.7 RV S Bryant: 21.1 cm/sec  ______________________________________________________________________________ Report approved by: Dena CHAO 10/29/2024 03:49 PM       CT Chest Pulmonary Embolism w Contrast    Result Date: 10/28/2024  EXAM: CT CHEST PULMONARY EMBOLISM W CONTRAST LOCATION: [facility] DATE: 10/28/2024 INDICATION:  persistent fevers and cough. Treated for pneumonia recently.  COMPARISON: Chest x-ray 10/27/2024 and 10/18/2024 TECHNIQUE: CT chest pulmonary angiogram during arterial phase injection of IV contrast. Multiplanar reformats and MIP reconstructions were performed. Dose reduction techniques were used. CONTRAST: 58ML ISOVIEW 370 FINDINGS: ANGIOGRAM CHEST: Excellent opacification of pulmonary arteries and branches. No evidence of pulmonary emboli. Aorta and branches are normal caliber. LUNGS AND PLEURA: There is an irregular shaped, hypodense slightly tubular opacity in the right upper lobe (axial images 102-116, coronal images 40-43). This extends between the bronchovascular bundles and does not reflect inspissated mucus. This measures at least at 3 cm in length and the dominant central component measures 1.6 x 1.3 cm. There is a complex, bilobed, loculated subpulmonic effusion in the right  base. This extends throughout the surface of the diaphragm and 10 cm in craniocaudal dimension. No air within this cavity. There is  adjacent atelectasis of the right middle and lower lobes with air bronchograms along the periphery. There is a miniscule loculated right pleural effusion posterolaterally in the right lung base as well. Paraseptal emphysema in the upper lobes. Retained secretions in the right mainstem bronchus. Other areas of discoid atelectasis in the lingula and right upper lobe. MEDIASTINUM/AXILLAE: Thyroid is normal. Less than 1 cm subcarinal and right hilar nodes. Stranding extends to involve the right anterior paramediastinal fat with trace at inferior pericardial effusion. CORONARY ARTERY CALCIFICATION: Mild. UPPER ABDOMEN: There is a 1 cm subcapsular hypodense lesion in the right lobe of the liver with a density of 10 Hounsfield units suggestive of a cyst. Calcified granuloma in the spleen. MUSCULOSKELETAL: No rib fractures or suspicious bone lesions. There is a  compression fracture of the superior endplate of T8 with an adjacent Schmorl's node.     IMPRESSION: 1.  There are 2 abnormalities in the right chest, presumed not related to each other. 2.  There is a suspicious low dense irregular tubular opacity in the right upper lobe that is of concern for a primary lung malignancy as noted above. 3.  There is a large subpulmonic, complex bilobed effusion in the right lung base with adjacent inflammatory stranding of the right paramediastinal fat, trace pericardial effusion dependently. Findings suspicious for an empyema. 4.  Compressive atelectasis of the adjacent right middle and lower lobes and trace loculated effusion posterolaterally in the right chest also noted. 5.  Retained secretions in the right calf mainstem bronchus with presumed reactive right hilar and subcarinal nodes. 6.  Minimal paraseptal emphysema in the upper lobes. 7.  Findings discussed by the undersigned with Dr. Shay  at 1149. 8.  Patient needs further evaluation by pulmonary and needs the right chest collection drained.      ECHO

## 2024-11-04 NOTE — PLAN OF CARE
Goal Outcome Evaluation:  Vitals:    24 0840 24 1634 24 0005 24 0816   BP: 122/62 129/86 120/61 123/70   BP Location: Right arm Right arm Right arm Right arm   Patient Position:    Supine   Cuff Size: Adult Small   Adult Small   Pulse:  77     Resp: 16 16 18 18   Temp: 98  F (36.7  C) 98.5  F (36.9  C) 98.2  F (36.8  C) 98.1  F (36.7  C)   TempSrc: Oral Oral Oral Oral   SpO2: 99% 98% 100% 100%   Weight:       Height:           Neuro: alert and oriented     VS: afebrile vitally stable, sating 100% on room air, non labor breathing, lungs clear,     B    Cardiac: 69    Pain/Nausea:denies any pain, denies nausea     Lines/Drains: right chest tube to -20 cm H2O suction, with serous out put     GI/: voiding adequate +BS passing gas     Diet/Appetite:tolerating regular diet,     Activity:up independently ambulated     Plan: had CT scan, possible chest tube removal tomorrow, continue with plan of care.

## 2024-11-04 NOTE — PROVIDER NOTIFICATION
7B Jose Luis Parker room 35 Patient's chest tube is clogged. Cannot inject schedule tPA  into line. Please advise Juan Daniel

## 2024-11-04 NOTE — PROGRESS NOTES
"[facility] Pulmonology Follow up    Jose Luis Parker  MRN: [id number]  : 1950    Date of Admission: 10/28/2024  Date of Service: 2024    Assessment:  # RLL Community acquired pneumonia  # R Empyema  Symptoms of PNA started 10/15, started on Augmentin 10/18 and completed a 10d course but symptoms did not improve. Outpatient CT imaging 10/28 revealed a complex pleural effusion so pt was sent to the hospital. He was started on Vanc/Zosyn, narrowed to Zosyn. Chest tube draining renzo pus, WBC count >100,000, LDH >2500. Cultures negative to date. Is now s/p 6 doses of intrapleural lytics, final dose 24. Repeat CT chest on  with dramatic improvement in size of empyema. Microbiology studies remain negative (though had received antibiotics previously).    # RUL 3 cm pulmonary mass:   Will need work up, follow up with interventional pulmonary in the outpatient setting.    Recommendations:  CT chest today shows improvement in effusion. If chest tube output remains low, plan to remove chest tube tomorrow morning. Patient okay for discharge with antibiotics thereafter.  Pulmonary and Interventional Pulmonology referrals on discharge  Feel that thoracic surgery intervention is not needed at this point.  Follow-up CT chest in 6-8 weeks for interval change.  Agree with antibiotic plan as described in ID note 2024  Chest tube to continuous suction -20 mmHg overnight  Okay for chest tube to be to water seal intermittently so pt can take walks, go to the bathroom, etc.  Follow micro data       Brian Greenfield MD  Pulmonary Fellow    Subjective:    Feeling effectively back to normal at this point. His right chest pain is resolved. He denies cough or shortness of breath.    Review of systems: focused ROS as above     Objective:    /75 (BP Location: Right arm)   Pulse 75   Temp 98.5  F (36.9  C) (Oral)   Resp 18   Ht 1.778 m (5' 10\")   Wt 65.1 kg (143 lb 9.6 oz)   SpO2 98%  "  BMI 20.60 kg/m      Gen: in no acute distress, sitting upright in the chair  HEENT: MMM  Pulm: Slightly decreased lung sounds on right compared to left. Chest tube dressing C/D/I  GI: not distended  MSK: no gross deformities, no joint swelling  Integ: no rashes or lesions appreciated  Neuro: speech clear, alert and oriented  Psych: calm, appropriate    Data:  I have personally reviewed micro data to date.

## 2024-11-04 NOTE — PROGRESS NOTES
[facility]    Medicine Progress Note - Hospitalist Service, GOLD TEAM 4    Date of Admission:  10/28/2024    Assessment & Plan    Jose Luis Parker is a 74 year old male with PMH of chronic myeloproliferative disorder, LLE DVT, and bilateral PE (2017) was admitted 10/28/24 with loculated right-sided pleural effusion.  He was admitted for further care management as follows:    Today:  - Last round of intrapleural alteplase/dornase this morning  - CT chest today to evaluate response to lytic therapy  - Per discussion with pulmonology: Possible CT removal vs need for thoracic surgery consult pending results of CT scan  - Per discussion with ID, will continue IV abx for now. Future recs pending CT results and hospital course     # Right lower lobe pneumonia  # Multilobe loculated right side pleural effusion, empyema  Pt had 2 weeks of fever and green mucoid cough. Seen in , prescribed 10 days of Augmentin with marginal improvement. Returned to  10/27 where CXR showed persistent indeterminant opacity at the medial right lung base. Instructed to follow up 10/28 for chest CT which showed multiple pathologic findin) a tubular opacity in the RUL c/f primary lung malignancy 2) complex, bi-lobed effusion in the RLL, with inflammatory stranding and trace pericardial effusion and 3) trace loculated effusion posterolaterally in the RML and RLL.  Patient thus referred to [facility] ER.  On arrival, WBC up to 30K, CRP 68.5.  Started on broad-spectrum antibiotics with Vanco and Zosyn.  Pulmonology and infectious disease were consulted.  Patient was started on lytic therapy with intrapleural alteplase/dornase BID with 1hr instillation time x6 doses, completed .   - Pulmonology consulted, input appreciated              - Chest tube in place pending CT scan as above  - Thoracic surgery consult if lytics fail to adequately drain.  - Await culture results - NGTD  - Infectious  disease consulted for assistance with Abx planning   - Follow up blood and sputum cultures, NG thus far     # Concern for primary lung malignancy   Noted RUL 3 cm mass during course of work up. Cytology from pleural fluid was negative for malignancy thus far.  - IP following for consideration of biopsy - possibly outpatient      # Hx of myeloproliferative disorder: Continue PTA hydroxyurea 500 mg BID and apixiban 2.5 mg BID  # Hx of LLE and BL PE (2017): Held PTA apixaban for chest tube placement, now resumed  # Macrocytic anemia: Hypersegmented neutrophils present on 10/28 peripheral smear    Diet: Regular Diet Adult    DVT Prophylaxis: DOAC  De La Cruz Catheter: Not present  Lines: None     Cardiac Monitoring: None  Code Status: Full Code        Disposition Plan:  Medically Ready for Discharge: Anticipated Tomorrow     Expected Discharge Date: 11/07/2024      Destination: home with family            The patient's care was discussed with staff physician, Attending Physician, Dr. Torres, Bedside Nurse, Patient, and ID, pulm Consultant(s).    Miguel Angel Avila PA-C  Hospitalist Service, GOLD TEAM [address] [place] [address]  Securely message with [employer] (more info)  Text page via [facility] Paging/Directory     Clinically Significant Risk Factors               # Hypoalbuminemia: Lowest albumin = 2.6 g/dL at 10/30/2024 11:15 AM, will monitor as appropriate                           ______________________________________________________________________    Interval History   Patient resting comfortably in bed on my exam.  He denies any shortness of breath, reports he has not had major shortness of breath throughout his pneumonia course.  Denies fevers overnight.  Pain currently well-controlled at chest tube site.  He is hopeful to have chest tube removed today and is overall anxious to go home as soon as he is able.    Data reviewed today: I reviewed all medications, new labs and imaging results over the  last 24 hours. Please see discussion of these results in the A/P.    Physical Exam    Vital Signs: Temp: 98.1  F (36.7  C) Temp src: Oral BP: 123/70 Pulse: 77     Resp: 18 SpO2: 100 % O2 Device: None (Room air)    Weight: 143 lbs 9.6 oz    Constitutional: awake, alert, cooperative, in no acute distress. A&Ox3   Eyes: EOM, PERRLA. Sclera clear, conjunctiva normal. Anicteric   HENT: Normocephalic, without obvious abnormality, atraumatic.   Respiratory: No increased work of breathing. Right sided chest tube. Lung sounds diminished on the right base but moving air. Clear on the Left  Cards: RRR, no murmur appreciated   GI: Soft, non-tender without rebound or guarding.   Musculoskeletal:  Full range of motion noted.    Neurologic: Cranial nerves II-XII are grossly intact.   Neuropsychiatric: General: normal, calm and normal eye contact. Normal affect      Data   Recent Labs   Lab 11/04/24  0658 11/03/24  1628 11/01/24  1025 10/29/24  0519 10/28/24  1707   WBC 25.3* 27.1* 28.9*   < >  --    HGB 10.2* 11.2* 11.0*   < >  --    * 123* 120*   < >  --     406 418   < >  --    INR  --   --   --   --  1.47*    138 137   < >  --    POTASSIUM 4.1 3.9 4.2   < >  --    CHLORIDE 107 105 105   < >  --    CO2 21* 22 22   < >  --    BUN 18.3 21.2 18.6   < >  --    CR 1.35* 1.33* 1.23*   < >  --    ANIONGAP 9 11 10   < >  --    STEPAN 8.2* 8.4* 8.5*   < >  --    GLC 86 94 92   < >  --    ALBUMIN 2.7* 3.0*  --    < >  --    PROTTOTAL 6.9 7.7  --    < > 7.7   BILITOTAL 0.7 0.7  --    < >  --    ALKPHOS 121 140  --    < >  --    ALT <5 7  --    < >  --    AST 12 14  --    < >  --     < > = values in this interval not displayed.       Recent Results (from the past 24 hours)   XR Chest Port 1 View    Narrative    EXAM: XR CHEST PORT 1 VIEW 11/3/2024 8:00 PM    INDICATION: chest tube not flushing; confirm location placement    COMPARISON: CT 11/1/2024, chest radiograph 10/31/2024    TECHNIQUE: Single portable semiupright AP  view of the chest.     FINDINGS:   Stable loculated right pleural effusion with possible  repositioning/uncoiling of the pigtail catheter versus difference in  projection. Possible kink at the skin entrance of the catheter.  Similar right greater than left nodular patchy opacities. No evidence  of focal consolidation or pneumothorax. Trachea is midline. Cardiac  silhouette is stable. Soft tissues are unremarkable.      Impression    IMPRESSION:   1. Stable loculated right pleural effusion with unchanged right  greater than left patchy airspace opacities.  2. Grossly stable position of right basilar chest tube with possible  kinking along the lateral chest wall. Uncoiled appearance of the  central pigtail component, likely differences in projection.    I have personally reviewed the examination and initial interpretation  and I agree with the findings.    SAKSHI LAU MD         SYSTEM ID:  [id number]

## 2024-11-05 ENCOUNTER — APPOINTMENT (OUTPATIENT)
Dept: GENERAL RADIOLOGY | Facility: CLINIC | Age: 74
DRG: 177 | End: 2024-11-05
Attending: STUDENT IN AN ORGANIZED HEALTH CARE EDUCATION/TRAINING PROGRAM
Payer: COMMERCIAL

## 2024-11-05 VITALS
TEMPERATURE: 98 F | HEIGHT: 70 IN | OXYGEN SATURATION: 100 % | BODY MASS INDEX: 20.56 KG/M2 | HEART RATE: 68 BPM | RESPIRATION RATE: 16 BRPM | WEIGHT: 143.6 LBS | SYSTOLIC BLOOD PRESSURE: 138 MMHG | DIASTOLIC BLOOD PRESSURE: 82 MMHG

## 2024-11-05 LAB
ALBUMIN SERPL BCG-MCNC: 2.7 G/DL (ref 3.5–5.2)
ALP SERPL-CCNC: 116 U/L (ref 40–150)
ALT SERPL W P-5'-P-CCNC: <5 U/L (ref 0–70)
ANION GAP SERPL CALCULATED.3IONS-SCNC: 9 MMOL/L (ref 7–15)
AST SERPL W P-5'-P-CCNC: 12 U/L (ref 0–45)
BASOPHILS # BLD AUTO: 0.3 10E3/UL (ref 0–0.2)
BASOPHILS NFR BLD AUTO: 1 %
BILIRUB SERPL-MCNC: 0.5 MG/DL
BUN SERPL-MCNC: 19.1 MG/DL (ref 8–23)
CALCIUM SERPL-MCNC: 8.2 MG/DL (ref 8.8–10.4)
CHLORIDE SERPL-SCNC: 107 MMOL/L (ref 98–107)
CREAT SERPL-MCNC: 1.22 MG/DL (ref 0.67–1.17)
EGFRCR SERPLBLD CKD-EPI 2021: 62 ML/MIN/1.73M2
EOSINOPHIL # BLD AUTO: 0.3 10E3/UL (ref 0–0.7)
EOSINOPHIL NFR BLD AUTO: 1 %
ERYTHROCYTE [DISTWIDTH] IN BLOOD BY AUTOMATED COUNT: 14.2 % (ref 10–15)
GLUCOSE SERPL-MCNC: 85 MG/DL (ref 70–99)
HCO3 SERPL-SCNC: 22 MMOL/L (ref 22–29)
HCT VFR BLD AUTO: 33 % (ref 40–53)
HGB BLD-MCNC: 10.9 G/DL (ref 13.3–17.7)
IMM GRANULOCYTES # BLD: 0.4 10E3/UL
IMM GRANULOCYTES NFR BLD: 2 %
LYMPHOCYTES # BLD AUTO: 1.8 10E3/UL (ref 0.8–5.3)
LYMPHOCYTES NFR BLD AUTO: 8 %
MCH RBC QN AUTO: 39.6 PG (ref 26.5–33)
MCHC RBC AUTO-ENTMCNC: 33 G/DL (ref 31.5–36.5)
MCV RBC AUTO: 120 FL (ref 78–100)
MONOCYTES # BLD AUTO: 0.6 10E3/UL (ref 0–1.3)
MONOCYTES NFR BLD AUTO: 3 %
NEUTROPHILS # BLD AUTO: 18.6 10E3/UL (ref 1.6–8.3)
NEUTROPHILS NFR BLD AUTO: 85 %
NRBC # BLD AUTO: 0 10E3/UL
NRBC BLD AUTO-RTO: 0 /100
PLATELET # BLD AUTO: 330 10E3/UL (ref 150–450)
POTASSIUM SERPL-SCNC: 4 MMOL/L (ref 3.4–5.3)
PROT SERPL-MCNC: 6.7 G/DL (ref 6.4–8.3)
RBC # BLD AUTO: 2.75 10E6/UL (ref 4.4–5.9)
SODIUM SERPL-SCNC: 138 MMOL/L (ref 135–145)
WBC # BLD AUTO: 21.9 10E3/UL (ref 4–11)

## 2024-11-05 PROCEDURE — 80053 COMPREHEN METABOLIC PANEL: CPT

## 2024-11-05 PROCEDURE — 99232 SBSQ HOSP IP/OBS MODERATE 35: CPT | Performed by: STUDENT IN AN ORGANIZED HEALTH CARE EDUCATION/TRAINING PROGRAM

## 2024-11-05 PROCEDURE — 250N000013 HC RX MED GY IP 250 OP 250 PS 637

## 2024-11-05 PROCEDURE — 71045 X-RAY EXAM CHEST 1 VIEW: CPT | Mod: 26 | Performed by: RADIOLOGY

## 2024-11-05 PROCEDURE — 99239 HOSP IP/OBS DSCHRG MGMT >30: CPT | Performed by: STUDENT IN AN ORGANIZED HEALTH CARE EDUCATION/TRAINING PROGRAM

## 2024-11-05 PROCEDURE — 85025 COMPLETE CBC W/AUTO DIFF WBC: CPT

## 2024-11-05 PROCEDURE — 250N000013 HC RX MED GY IP 250 OP 250 PS 637: Performed by: STUDENT IN AN ORGANIZED HEALTH CARE EDUCATION/TRAINING PROGRAM

## 2024-11-05 PROCEDURE — 71045 X-RAY EXAM CHEST 1 VIEW: CPT | Mod: 77

## 2024-11-05 PROCEDURE — 99239 HOSP IP/OBS DSCHRG MGMT >30: CPT | Performed by: ORTHOPAEDIC SURGERY

## 2024-11-05 PROCEDURE — 71045 X-RAY EXAM CHEST 1 VIEW: CPT

## 2024-11-05 PROCEDURE — 36415 COLL VENOUS BLD VENIPUNCTURE: CPT

## 2024-11-05 PROCEDURE — 99233 SBSQ HOSP IP/OBS HIGH 50: CPT | Mod: GC | Performed by: INTERNAL MEDICINE

## 2024-11-05 PROCEDURE — 250N000011 HC RX IP 250 OP 636

## 2024-11-05 RX ADMIN — PIPERACILLIN AND TAZOBACTAM 3.38 G: 3; .375 INJECTION, POWDER, LYOPHILIZED, FOR SOLUTION INTRAVENOUS at 04:25

## 2024-11-05 RX ADMIN — Medication 1 TABLET: at 09:27

## 2024-11-05 RX ADMIN — HYDROXYUREA 500 MG: 500 CAPSULE ORAL at 09:27

## 2024-11-05 RX ADMIN — PIPERACILLIN AND TAZOBACTAM 3.38 G: 3; .375 INJECTION, POWDER, LYOPHILIZED, FOR SOLUTION INTRAVENOUS at 09:27

## 2024-11-05 RX ADMIN — APIXABAN 2.5 MG: 2.5 TABLET, FILM COATED ORAL at 09:27

## 2024-11-05 ASSESSMENT — ACTIVITIES OF DAILY LIVING (ADL)
ADLS_ACUITY_SCORE: 0

## 2024-11-05 NOTE — PLAN OF CARE
Pt. discharged at 1550 to home, was accompanied by spouse, and left with personal belongings. Pt. received complete discharge paperwork and discharge medications will be filled by [facility] pharmacy. Pt. was given times of last dose for all discharge medications in writing on discharge medication sheets. Discharge teaching included dressing changes, and signs and symptoms of infection. Dressing supplies sent home. Other discharge instructions completed by virtual discharge RN. Pt. had no further questions at the time of discharge and no unmet needs were identified.

## 2024-11-05 NOTE — PROGRESS NOTES
"  [facility] PROGRESS NOTE     Patient:  Jose Luis Parker   Date of birth 1950, Medical record number [id number]  Date of Visit:  11/05/2024  Date of Admission: 10/28/2024  Consult Requester:John Torres MD          Assessment and Recommendations:   ID Problem List  Right-sided empyema, secondary to #2  S/p chest tube 10/30 - 11/5, cultures NGTD (sent on abx)  RLL community acquired pneumonia  RUL pulmonary mass - possible malignancy  Chronic myeloproliferative disorder (+JAK2) - with chronic leukocytosis    RECOMMENDATION:  Continue IV Zosyn while admitted.   Change to PO Augmentin 875-125 mg BID for continued treatment of empyema.    Duration of therapy = minimum 4 weeks (10/30 - 11/27) with final duration TBD pending ID follow up and radiographic resolution  Follow up pleural fluid cultures (NGTD) and 16S (sent 11/4)  Requested ID clinic follow up  Recommend repeat CT chest wo contrast on ~11/22 (or prior to ID clinic visit).   Also needs workup of RUL pulmonary mass - defer to primary team/pulmonology. Plan is for outpatient IP follow up    ASSESSMENT:  Jose Luis Parker is a 74 year old male with PMHx significant for chronic myeloproliferative disorder, LLE DVT, bilateral PE (2017) who was admitted 10/28/24 with loculated right-sided empyema likely secondary to recent community acquired pneumonia.     Had completed 10 day course of Augmentin just prior to admission, noted improvement in fever and cough, though still right sided chest pain. Outpatient CT imaging with \"low dense irregular tubular opacity in the right upper lobe that is of concern for a primary lung malignancy; a large subpulmonic, complex bilobed effusion in the right lung base with adjacent inflammatory stranding of the right paramediastinal fat, trace pericardial effusion dependently suspicious for an empyema; as well as an adjacent right middle and lower lobes and trace loculated effusion posterolaterally " "in the right chest\". Was started on IV vancomycin x1 and Zosyn on admission, before chest tube placed 10/30, resulting in likely lowered culture yield. Pleural fluid with gross pus, >105K TNC, LDH >2500. Cultures NGTD. Minimal output since chest tube placement - total of 625 ml despite lytics x6 by 11/4. Repeat CT chest 11/4 with decreased loculated effusion, however still remains loculated and significant size per my read. Need close monitoring for resolution vs if further need for source control.     He will need at least 4-6 weeks antibiotic for empyema (start date from chest tube placement 10/30). Transition from Zosyn to PO Augmentin on discharge since he noted symptomatic improvement with Augmentin prior to admission. Failure to completely resolve his symptoms is more related to lack of source control for empyema than antibiotic failure. He has no history of resistant organisms or Pseudomonas to warrant more broad coverage. Additionally, would avoid fluoroquinolone for him with plan for long course and history of myalgias/tendon pain. We will arrange ID clinic follow up and plan for repeat CT chest wo contrast on 11/22 (prior to ID clinic visit). He needs further workup of RUL pulmonary mass with plan for outpatient IP follow up. 16S PCR pending on pleural fluid to help guide antimicrobials and can follow as outpatient.    Thank you for this consult. ID will sign off.  Patient was discussed with Dr. Lezama. Recommendations discussed with primary team.    Simin Earl PA-C  Infectious Diseases  Contact via [employer] or [employer] Paging/Directory    45 MINUTES SPENT BY ME on the date of service doing chart review, history, exam, documentation & further activities per the note.             Interval History:     Chivo feels well today. Symptoms improved, no chest pain or dyspnea. Labs improving - chronic leukocytosis downtrending. Plan for chest tube removal today per pulm. Reviewed CT findings - decrease size of " "effusion, though still loculated.         History of Present Illness:     Jose Luis Parker is a 74 year old male with past medical history significant for chronic myeloproliferative disorder, LLE DVT, bilateral PE (2017) who was admitted 10/28/24 with loculated right-sided pleural effusion. ID consulted for \"transition to oral antibiotics\".     Reported fever and productive green cough as outpatient. CXR with opacity at right lung base and probable right lower lung infiltrate. S/p 10 day course of Augmentin beginning 10/18 during which he noted improvement in fever (remaining low grade temps 99s only) and decreased cough. However, still had fatigue and right sided chest pain. Denies night sweats. Has had poor appetite in the last month, lost ~10 lbs per wife. Mild orthopnea at home, now resolved. Had outpatient CT chest 10/28 with \" low dense irregular tubular opacity in the right upper lobe that is of concern for a primary lung malignancy, a large subpulmonic, complex bilobed effusion in the right lung base with adjacent inflammatory stranding of the right paramediastinal fat, trace pericardial effusion dependently suspicious for an empyema, as well as an adjacent right middle and lower lobes and trace loculated effusion posterolaterally in the right chest \" Came to ED for drainage and further management. Was started on empiric IV vancomycin and Zosyn in ED, narrowed to Zosyn alone on 10/29 in setting of negative MRSA nares. Ultimately had chest tube placed 10/30 - notable for renzo pus, WBC 105K and 100% neutrophils, LDH >2500. Cytology negative for malignancy. Protein 5.6, glucose <2. Pleural fluid culture remains NGTD. He received lytics on 11/2 with plan for 6 doses and repeat CT chest 11/4 - pending. Had 100 ml output yesterday, 375 ml day prior. Consider thoracic surgery consult if no complete drainage. Blood culture on admission NGTD. He denies any fevers since admission, appetite is improving and right " sided chest pain is improving. He was never hypoxic and denies any dyspnea.     Exposures - no tobacco, vaping, smoking history. 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 He does garden a lot - used spray herbicide (ex. RoundUp) and digging in a lot of dirt. They have ~5 acres and large garden, manage [employer]. He was an avid runner and very active. Wife at bedside. They winter near [place], [place]. No international travel. Had a small dog who they lost last week. No family history of lung disease or cancer.    Endorses poorly tolerating levofloxacin - body and muscle aches, unable to walk due to tendon pain. No other antibiotic intolerance or allergies reported.          Review of Systems:    ROS: 10 point ROS neg other than the symptoms noted above in the HPI.         Current Medications:     Current Facility-Administered Medications   Medication Dose Route Frequency Provider Last Rate Last Admin    apixaban ANTICOAGULANT (ELIQUIS) tablet 2.5 mg  2.5 mg Oral BID Phil Johnson MD   2.5 mg at 11/05/24 0927    hydroxyurea (HYDREA) capsule 500 mg  500 mg Oral BID Urbano Patricia MD   500 mg at 11/05/24 0927    multivitamin w/minerals (THERA-VIT-M) tablet 1 tablet  1 tablet Oral Daily Morena Juares PA-C   1 tablet at 11/05/24 0927    piperacillin-tazobactam (ZOSYN) 3.375 g vial to attach to  mL bag  3.375 g Intravenous Q6H [place], Morena, PA-C   3.375 g at 11/05/24 0927    sodium chloride (PF) 0.9% PF flush 3 mL  3 mL Intracatheter Q8H Marcus Iniguez PA-C   3 mL at 11/05/24 0930    sodium chloride (PF) 0.9% PF flush 3 mL  3 mL Intracatheter Q8H FlatmoAndi danielsMorena, PA-C   3 mL at 11/04/24 1610            Allergies:     Allergies   Allergen Reactions    Levofloxacin Muscle Pain (Myalgia)            Physical Exam:   Vitals were reviewed  Patient Vitals for the past 24 hrs:   BP Temp Temp src Pulse Resp SpO2   11/05/24 0754 138/82 98  F (36.7  C) Oral 68 16 100 %    11/05/24 0500 -- -- -- 69 -- 97 %   11/04/24 2159 135/83 98.1  F (36.7  C) Oral 73 16 98 %   11/04/24 2102 138/73 98.2  F (36.8  C) Oral 73 16 99 %   11/04/24 1600 135/75 -- -- -- -- 98 %   11/04/24 1559 (!) 148/72 98.5  F (36.9  C) Oral 75 16 97 %       Physical Examination:  Constitutional: Pleasant adult male seen sitting up in bed, in NAD. Alert and interactive.   HEENT: NCAT, anicteric sclerae, conjunctiva clear. Moist mucous membranes. Dentition intact/well cared for.  Respiratory: Non-labored breathing, good air exchange. Lungs are clear to auscultation apically, diminished at R > L base, without wheezing, crackles or rhonchi. No cough noted.   Cardiovascular: Regular rate and rhythm with no murmur, rub or gallop.  GI: Normoactive BS. Abdomen is soft, nontender to palpation, non-distended. No rigidity or guarding. No rebound tenderness.  Skin: Warm and dry. No rashes or lesions on exposed surfaces.  Musculoskeletal: Extremities grossly normal. No edema present. Spine without tenderness to palpation.  Neurologic: A &O x3, speech normal, answering questions appropriately. Moves all extremities spontaneously. Grossly non-focal.  Neuropsychiatric: Mentation and affect normal/appropriate.  VAD: PIV is c/d/i with no erythema, drainage, or tenderness. Chest tube with serosanguinous output         Laboratory Data:     Inflammatory Markers  No lab results found.    Hematology Studies    Recent Labs   Lab Test 11/05/24  0614 11/04/24  0658 11/03/24  1628 11/01/24  1025 10/31/24  0910 10/30/24  1115 11/06/22  1553 04/11/22  1358 08/06/21  1117 08/06/21  1117 10/19/20  1305 10/21/19  1235 05/21/19  1239 01/21/19  0000 11/13/18  1228 09/02/18  1409   WBC 21.9* 25.3* 27.1* 28.9* 31.1* 32.4*   < > 25.0*   < > 17.7* 18.2*   < > 11.8*   < > 17.5* 13.0*   ANEU  --   --   --   --   --   --   --  23.0*  --  14.9* 15.8*  --  9.9*  --  14.6* 10.4*   AEOS  --   --   --   --   --   --   --  0.3  --  0.4 0.1  --  0.1  --  0.1 0.2  "  HGB 10.9* 10.2* 11.2* 11.0* 11.0* 10.6*   < > 13.7   < > 14.8 13.4   < > 14.6   < > 14.7 16.8   * 123* 123* 120* 121* 120*   < > 119*   < > 114* 122*   < > 112*   < > 114* 107*    343 406 418 414 425   < > 405   < > 293 353   < > 276   < > 250 233    < > = values in this interval not displayed.       Metabolic Studies     Recent Labs   Lab Test 11/05/24  0614 11/04/24  0658 11/03/24  1628 11/01/24  1025 10/31/24  0910    137 138 137 137   POTASSIUM 4.0 4.1 3.9 4.2 4.4   CHLORIDE 107 107 105 105 106   CO2 22 21* 22 22 22   BUN 19.1 18.3 21.2 18.6 19.3   CR 1.22* 1.35* 1.33* 1.23* 1.30*   GFRESTIMATED 62 55* 56* 61 57*       Hepatic Studies    Recent Labs   Lab Test 11/05/24  0614 11/04/24  0658 11/03/24  1628 10/31/24  0910 10/30/24  1115 10/29/24  0519 07/29/24  1321 04/28/24  1450   BILITOTAL 0.5 0.7 0.7  --   --  0.5 0.9 0.6   ALKPHOS 116 121 140  --   --  168* 89 108   ALBUMIN 2.7* 2.7* 3.0* 2.7* 2.6* 2.6* 4.0 3.7   AST 12 12 14  --   --  11 18 25   ALT <5 <5 7  --   --  15 11 7       Microbiology:  Culture   Date Value Ref Range Status   10/30/2024 No Growth  Final   10/29/2024   Final    >10 Squamous epithelial cells/low power field indicates oral contamination. Please recollect.   10/28/2024 No Growth  Final       Urine Studies  No lab results found.    Vancomycin Levels  No lab results found.    Invalid input(s): \"VANCO\"    Hepatitis B Testing No lab results found.  Hepatitis C Testing   No results found for: \"HCVAB\", \"HQTG\", \"HCGENO\", \"HCPCR\", \"HQTRNA\", \"HEPRNA\"  Respiratory Virus Testing    No results found for: \"RS\", \"FLUAG\"    IMAGING  XR Chest Port 1 View  Result Date: 11/5/2024  RESIDENT PRELIMINARY INTERPRETATION Impression: Stable appearance of right loculated pleural effusion with right basilar chest tube. Right basilar atelectasis is unchanged.    CT Chest w/o Contrast  Result Date: 11/4/2024  IMPRESSION: 1.  Decreased size of loculated right empyema. Stable right pleural " effusion and right basilar chest tube. 2.  Stable soft tissue mass in the right upper lobe measuring up to 4 cm, concerning for malignancy.     XR Chest Port 1 View  Result Date: 11/3/2024  IMPRESSION: 1. Stable loculated right pleural effusion with unchanged right greater than left patchy airspace opacities. 2. Grossly stable position of right basilar chest tube with possible kinking along the lateral chest wall. Uncoiled appearance of the central pigtail component, likely differences in projection.     CT Chest w/o Contrast  Result Date: 11/1/2024  IMPRESSION: 1. Decreasing volume of loculated right subpulmonic loculated empyema, with stable position of right basilar chest tube. Stable appearance of right basilar pleural effusion. 2. Unchanged appearance of right upper lobe soft tissue mass measuring up to 4 cm in transverse orientation. Concerning for malignancy. 3. Layering debris in the thoracic trachea. Consider clinical evaluation for aspiration risk.     XR Chest Port 1 View  Result Date: 10/31/2024  Impression: 1. Decreased volume of right basilar loculated pleural effusion post pigtail catheter placement compared with exam dated 10/27/2024. No appreciable pneumothorax. 2. Continued right greater than left interstitial and patchy airspace opacities, favored to represent atelectatic change versus very mild pulmonary edema versus infection.    Echo Complete  Result Date: 10/29/2024  Interpretation Summary Left ventricular size, wall motion and function are normal. The ejection fraction is 60-65%. Right ventricular function, chamber size, wall motion, and thickness are normal. No significant valvular abnormalities present. No pericardial effusion is present.  Previous study not available for comparison.

## 2024-11-05 NOTE — PROGRESS NOTES
"[facility] Pulmonology Follow up    Jose Luis Parker  MRN: [id number]  : 1950    Date of Admission: 10/28/2024  Date of Service: 2024    Assessment:  # RLL Community acquired pneumonia  # R Empyema  Symptoms of PNA started 10/15, started on Augmentin 10/18 and completed a 10d course but symptoms did not improve. Outpatient CT imaging 10/28 revealed a complex pleural effusion so pt was sent to the hospital. He was started on Vanc/Zosyn, narrowed to Zosyn. Chest tube draining renzo pus, WBC count >100,000, LDH >2500. Cultures negative to date. Is now s/p 6 doses of intrapleural lytics, final dose 24. Repeat CT chest on  with dramatic improvement in size of empyema. Microbiology studies remain negative (though had received antibiotics previously). Chest tube removed 24.    # RUL 3 cm pulmonary mass:   Will need work up, follow up with interventional pulmonary in the outpatient setting.    Recommendations:  Chest tube removed today  Agree with repeat CT chest prior to ID/Pulmonary follow-up  General pulmonary referral on discharge  Interventional pulmonary referral on discharge  Agree with antibiotic plan for augmentin BID for 4-6 weeks.  Okay with discharge from pulmonary standpoint.    Pulmonology will sign off. Please Vocera with any questions    Patient seen with staff Dr. Gonzalez Greenfield MD  Pulmonary Fellow    Subjective:    Continues to feel well. No difficulty walking around the unit today. Hoping to leave the hospital today.    Review of systems: focused ROS as above     Objective:    /82   Pulse 68   Temp 98  F (36.7  C) (Oral)   Resp 16   Ht 1.778 m (5' 10\")   Wt 65.1 kg (143 lb 9.6 oz)   SpO2 100%   BMI 20.60 kg/m      Gen: in no acute distress, sitting upright in the chair  HEENT: MMM  Pulm: Chest tube dressing C/D/I  GI: not distended  MSK: no gross deformities, no joint swelling  Integ: no rashes or lesions appreciated  Neuro: " speech clear, alert and oriented  Psych: calm, appropriate    Data:  I have personally reviewed micro data to date.

## 2024-11-05 NOTE — PLAN OF CARE
"[id number]:    Vital signs:  Temp: 98.1  F (36.7  C) Temp src: Oral BP: 135/83 Pulse: 73   Resp: 16 SpO2: 98 % O2 Device: None (Room air)   Height: 177.8 cm (5' 10\") Weight: 65.1 kg (143 lb 9.6 oz)      Problem: Gas Exchange Impaired  Goal: Optimal Gas Exchange  Intervention: Optimize Oxygenation and Ventilation  Recent Flowsheet Documentation  Taken 11/4/2024 2100 by Liberty Mendez, RN  Head of Bed (HOB) Positioning: HOB at 30-45 degrees  progressing     Problem: Adult Inpatient Plan of Care  Goal: Optimal Comfort and Wellbeing  11/5/2024 0526 by Liberty Mendez, RN  Outcome: Progressing  11/5/2024 0456 by Liberty Mendez, RN  Outcome: Progressing    Activity: Up to bathroom independently.   Neuros: A & O x4. Neuro intact.   Cardiac: WDL. Asymptomatic.   Respiratory: LS diminished in bases. O2 sats high 90s on RA. Denies SOB. Unlabored. Right CT to -20 suction with 50cc serous drainage, no air leak.   GI/: BS+, passing flatus, had BM yesterday. Voiding, not saving.   Diet: Regular diet.  Lines: Left PIV saline locked. Zosyn infused per orders.  Labs: Reviewed.   Pain/nausea: Denies pain. Denies nausea.   New changes this shift: None.   Plan: Continue POC.     "

## 2024-11-05 NOTE — DISCHARGE SUMMARY
[facility]  Hospitalist Discharge Summary      Date of Admission:  10/28/2024  Date of Discharge:  11/5/2024  Discharging Provider: Miguel Angel Avila PA-C  Discharge Service: Hospitalist Service, GOLD TEAM 4    Discharge Diagnoses   # Right lower lobe pneumonia  # Multilobe loculated right side pleural effusion, empyema  # Lung mass with concern for primary lung malignancy  # Hx of myeloproliferative disorder  # Hx of LLE and BL PE (2017)     Follow-ups Needed After Discharge   Follow-up Appointments       Adult [facility] Follow-up and recommended labs and tests      1) Follow up with primary care provider, Jean Pierre Peng, within 7 days to evaluate medication change, for hospital follow- up, and regarding new diagnosis.  The following labs/tests are recommended: CBC, BMP.  2) CT scan on 11/22   3) Infectious Disease on 11/25  4) Pulmonology for follow up on empyema - referral placed   5) Interventional pulmonology for lung nodule biopsy - referral placed     Appointments on [place] and/or [facility] (with [facility] or [facility] provider or service). Call [phone] if you haven't heard regarding these appointments within 7 days of discharge.                Unresulted Labs Ordered in the Past 30 Days of this Admission       Date and Time Order Name Status Description    11/4/2024  1:49 PM Bacterial DNA 16S Ribosomal Non Blood with Reflex to NGS 16S with Reflex to Enterobacterales Identification In process     10/28/2024  4:29 PM Acid-Fast Bacilli Culture and Stain In process         These results will be followed up by infectious disease and hospitalist pool    Discharge Disposition   Discharged to home  Condition at discharge: Stable    Hospital Course   Jose Luis Parker is a 74 year old male with PMH of chronic myeloproliferative disorder, LLE DVT, and bilateral PE (2017) was admitted 10/28/24 with loculated right-sided pleural effusion.  He was admitted for further  care management as follows:       # Right lower lobe pneumonia  # Multilobe loculated right side pleural effusion, empyema  Pt had 2 weeks of fever and green mucoid cough. Seen in , prescribed 10 days of Augmentin with marginal improvement. Returned to  10/27 where CXR showed persistent indeterminant opacity at the medial right lung base. Instructed to follow up 10/28 for chest CT which showed multiple pathologic findin) a tubular opacity in the RUL c/f primary lung malignancy 2) complex, bi-lobed effusion in the RLL, with inflammatory stranding and trace pericardial effusion and 3) trace loculated effusion posterolaterally in the RML and RLL.  Patient thus referred to [facility] ER.  On arrival, WBC up to 30K, CRP 68.5.  Started on broad-spectrum antibiotics with Vanco and Zosyn.  Pulmonology and infectious disease were consulted.  Patient was started on lytic therapy with intrapleural alteplase/dornase BID with 1hr instillation time x6 doses, completed .  Repeat CT chest on  showed decreased size of right empyema.  Chest tube was removed on .  Follow-up CXR did not show any evidence of PTX.  Patient has remained on room air.  Blood and sputum cultures with no growth today.  - Discharge to home today  - Rx extended course of Augmentin 875-125 mg twice daily for 4 to 6 weeks (4 weeks prescribed on discharge)    Follow ups:  1) Follow-up with infectious disease on  before discontinuing antibiotics  2) Repeat CT chest prior to ID follow-up, ordered for   3) Follow-up with pulmonology regarding empyema -referral placed  4)  Follow-up with interventional pulmonology to biopsy lung lesion as below -referral placed  5) Follow-up with PCP within the next 1 week for lab recheck    # Lung mass with concern for primary lung malignancy  Noted RUL 4 cm mass during course of work up. Cytology from pleural fluid was negative for malignancy thus far.  Pulmonology recommended outpatient interventional  pulmonology eval for biopsy.  - Placed interventional pulmonology referral for lung nodule biopsy as above       # Hx of myeloproliferative disorder: Continue PTA hydroxyurea 500 mg BID and apixiban 2.5 mg BID  # Hx of LLE and BL PE (2017): Held PTA apixaban for chest tube placement, now resumed  # Macrocytic anemia: Hypersegmented neutrophils present on 10/28 peripheral smear        Consultations This Hospital Stay   INTERVENTIONAL PULMONARY ADULT IP CONSULT  INTERVENTIONAL RADIOLOGY ADULT/PEDS IP CONSULT  PHARMACY TO DOSE VANCO  PULMONARY GENERAL ADULT IP CONSULT  THORACIC SURGERY ADULT IP CONSULT  NURSING TO CONSULT FOR VASCULAR ACCESS CARE IP CONSULT  INFECTIOUS DISEASE GENERAL ADULT IP CONSULT    Code Status   Full Code    Time Spent on this Encounter   I, Miguel Angel Avila PA-C, personally saw the patient today and spent greater than 30 minutes discharging this patient.       Miguel Angel Avila PA-C  [facility] [address] [address]  Phone: [phone]  ______________________________________________________________________    Physical Exam   Vital Signs: Temp: 98  F (36.7  C) Temp src: Oral BP: 138/82 Pulse: 68   Resp: 16 SpO2: 100 % O2 Device: None (Room air)    Weight: 143 lbs 9.6 oz   Constitutional: awake, alert, in no acute distress. A&Ox3  Eyes: EOM, PERRLA. Sclera clear, conjunctiva normal. Anicteric   ENT: Normocephalic, without obvious abnormality, atraumatic.   Respiratory: No increased work of breathing. R sided chest tube in place this AM   Musculoskeletal:  Full range of motion noted.    Neurologic: Cranial nerves II-XII are grossly intact.   Neuropsychiatric: General: normal, calm and normal eye contact. Normal affect        Primary Care Physician   Jean Pierre Peng    Discharge Orders      CT Chest w/o contrast     Primary Care - Care Coordination Referral      Adult Pulmonary Medicine  Referral      Adult Pulmonary Medicine  Referral       Reason for your hospital stay    You are admitted due to findings of an empyema, which is an infected fluid collection, around your right lung.  This was treated with a chest tube and antibiotics.  We also found an approximately 3 to 4 cm nodule in your right upper lung that needs further evaluation.  We will discharge you with an extended antibiotic course and follow-up referrals to infectious disease, pulmonology, and interventional pulmonology who will perform biopsy.     Activity    Your activity upon discharge: activity as tolerated     Adult [facility] Follow-up and recommended labs and tests    1) Follow up with primary care provider, Jean Pierre Peng, within 7 days to evaluate medication change, for hospital follow- up, and regarding new diagnosis.  The following labs/tests are recommended: CBC, BMP.  2) CT scan on 11/22   3) Infectious Disease on 11/25  4) Pulmonology for follow up on empyema - referral placed   5) Interventional pulmonology for lung nodule biopsy - referral placed     Appointments on [place] and/or [facility] (with [facility] or [facility] provider or service). Call [phone] if you haven't heard regarding these appointments within 7 days of discharge.     Diet    Regular Diet Adult       Significant Results and Procedures   Most Recent 3 CBC's:  Recent Labs   Lab Test 11/05/24  0614 11/04/24  0658 11/03/24  1628   WBC 21.9* 25.3* 27.1*   HGB 10.9* 10.2* 11.2*   * 123* 123*    343 406     Most Recent 3 BMP's:  Recent Labs   Lab Test 11/05/24  0614 11/04/24  0658 11/03/24  1628    137 138   POTASSIUM 4.0 4.1 3.9   CHLORIDE 107 107 105   CO2 22 21* 22   BUN 19.1 18.3 21.2   CR 1.22* 1.35* 1.33*   ANIONGAP 9 9 11   STEPAN 8.2* 8.2* 8.4*   GLC 85 86 94     Most Recent 3 INR's:  Recent Labs   Lab Test 10/28/24  1707   INR 1.47*   ,   Results for orders placed or performed during the hospital encounter of 10/28/24   IR Chest Tube Place Non Tunneled Right    Narrative    Procedure  date: 10/30/2024    History: The patient has a history of a complex right pleural  effusion. Chest tube placement was requested.    Preprocedure diagnosis: Right pleural effusion.    Post procedure diagnosis: Same.    Informed consent was obtained and the site confirmed.    : Anirudh Iniguez PA-C.    Assist: SHARONA Gomez.    Medications: 1% lidocaine, 4 cc subcutaneously.    Sedation face to face time: None.    Nursing: The patient was continuously monitored by IR nursing staff  under my supervision, and remained stable throughout the procedure.    Consent: The procedure, as well as risks and benefits was discussed  with the patient. Informed written and verbal consent was obtained  prior to the procedure.    Findings: Ultrasound was used to evaluate the right chest. An effusion  was seen with the patient in the upright position. The chest was then  prepped and draped in usual sterile fashion. Under ultrasound  guidance, the tissues overlying the effusion were locally anesthetized  with subcutaneous administration of 1% lidocaine, and a 3 mm skin  incision was made with a # 11 blade. Under ultrasound guidance, a 5  Fr., 10 cm Yueh needle/catheter combo was advanced into the right  pleural space, with return of opaque light green fluid. Upon return of  fluid, the Yueh catheter was advanced off the needle. Under ultrasound  guidance, a 0.35 Bentson wire was advanced through the catheter into  the pleural space. The UA catheter was exchanged over wire for a 12  [place] Mc dilator and the subcutaneous tissues were dilated over  wire. Under ultrasound guidance, the 12 [place] Mc dilator was  exchanged over wire for a 14 Fr. non-locking pigtail chest tube, which  was advanced into the pleural space. Subsequent ultrasound evaluation  confirmed proper placement within the pleural space. The catheter was  secured to the skin with a single 2-0 Ethilon suture,  and connected  to water-seal drainage apparatus.  90 cc aspirated and sent for labs as  ordered. A three-way stopcock was placed in the drain circuit for  lytic delivery if indicated. The site was cleansed and dressed with a  sterile bandage. Images were saved throughout the procedure. The  procedure was well-tolerated, with no immediate complications.     Contrast: None.    Fluoroscopy time: None.    Specimens: 90 cc opaque light green fluid from the right pleural  space.    Estimated blood loss: 0.5 cc.      Impression    Impression: Ultrasound guided placement of right-sided 14 Fr.  non-locking pigtail chest tube with stopcock in drainage circuit.  Return of opaque light green fluid.    Plan: Patient transported to the inpatient care unit in stable  condition. Dressing changes per floor routine. Follow up per primary  team.    Attestation: The physician assistant (PA) who performed this procedure  and signed the above report is licensed to practice in the [facility] pursuant to [place] Statute 147A.09.  This includes meeting the  Statute and [place] Board of Medical [employer] requirement of an  active Delegation Agreement, which documents delegation of services by  primary and alternate supervising physicians. All services rendered  are performed under a collaborative agreement with Dr. Valentino Meier, Director of Interventional Radiology, [facility].    DEJON QUIROGA PA-C         SYSTEM ID:  [id number]   XR Chest Port 1 View    Narrative    Exam: XR CHEST PORT 1 VIEW, 10/31/2024 10:56 AM    Comparison: Correlation made with CT chest, 10/28/2024; chest  radiograph, 10/27/2024    History: eval effusion/empyema s/p chest tube    Findings:  Frontal upright radiograph of the chest was obtained. Slightly  decreased volume of right side pleural effusion compared with  radiograph dated 10/27/2024 status post right basilar pigtail catheter  placement. Left costophrenic angle is clear. No pneumothorax. Midline  trachea. Stable cardiac  silhouette. Right greater than left patchy  interstitial opacities, similar to prior. Known nodular opacity in the  right upper lobe is better seen on comparison CT. No definite airspace  consolidation. No pneumothorax.      Impression    Impression:   1. Decreased volume of right basilar loculated pleural effusion post  pigtail catheter placement compared with exam dated 10/27/2024. No  appreciable pneumothorax.  2. Continued right greater than left interstitial and patchy airspace  opacities, favored to represent atelectatic change versus very mild  pulmonary edema versus infection.    I have personally reviewed the examination and initial interpretation  and I agree with the findings.    DEEPA ROBBINS DO         SYSTEM ID:  [id number]   CT Chest w/o Contrast    Narrative    EXAMINATION: Chest CT  11/1/2024 11:05 AM    CLINICAL HISTORY: follow up empyema    COMPARISON: CT PE, 10/28/2024    TECHNIQUE: CT imaging obtained through the chest without contrast.  Axial, coronal, and sagittal reconstructions and axial MIP reformatted  images are reviewed.     FINDINGS:  Lungs: Layering debris in the thoracic trachea. The central  tracheobronchial tree is otherwise patent. Decreased volume of right  subpulmonic loculated empyema with right basilar pigtail catheter in  unchanged position, correlated with chest radiograph dated 10/31/2024.  Similar appearance of right basilar pleural effusion. No left pleural  effusion. No pneumothorax. Unchanged soft tissue density mass within  the right upper lobe, which measures up to 4 cm in transverse  orientation (series 3 images 96 through 121).    Mediastinum: The thyroid is unremarkable. Cardiac size is normal.  Normal caliber aorta and main pulmonary artery. No pericardial  effusion. Mild-to-moderate coronary artery calcifications. No thoracic  lymphadenopathy. Esophagus is normal in caliber.    Bones and soft tissues: Stable T8 compression deformity. No  significant neuroforaminal  or spinal canal stenosis is suspected. No  suspicious osseous lesion is identified. Unremarkable appearance of  the superficial soft tissues.     Upper Abdomen: Simple hepatic cyst in the peripheral right hepatic  lobe. Hepatomegaly with hypoattenuation of the hepatic parenchyma the  visualized abdomen is otherwise unremarkable.      Impression    IMPRESSION:   1. Decreasing volume of loculated right subpulmonic loculated empyema,  with stable position of right basilar chest tube. Stable appearance of  right basilar pleural effusion.  2. Unchanged appearance of right upper lobe soft tissue mass measuring  up to 4 cm in transverse orientation. Concerning for malignancy.  3. Layering debris in the thoracic trachea. Consider clinical  evaluation for aspiration risk.    I have personally reviewed the examination and initial interpretation  and I agree with the findings.    MAREN WATTERS MD         SYSTEM ID:  [id number]   XR Chest Port 1 View    Narrative    EXAM: XR CHEST PORT 1 VIEW 11/3/2024 8:00 PM    INDICATION: chest tube not flushing; confirm location placement    COMPARISON: CT 11/1/2024, chest radiograph 10/31/2024    TECHNIQUE: Single portable semiupright AP view of the chest.     FINDINGS:   Stable loculated right pleural effusion with possible  repositioning/uncoiling of the pigtail catheter versus difference in  projection. Possible kink at the skin entrance of the catheter.  Similar right greater than left nodular patchy opacities. No evidence  of focal consolidation or pneumothorax. Trachea is midline. Cardiac  silhouette is stable. Soft tissues are unremarkable.      Impression    IMPRESSION:   1. Stable loculated right pleural effusion with unchanged right  greater than left patchy airspace opacities.  2. Grossly stable position of right basilar chest tube with possible  kinking along the lateral chest wall. Uncoiled appearance of the  central pigtail component, likely differences in projection.    I  have personally reviewed the examination and initial interpretation  and I agree with the findings.    SAKSHI LAU MD         SYSTEM ID:  [id number]   CT Chest w/o Contrast    Narrative    EXAMINATION: Chest CT  11/4/2024 1:47 PM    CLINICAL HISTORY: Follow up empyema    COMPARISON: CT chest 11/1/2024    TECHNIQUE: CT imaging obtained through the chest contrast. Axial,  coronal, and sagittal reconstructions and axial MIP reformatted images  are reviewed.     FINDINGS:    Vessels: Normal caliber of the thoracic aorta. Atherosclerotic  calcifications of the aorta, great vessels, and coronary arteries.    Lower neck and axillae: No nodule in the included portion of the  thyroid. No axillary lymphadenopathy.    Mediastinum and dixie: Normal heart size.  No pericardial effusion. No  mediastinal or hilar lymphadenopathy.    Airways: The central tracheobronchial tree is clear.    Lungs and pleura: Biapical scarring. Grossly stable appearance of  band-like soft tissue mass(series 8, image 43) measuring 4 cm in the  transverse orientation. Stable adjacent groundglass and nodular  opacities with fissure thickening. Unchanged right basilar chest tube.  Right basilar bronchiectasis. Stable small right pleural effusion. No  pneumothorax. No left pleural effusion.    Upper abdomen: No acute process in the included upper abdomen. Stable  hypodense focus in the right lobe of the liver, likely benign hepatic  cyst.     Bones and soft tissues: No acute or aggressive osseous abnormality.  Stable T8 compression deformity. Multilevel degenerative changes of  the visualized spine.      Impression    IMPRESSION:  1.  Decreased size of loculated right empyema. Stable right pleural  effusion and right basilar chest tube.  2.  Stable soft tissue mass in the right upper lobe measuring up to 4  cm, concerning for malignancy.    I have personally reviewed the examination and initial interpretation  and I agree with the findings.    MICHELLE  MD MIRTA         SYSTEM ID:  [id number]   XR Chest Port 1 View    Narrative    Exam: XR CHEST PORT 1 VIEW, 2024 12:07 PM    Comparison: Chest radiograph 11/3/2024    History: Follow-up empyema    Findings:  PA and lateral views of the chest. Trachea is approximately midline.  Stable cardiac silhouette. Grossly unchanged appearance of right  loculated pleural effusion with right basilar chest tube. Persistent  right basilar streaky opacities favoring atelectasis. Right upper lung  nodule better seen on CT. No new focal consolidation or appreciable  pneumothorax.       Impression    Impression: Stable appearance of right loculated pleural effusion with  right basilar chest tube. Right basilar atelectasis is unchanged.    I have personally reviewed the examination and initial interpretation  and I agree with the findings.    MAREN WATTERS MD         SYSTEM ID:  [id number]   Echo Complete     Value    LVEF  60-65%    Narrative    [id number]  [id number]  [id number]  [id number]^BRADY^FRANCISCO J^TOMÁS     [facility],[place]  Echocardiography Laboratory  [address] [place] [id number]     Name: JOHNATHAN TORRES  MRN: [id number]  : 1950  Study Date: 10/29/2024 02:05 PM  Age: 74 yrs  Gender: Male  Patient Location: [facility]  Reason For Study: Pericardial Effusion  Ordering Physician: FRANCISCO J ABREU  Performed By: Amanda Skinner     BSA: 1.8 m2  Height: 70 in  Weight: 143 lb  HR: 92  BP: 134/85 mmHg  ______________________________________________________________________________  Procedure  Complete Portable Echo Adult.  ______________________________________________________________________________  Interpretation Summary  Left ventricular size, wall motion and function are normal. The ejection  fraction is 60-65%.  Right ventricular function, chamber size, wall motion, and thickness are  normal.  No significant valvular abnormalities present.  No pericardial effusion is present.      Previous study not available for comparison.  ______________________________________________________________________________  Left Ventricle  Left ventricular size, wall motion and function are normal. The ejection  fraction is 60-65%. Left ventricular wall thickness is normal. Left  ventricular diastolic function is normal.     Right Ventricle  Right ventricular function, chamber size, wall motion, and thickness are  normal.     Atria  Both atria appear normal.     Mitral Valve  The mitral valve is normal. Trace to mild mitral insufficiency is present.     Aortic Valve  Aortic valve is normal in structure and function. The aortic valve is  tricuspid.     Tricuspid Valve  The tricuspid valve is normal. Trace tricuspid insufficiency is present. The  right ventricular systolic pressure is approximated at 23.1 mmHg plus the  right atrial pressure.     Pulmonic Valve  The pulmonic valve is normal.     Vessels  The aorta root is normal. The inferior vena cava was normal in size with  preserved respiratory variability. The thoracic aorta is normal.     Pericardium  No pericardial effusion is present.     Miscellaneous  No significant valvular abnormalities present.     Compared to Previous Study  Previous study not available for comparison.  ______________________________________________________________________________  MMode/2D Measurements & Calculations  IVSd: 1.0 cm  LVIDd: 5.3 cm  LVIDs: 3.0 cm  LVPWd: 1.0 cm  FS: 43.0 %  LV mass(C)d: 207.6 grams  LV mass(C)dI: 114.7 grams/m2  Ao root diam: 3.7 cm  asc Aorta Diam: 3.6 cm  LVOT diam: 2.4 cm  LVOT area: 4.4 cm2  Ao root diam index Ht(cm/m): 2.1  Ao root diam index BSA (cm/m2): 2.1  Asc Ao diam index BSA (cm/m2): 2.0  Asc Ao diam index Ht(cm/m): 2.0  RWT: 0.39  TAPSE: 2.8 cm     Doppler Measurements & Calculations  MV E max katelynn: 53.9 cm/sec  MV A max katelynn: 59.7 cm/sec  MV E/A: 0.90  MV dec time: 0.24 sec  TR max katelynn: 240.3 cm/sec  TR max P.1 mmHg  E/E' avg:  5.4  Lateral E/e': 5.2  Medial E/e': 5.7  RV S Bryant: 21.1 cm/sec     ______________________________________________________________________________  Report approved by: Dena CHAO 10/29/2024 03:49 PM             Discharge Medications   Current Discharge Medication List        START taking these medications    Details   amoxicillin-clavulanate (AUGMENTIN) 875-125 MG tablet Take 1 tablet by mouth 2 times daily.  Qty: 60 tablet, Refills: 0    Associated Diagnoses: Empyema (H)           CONTINUE these medications which have NOT CHANGED    Details   apixaban ANTICOAGULANT (ELIQUIS) 2.5 MG tablet Take 2.5 mg by mouth 2 times daily      hydroxyurea (HYDREA) 500 MG capsule Take 1 capsule (500 mg) by mouth 2 times daily  Qty: 180 capsule, Refills: 1    Associated Diagnoses: Chronic myeloproliferative disease (H)      Multiple Vitamin (MULTIVITAMINS PO) Take 1 tablet by mouth daily           STOP taking these medications       benzonatate (TESSALON) 100 MG capsule Comments:   Reason for Stopping:             Allergies   Allergies   Allergen Reactions    Levofloxacin Muscle Pain (Myalgia)

## 2024-11-06 ENCOUNTER — PATIENT OUTREACH (OUTPATIENT)
Dept: ONCOLOGY | Facility: CLINIC | Age: 74
End: 2024-11-06
Payer: COMMERCIAL

## 2024-11-06 ENCOUNTER — TELEPHONE (OUTPATIENT)
Dept: PULMONOLOGY | Facility: CLINIC | Age: 74
End: 2024-11-06
Payer: COMMERCIAL

## 2024-11-06 ENCOUNTER — PATIENT OUTREACH (OUTPATIENT)
Dept: CARE COORDINATION | Facility: CLINIC | Age: 74
End: 2024-11-06
Payer: COMMERCIAL

## 2024-11-06 DIAGNOSIS — J86.9 EMPYEMA (H): Primary | ICD-10-CM

## 2024-11-06 NOTE — PROGRESS NOTES
Clinic Care Coordination Contact  [facility]/Voicemail    Clinical Data: Care Coordinator Outreach    Outreach Documentation Number of Outreach Attempt   11/6/2024  10:39 AM 1       Left message on patient's voicemail with call back information and requested return call.      Plan: Care Coordinator will try to reach patient again in 1-2 business days.    Rhonda Hernandez RN, BSN, PHN Care Coordinator  [place], [place], and Karen Perdomo   Phone: [phone]

## 2024-11-06 NOTE — PROGRESS NOTES
New IP (Interventional Pulmonology) referral rec'd.  Chart reviewed.       New Patient: Interventional Pulmonary (Lung nodule) Nurse Navigator Note    Referring provider: Miguel Angel Avila PA-CUu [address]    Referred to (specialty): Interventional Pulmonary (Lung nodule)    Requested provider (if applicable): n/a    Date Referral Received: 11/5/2024    Evaluation for :  Lung nodule--3-4 cm RUL nodule needs biopsy    Clinical History (per Nurse review of records provided):    **BOOK MARKED**    EXAMINATION: Chest CT  11/4/2024 1:47 PM     CLINICAL HISTORY: Follow up empyema     COMPARISON: CT chest 11/1/2024     TECHNIQUE: CT imaging obtained through the chest contrast. Axial,  coronal, and sagittal reconstructions and axial MIP reformatted images  are reviewed.      FINDINGS:     Vessels: Normal caliber of the thoracic aorta. Atherosclerotic  calcifications of the aorta, great vessels, and coronary arteries.     Lower neck and axillae: No nodule in the included portion of the  thyroid. No axillary lymphadenopathy.     Mediastinum and dixie: Normal heart size.  No pericardial effusion. No  mediastinal or hilar lymphadenopathy.     Airways: The central tracheobronchial tree is clear.     Lungs and pleura: Biapical scarring. Grossly stable appearance of  band-like soft tissue mass(series 8, image 43) measuring 4 cm in the  transverse orientation. Stable adjacent groundglass and nodular  opacities with fissure thickening. Unchanged right basilar chest tube.  Right basilar bronchiectasis. Stable small right pleural effusion. No  pneumothorax. No left pleural effusion.     Upper abdomen: No acute process in the included upper abdomen. Stable  hypodense focus in the right lobe of the liver, likely benign hepatic  cyst.      Bones and soft tissues: No acute or aggressive osseous abnormality.  Stable T8 compression deformity. Multilevel degenerative changes of  the visualized  spine.                                                                      IMPRESSION:  1.  Decreased size of loculated right empyema. Stable right pleural  effusion and right basilar chest tube.  2.  Stable soft tissue mass in the right upper lobe measuring up to 4  cm, concerning for malignancy.     I have personally reviewed the examination and initial interpretation  and I agree with the findings.     MICHELLE KIRBY MD     Records Location: [facility]     Records Needed: none    Additional testing needed prior to consult: CT Chest and PFT's

## 2024-11-06 NOTE — LETTER
[facility] COORDIN[facility]  [address]  [place] [address]    November 7, 2024    Jose Luis Parker  [address]  [place] [address]      Dear Jose Luis,    I have been attempting to reach you since our last contact. I would like to continue to work with you and provide any additional support you may need on achieving your health care related goals. I would appreciate if you would give me a call at [phone] to let me know if you would like to continue working together. I know that there are many things that can affect our ability to communicate and I hope we can continue to work together.    We are focused on providing you with the highest-quality healthcare experience possible.    Sincerely,    Rhonda Hernandez RN, BSN, PHN Care Coordinator  [facility]   Phone: [phone]

## 2024-11-06 NOTE — TELEPHONE ENCOUNTER
Left Voicemail (1st Attempt) for the patient to call back and schedule the following:    Appointment type: [facility] follow-up  Provider: ANY GEN PULM  Return date: NEXT AVAILABLE  Specialty phone number: [phone]  Additional appointment(s) needed: FPFT  Additonal Notes: N/A

## 2024-11-07 NOTE — PROGRESS NOTES
Clinic Care Coordination Contact  [facility]/Voicemail    Clinical Data: Care Coordinator Outreach    Outreach Documentation Number of Outreach Attempt   11/6/2024  10:39 AM 1   11/7/2024  12:14 PM 2       Left message on patient's voicemail with call back information and requested return call.      Plan: Care Coordinator will send care coordination introduction letter with care coordinator contact information and explanation of care coordination services via [facility]. Care Coordinator will do no further outreaches at this time.    Rhonda Hernandez RN, BSN, PHN Care Coordinator  [facility]   Phone: [phone]

## 2024-11-08 ENCOUNTER — OFFICE VISIT (OUTPATIENT)
Dept: FAMILY MEDICINE | Facility: CLINIC | Age: 74
End: 2024-11-08
Payer: COMMERCIAL

## 2024-11-08 VITALS
WEIGHT: 145 LBS | SYSTOLIC BLOOD PRESSURE: 146 MMHG | HEART RATE: 75 BPM | OXYGEN SATURATION: 99 % | RESPIRATION RATE: 16 BRPM | TEMPERATURE: 97 F | HEIGHT: 70 IN | DIASTOLIC BLOOD PRESSURE: 80 MMHG | BODY MASS INDEX: 20.76 KG/M2

## 2024-11-08 DIAGNOSIS — J86.9 EMPYEMA (H): ICD-10-CM

## 2024-11-08 DIAGNOSIS — Z86.711 HISTORY OF PULMONARY EMBOLISM: ICD-10-CM

## 2024-11-08 DIAGNOSIS — D47.1 CHRONIC MYELOPROLIFERATIVE DISORDER (H): ICD-10-CM

## 2024-11-08 DIAGNOSIS — J18.9 PNEUMONIA OF RIGHT LOWER LOBE DUE TO INFECTIOUS ORGANISM: Primary | ICD-10-CM

## 2024-11-08 DIAGNOSIS — N18.31 CHRONIC KIDNEY DISEASE, STAGE 3A (H): ICD-10-CM

## 2024-11-08 DIAGNOSIS — R91.1 PULMONARY NODULE: ICD-10-CM

## 2024-11-08 LAB
ANION GAP SERPL CALCULATED.3IONS-SCNC: 10 MMOL/L (ref 7–15)
BASOPHILS # BLD AUTO: 0.4 10E3/UL (ref 0–0.2)
BASOPHILS NFR BLD AUTO: 1 %
BUN SERPL-MCNC: 22.5 MG/DL (ref 8–23)
CALCIUM SERPL-MCNC: 8.5 MG/DL (ref 8.8–10.4)
CHLORIDE SERPL-SCNC: 108 MMOL/L (ref 98–107)
CREAT SERPL-MCNC: 1.05 MG/DL (ref 0.67–1.17)
EGFRCR SERPLBLD CKD-EPI 2021: 74 ML/MIN/1.73M2
EOSINOPHIL # BLD AUTO: 0.3 10E3/UL (ref 0–0.7)
EOSINOPHIL NFR BLD AUTO: 1 %
ERYTHROCYTE [DISTWIDTH] IN BLOOD BY AUTOMATED COUNT: 14.8 % (ref 10–15)
GLUCOSE SERPL-MCNC: 88 MG/DL (ref 70–99)
HCO3 SERPL-SCNC: 21 MMOL/L (ref 22–29)
HCT VFR BLD AUTO: 34.8 % (ref 40–53)
HGB BLD-MCNC: 11.8 G/DL (ref 13.3–17.7)
IMM GRANULOCYTES # BLD: 0.3 10E3/UL
IMM GRANULOCYTES NFR BLD: 1 %
LYMPHOCYTES # BLD AUTO: 2.2 10E3/UL (ref 0.8–5.3)
LYMPHOCYTES NFR BLD AUTO: 8 %
MCH RBC QN AUTO: 40 PG (ref 26.5–33)
MCHC RBC AUTO-ENTMCNC: 33.9 G/DL (ref 31.5–36.5)
MCV RBC AUTO: 118 FL (ref 78–100)
MONOCYTES # BLD AUTO: 0.6 10E3/UL (ref 0–1.3)
MONOCYTES NFR BLD AUTO: 2 %
NEUTROPHILS # BLD AUTO: 24.8 10E3/UL (ref 1.6–8.3)
NEUTROPHILS NFR BLD AUTO: 87 %
NEUTS HYPERSEG BLD QL SMEAR: PRESENT
NRBC # BLD AUTO: 0 10E3/UL
NRBC BLD AUTO-RTO: 0 /100
PLAT MORPH BLD: ABNORMAL
PLATELET # BLD AUTO: 414 10E3/UL (ref 150–450)
POTASSIUM SERPL-SCNC: 4.8 MMOL/L (ref 3.4–5.3)
RBC # BLD AUTO: 2.95 10E6/UL (ref 4.4–5.9)
RBC MORPH BLD: ABNORMAL
SCANNED LAB RESULT: ABNORMAL
SODIUM SERPL-SCNC: 139 MMOL/L (ref 135–145)
WBC # BLD AUTO: 28.6 10E3/UL (ref 4–11)

## 2024-11-08 PROCEDURE — 80048 BASIC METABOLIC PNL TOTAL CA: CPT | Performed by: INTERNAL MEDICINE

## 2024-11-08 PROCEDURE — 99213 OFFICE O/P EST LOW 20 MIN: CPT | Performed by: INTERNAL MEDICINE

## 2024-11-08 PROCEDURE — 85025 COMPLETE CBC W/AUTO DIFF WBC: CPT | Performed by: INTERNAL MEDICINE

## 2024-11-08 PROCEDURE — 36415 COLL VENOUS BLD VENIPUNCTURE: CPT | Performed by: INTERNAL MEDICINE

## 2024-11-08 ASSESSMENT — PAIN SCALES - GENERAL: PAINLEVEL_OUTOF10: NO PAIN (0)

## 2024-11-08 NOTE — TELEPHONE ENCOUNTER
Left Voicemail (2nd Attempt) for the patient to call back and schedule the following:    Appointment type: RPULM  Provider: ANY GEN PULM  Return date: NEXT AVAILABLE  Specialty phone number: [phone]  Additional appointment(s) needed: FPFT  Additonal Notes: N/A

## 2024-11-08 NOTE — PROGRESS NOTES
I called and left a detailed vm for Jose Luis.  I explained my role and purpose of my call.  I asked that he call our NPS (new patient scheduling) team at [phone] to get scheduled for an IP (Interventional Pulmonology) consult.

## 2024-11-11 ENCOUNTER — MYC MEDICAL ADVICE (OUTPATIENT)
Dept: INTERNAL MEDICINE | Facility: CLINIC | Age: 74
End: 2024-11-11
Payer: COMMERCIAL

## 2024-11-11 NOTE — TELEPHONE ENCOUNTER
Jose Luis,  Your blood counts are stable with an elevated white blood cell count consistent with your myeloproliferative disorder. Your kidney function is normal. You should Follow up as discussed at your hospital Follow up visit    Jorge Luis MD on 11/11/2024 at 11:14 AM

## 2024-11-21 DIAGNOSIS — D47.1 CHRONIC MYELOPROLIFERATIVE DISEASE (H): ICD-10-CM

## 2024-11-21 RX ORDER — HYDROXYUREA 500 MG/1
500 CAPSULE ORAL 2 TIMES DAILY
Qty: 180 CAPSULE | Refills: 1 | Status: SHIPPED | OUTPATIENT
Start: 2024-11-21

## 2024-12-25 LAB
ACID FAST STAIN (ARUP): NORMAL

## 2025-02-20 ENCOUNTER — PATIENT OUTREACH (OUTPATIENT)
Dept: FAMILY MEDICINE | Facility: CLINIC | Age: 75
End: 2025-02-20
Payer: COMMERCIAL

## 2025-02-20 NOTE — TELEPHONE ENCOUNTER
Patient Quality Outreach    Patient is due for the following:   Physical Annual Wellness Visit    Action(s) Taken:   Schedule a Annual Wellness Visit    Type of outreach:    Sent [facility] message.    Questions for provider review:    None           Mirna Ford CMA

## 2025-04-19 ENCOUNTER — LAB (OUTPATIENT)
Dept: LAB | Facility: CLINIC | Age: 75
End: 2025-04-19
Payer: COMMERCIAL

## 2025-04-19 DIAGNOSIS — D47.1 CHRONIC MYELOPROLIFERATIVE DISEASE (H): ICD-10-CM

## 2025-04-19 DIAGNOSIS — Z11.59 NEED FOR HEPATITIS C SCREENING TEST: ICD-10-CM

## 2025-04-19 DIAGNOSIS — D47.1 CHRONIC MYELOPROLIFERATIVE DISORDER (H): ICD-10-CM

## 2025-04-19 DIAGNOSIS — N18.31 CHRONIC KIDNEY DISEASE, STAGE 3A (H): ICD-10-CM

## 2025-04-19 DIAGNOSIS — Z86.718 PERSONAL HISTORY OF VENOUS THROMBOSIS AND EMBOLISM: ICD-10-CM

## 2025-04-19 DIAGNOSIS — Z79.01 CURRENT USE OF LONG TERM ANTICOAGULATION: ICD-10-CM

## 2025-04-19 DIAGNOSIS — Z13.6 SCREENING FOR CARDIOVASCULAR CONDITION: Primary | ICD-10-CM

## 2025-04-19 LAB
BASOPHILS # BLD AUTO: 0.3 10E3/UL (ref 0–0.2)
BASOPHILS NFR BLD AUTO: 1 %
EOSINOPHIL # BLD AUTO: 0.2 10E3/UL (ref 0–0.7)
EOSINOPHIL NFR BLD AUTO: 1 %
ERYTHROCYTE [DISTWIDTH] IN BLOOD BY AUTOMATED COUNT: 17.9 % (ref 10–15)
HCT VFR BLD AUTO: 43.4 % (ref 40–53)
HGB BLD-MCNC: 14.5 G/DL (ref 13.3–17.7)
HOLD SPECIMEN: NORMAL
IMM GRANULOCYTES # BLD: 0.3 10E3/UL
IMM GRANULOCYTES NFR BLD: 1 %
LYMPHOCYTES # BLD AUTO: 1.8 10E3/UL (ref 0.8–5.3)
LYMPHOCYTES NFR BLD AUTO: 8 %
MCH RBC QN AUTO: 38.6 PG (ref 26.5–33)
MCHC RBC AUTO-ENTMCNC: 33.4 G/DL (ref 31.5–36.5)
MCV RBC AUTO: 115 FL (ref 78–100)
MONOCYTES # BLD AUTO: 0.5 10E3/UL (ref 0–1.3)
MONOCYTES NFR BLD AUTO: 2 %
NEUTROPHILS # BLD AUTO: 20.6 10E3/UL (ref 1.6–8.3)
NEUTROPHILS NFR BLD AUTO: 87 %
NRBC # BLD AUTO: 0.1 10E3/UL
NRBC BLD AUTO-RTO: 0 /100
PLATELET # BLD AUTO: 252 10E3/UL (ref 150–450)
RBC # BLD AUTO: 3.76 10E6/UL (ref 4.4–5.9)
WBC # BLD AUTO: 23.6 10E3/UL (ref 4–11)

## 2025-04-19 PROCEDURE — 36415 COLL VENOUS BLD VENIPUNCTURE: CPT

## 2025-04-19 PROCEDURE — 80053 COMPREHEN METABOLIC PANEL: CPT

## 2025-04-19 PROCEDURE — 85025 COMPLETE CBC W/AUTO DIFF WBC: CPT

## 2025-04-21 ENCOUNTER — OFFICE VISIT (OUTPATIENT)
Dept: HEMATOLOGY | Facility: CLINIC | Age: 75
End: 2025-04-21
Attending: INTERNAL MEDICINE
Payer: COMMERCIAL

## 2025-04-21 VITALS
HEART RATE: 67 BPM | WEIGHT: 164.4 LBS | BODY MASS INDEX: 23.59 KG/M2 | OXYGEN SATURATION: 98 % | TEMPERATURE: 97.4 F | SYSTOLIC BLOOD PRESSURE: 156 MMHG | DIASTOLIC BLOOD PRESSURE: 89 MMHG

## 2025-04-21 DIAGNOSIS — D47.1 CHRONIC MYELOPROLIFERATIVE DISORDER (H): Primary | ICD-10-CM

## 2025-04-21 DIAGNOSIS — Z86.718 PERSONAL HISTORY OF VENOUS THROMBOSIS AND EMBOLISM: ICD-10-CM

## 2025-04-21 DIAGNOSIS — Z79.01 CURRENT USE OF LONG TERM ANTICOAGULATION: ICD-10-CM

## 2025-04-21 LAB
ALBUMIN SERPL BCG-MCNC: 4.4 G/DL (ref 3.5–5.2)
ALP SERPL-CCNC: 82 U/L (ref 40–150)
ALT SERPL W P-5'-P-CCNC: 11 U/L (ref 0–70)
ANION GAP SERPL CALCULATED.3IONS-SCNC: 12 MMOL/L (ref 7–15)
AST SERPL W P-5'-P-CCNC: 20 U/L (ref 0–45)
BILIRUB SERPL-MCNC: 0.7 MG/DL
BUN SERPL-MCNC: 27.7 MG/DL (ref 8–23)
CALCIUM SERPL-MCNC: 8.6 MG/DL (ref 8.8–10.4)
CHLORIDE SERPL-SCNC: 104 MMOL/L (ref 98–107)
CREAT SERPL-MCNC: 1.58 MG/DL (ref 0.67–1.17)
EGFRCR SERPLBLD CKD-EPI 2021: 45 ML/MIN/1.73M2
GLUCOSE SERPL-MCNC: 101 MG/DL (ref 70–99)
HCO3 SERPL-SCNC: 21 MMOL/L (ref 22–29)
POTASSIUM SERPL-SCNC: 4.7 MMOL/L (ref 3.4–5.3)
PROT SERPL-MCNC: 7.8 G/DL (ref 6.4–8.3)
SODIUM SERPL-SCNC: 137 MMOL/L (ref 135–145)

## 2025-04-21 PROCEDURE — G0463 HOSPITAL OUTPT CLINIC VISIT: HCPCS | Performed by: INTERNAL MEDICINE

## 2025-04-22 ENCOUNTER — MYC MEDICAL ADVICE (OUTPATIENT)
Dept: HEMATOLOGY | Facility: CLINIC | Age: 75
End: 2025-04-22
Payer: COMMERCIAL

## 2025-04-22 DIAGNOSIS — R91.1 LUNG NODULE SEEN ON IMAGING STUDY: Primary | ICD-10-CM

## 2025-04-22 NOTE — PROGRESS NOTES
[facility]  [facility]  [address], [address], [place] [address]  Main: [phone], Fax: [phone]      Outpatient Clinic Visit  Date:  04/22/2025    Víctor Parker is a 74 year old man with a history of chronic myeloproliferative disorder (JAK2+) and left lower extremity DVT with bilateral PE in 2017, here today for routine follow-up.      Background history:  He presented in December 2017 with a left lower extremity deep vein thrombosis and bilateral pulmonary emboli.  These were initially felt to be provoked by prolonged car travel from [place] to [place].  However, soon thereafter, he was identified as having an underlying chronic myeloproliferative disorder (JAK2 +). Following diagnosis, he was placed on hydroxyurea and apixaban.      Interval History:   Víctor returns today for routine follow-up.  After our last visit in July 2024, he was hospitalized in late October 2024 with a right lower lobe pneumonia with associated empyema.  He feels that he has recovered from this episode, although it took longer than expected.  He currently feels well and continues to remain physically very active, exercising daily.  He also enjoys gardening.    He remains on hydroxyurea and apixaban which he continues to tolerate without any issues.  He has had no bleeding symptoms.    Medications:  Hydroxyurea 1000 mg daily  Apixaban 2.5 mg twice daily   Daily multivitamin    Exam:  He appears to be in good health.  He is afebrile, blood pressure 156/89 (higher readings noted in clinic versus home readings is a longstanding finding), pulse 67 and regular, O2 saturation 98% on room air.  Lungs are clear.  RRR S1S2, no murmur.  Liver and spleen are not palpable.  No lower extremity edema.     Labs:  Most recent labs are from 4/19/2025.  Serum electrolytes are normal.  Creatinine 1.58 (this is toward the top end of his baseline) LFTs normal.  White count 23.6 with 87%  neutrophils, 8% lymphocytes, 2% monocytes, 1% eosinophils, 1% basophils.  Hemoglobin 14.5,  (due to hydroxyurea), platelets 252,000.      ASSESSMENT AND PLAN:   1.  Chronic myeloproliferative disorder (JAK2 positive).   2.  History of left lower extremity deep vein thrombosis and bilateral pulmonary emboli (December 2017).   3.  Right upper lobe pulmonary nodule (noted October 2024)     Overall, Víctor continues to do well on hydroxyurea and apixaban.  His CBC is stable.  He has had no bleeding issues and remains appropriate for long-term anticoagulation.    During his hospitalization in October 2024 for pneumonia and empyema, he was also noted to have a soft tissue mass in the right upper lobe measuring 4 cm, concerning for malignancy.  The plan had been for him to follow-up with interventional pulmonology for consideration of biopsy, but it appears that this was not done.  Unfortunately, this was not noted until after today's clinic visit, so a separate message was sent to Víctor via [employer] with this information.  We will encourage him to obtain a follow-up CT scan as soon as possible.    Will plan to see him back later this fall, prior to his return trip to [place] for the winter.      Total time on date of encounter 40 minutes, including review of medical records and labs, clinic visit, and documentation.      Kwan Esposito MD  Professor of Medicine  Division of Hematology, Oncology, and Transplantation  Director, [facility]

## 2025-04-22 NOTE — TELEPHONE ENCOUNTER
[id number]  Jose Luis Parker  74 year old male  [facility] Diagnosis: chronic myeloproliferative disorder and VTE  [facility] Provider: Cate    RN called patient and left voicemail letting him know that Dr. Esposito sent a [employer] message. Asked him to call me back to schedule recommended imaging.    Addendum 4/23/2025 3:46 PM:  RN tried calling patient again and left a voicemail. Will send mychart message as well.    Virgie Funes RN, BSN, PCCN  Nurse Clinician    [facility]  [address] [place] [address], [facility] [address], [place] [address]   Office, direct: [phone]  Main office number: [phone]  Pronouns: She, her, hers

## 2025-05-29 DIAGNOSIS — D47.1 CHRONIC MYELOPROLIFERATIVE DISEASE (H): ICD-10-CM

## 2025-05-29 RX ORDER — HYDROXYUREA 500 MG/1
500 CAPSULE ORAL 2 TIMES DAILY
Qty: 60 CAPSULE | Refills: 11 | Status: SHIPPED | OUTPATIENT
Start: 2025-05-29

## 2025-06-15 ENCOUNTER — HEALTH MAINTENANCE LETTER (OUTPATIENT)
Age: 75
End: 2025-06-15

## 2025-07-17 ENCOUNTER — PATIENT OUTREACH (OUTPATIENT)
Dept: FAMILY MEDICINE | Facility: CLINIC | Age: 75
End: 2025-07-17
Payer: COMMERCIAL

## 2025-07-17 NOTE — LETTER
Jose Luis Parker  [address]  [place] [address]     Hi Jose Luis,      You are receiving this letter as a reminder that you are due for your annual wellness  visit.      Please call [phone] to get this scheduled at your earliest convenience, or if you have any questions, you can call the same number.    Thank you,      [place] Care Team/AT      Electronically signed

## 2025-07-17 NOTE — TELEPHONE ENCOUNTER
LM to schedule awv, letter mailed.     Kimberley THAKUR,    [facility]

## 2025-07-17 NOTE — TELEPHONE ENCOUNTER
Patient Quality Outreach    Patient is due for the following:   Physical Annual Wellness Visit    Action(s) Taken:   Schedule a Annual Wellness Visit    Type of outreach:    Phone, left message for patient/parent to call back.    Questions for provider review:    None         Mirna Ford [facility]  Chart routed to Care Team.

## (undated) RX ORDER — LIDOCAINE HYDROCHLORIDE 10 MG/ML
INJECTION, SOLUTION EPIDURAL; INFILTRATION; INTRACAUDAL; PERINEURAL
Status: DISPENSED
Start: 2024-10-30